# Patient Record
Sex: FEMALE | Race: WHITE | Employment: OTHER | URBAN - METROPOLITAN AREA
[De-identification: names, ages, dates, MRNs, and addresses within clinical notes are randomized per-mention and may not be internally consistent; named-entity substitution may affect disease eponyms.]

---

## 2017-02-09 RX ORDER — LOSARTAN POTASSIUM 50 MG/1
TABLET ORAL
Qty: 90 TABLET | Refills: 0 | OUTPATIENT
Start: 2017-02-09

## 2017-02-09 NOTE — TELEPHONE ENCOUNTER
No future appointments.  Future Appointments  Date Time Provider Darren Ana   2/22/2017 10:30 AM Trudy Brower MD [facility] [address]         LOV 8/16    LAST LAB 8/16    LAST RX  MetFORMIN HCl 850 MG Oral Tab 90 tablet 1 8/8/2016     Losartan Pota

## 2017-02-17 ENCOUNTER — APPOINTMENT (OUTPATIENT)
Dept: LAB | Age: 72
End: 2017-02-17
Attending: FAMILY MEDICINE
Payer: MEDICARE

## 2017-02-17 DIAGNOSIS — E11.9 CONTROLLED TYPE 2 DIABETES MELLITUS WITHOUT COMPLICATION, WITHOUT LONG-TERM CURRENT USE OF INSULIN (HCC): ICD-10-CM

## 2017-02-17 DIAGNOSIS — I10 ESSENTIAL HYPERTENSION WITH GOAL BLOOD PRESSURE LESS THAN 140/90: ICD-10-CM

## 2017-02-17 DIAGNOSIS — E78.2 MIXED HYPERLIPIDEMIA: ICD-10-CM

## 2017-02-17 LAB
ALBUMIN SERPL-MCNC: 4.1 G/DL (ref 3.5–4.8)
ALP LIVER SERPL-CCNC: 59 U/L (ref 55–142)
ALT SERPL-CCNC: 58 U/L (ref 14–54)
AST SERPL-CCNC: 38 U/L (ref 15–41)
BILIRUB SERPL-MCNC: 1.2 MG/DL (ref 0.1–2)
BUN BLD-MCNC: 23 MG/DL (ref 8–20)
CALCIUM BLD-MCNC: 9.7 MG/DL (ref 8.3–10.3)
CHLORIDE: 103 MMOL/L (ref 101–111)
CHOLEST SMN-MCNC: 180 MG/DL (ref ?–200)
CO2: 27 MMOL/L (ref 22–32)
CREAT BLD-MCNC: 0.95 MG/DL (ref 0.55–1.02)
CREAT UR-SCNC: 146 MG/DL
EST. AVERAGE GLUCOSE BLD GHB EST-MCNC: 140 MG/DL (ref 68–126)
GLUCOSE BLD-MCNC: 127 MG/DL (ref 70–99)
HBA1C MFR BLD HPLC: 6.5 % (ref ?–5.7)
HDLC SERPL-MCNC: 73 MG/DL (ref 45–?)
HDLC SERPL: 2.47 {RATIO} (ref ?–4.44)
LDLC SERPL CALC-MCNC: 77 MG/DL (ref ?–130)
M PROTEIN MFR SERPL ELPH: 8.1 G/DL (ref 6.1–8.3)
MICROALBUMIN UR-MCNC: 1.22 MG/DL
MICROALBUMIN/CREAT 24H UR-RTO: 8.4 UG/MG (ref ?–30)
NONHDLC SERPL-MCNC: 107 MG/DL (ref ?–130)
POTASSIUM SERPL-SCNC: 4.9 MMOL/L (ref 3.6–5.1)
SODIUM SERPL-SCNC: 137 MMOL/L (ref 136–144)
TRIGLYCERIDES: 148 MG/DL (ref ?–150)
VLDL: 30 MG/DL (ref 5–40)

## 2017-02-17 PROCEDURE — 82043 UR ALBUMIN QUANTITATIVE: CPT

## 2017-02-17 PROCEDURE — 83036 HEMOGLOBIN GLYCOSYLATED A1C: CPT

## 2017-02-17 PROCEDURE — 36415 COLL VENOUS BLD VENIPUNCTURE: CPT

## 2017-02-17 PROCEDURE — 80061 LIPID PANEL: CPT

## 2017-02-17 PROCEDURE — 82570 ASSAY OF URINE CREATININE: CPT

## 2017-02-17 PROCEDURE — 80053 COMPREHEN METABOLIC PANEL: CPT

## 2017-02-22 ENCOUNTER — OFFICE VISIT (OUTPATIENT)
Dept: FAMILY MEDICINE CLINIC | Facility: CLINIC | Age: 72
End: 2017-02-22

## 2017-02-22 VITALS
SYSTOLIC BLOOD PRESSURE: 122 MMHG | HEART RATE: 75 BPM | TEMPERATURE: 98 F | DIASTOLIC BLOOD PRESSURE: 70 MMHG | RESPIRATION RATE: 16 BRPM | OXYGEN SATURATION: 98 % | HEIGHT: 62 IN | BODY MASS INDEX: 31.06 KG/M2 | WEIGHT: 168.81 LBS

## 2017-02-22 DIAGNOSIS — H81.11 BENIGN POSITIONAL VERTIGO, RIGHT: ICD-10-CM

## 2017-02-22 DIAGNOSIS — R79.89 LFT ELEVATION: ICD-10-CM

## 2017-02-22 DIAGNOSIS — E11.9 CONTROLLED TYPE 2 DIABETES MELLITUS WITHOUT COMPLICATION, WITHOUT LONG-TERM CURRENT USE OF INSULIN (HCC): Primary | ICD-10-CM

## 2017-02-22 DIAGNOSIS — I10 ESSENTIAL HYPERTENSION WITH GOAL BLOOD PRESSURE LESS THAN 140/90: ICD-10-CM

## 2017-02-22 DIAGNOSIS — Z79.84 LONG TERM CURRENT USE OF ORAL HYPOGLYCEMIC DRUG: ICD-10-CM

## 2017-02-22 PROCEDURE — 99214 OFFICE O/P EST MOD 30 MIN: CPT | Performed by: FAMILY MEDICINE

## 2017-02-22 RX ORDER — MECLIZINE HCL 12.5 MG/1
12.5 TABLET ORAL 3 TIMES DAILY PRN
Qty: 30 TABLET | Refills: 0 | Status: SHIPPED | OUTPATIENT
Start: 2017-02-22 | End: 2018-01-26

## 2017-02-22 RX ORDER — LANCETS
1 EACH MISCELLANEOUS DAILY
Qty: 100 EACH | Refills: 1 | Status: SHIPPED | OUTPATIENT
Start: 2017-02-22 | End: 2017-08-11

## 2017-02-22 RX ORDER — LOSARTAN POTASSIUM 50 MG/1
50 TABLET ORAL DAILY
Qty: 90 TABLET | Refills: 1 | Status: SHIPPED | OUTPATIENT
Start: 2017-02-22 | End: 2017-10-04

## 2017-02-22 NOTE — PROGRESS NOTES
Lara Steinberg IS A 73year old female [address] [facility] Patient presents with:  Lab Results: go over blood work from 2/17/17  Medication Request: metformin,losartan potassium, glucose strips and lancets  Dizziness: for last 2 weeks dizzness and upset stomach, pt strip Rfl: 1   Lancets (UNILET COMFORTOUCH LANCET) Does not apply Misc 1 each by In Vitro route daily. Disp: 100 each Rfl: 1   Losartan Potassium 50 MG Oral Tab Take 1 tablet (50 mg total) by mouth daily.  Disp: 90 tablet Rfl: 1   MetFORMIN HCl 850 MG Oral in high school for 1 year    Alcohol Use: Yes  0.0 oz/week    0 Standard drinks or equivalent per week         Comment: occ glass of wine 1-2 per month    Drug Use: No    Sexual Activity: Yes     Other Topics Concern    Caffeine Concern No    Exercise Yes 77(mg/dL)  Date: 02/17/2017   VLDL Value: 30(mg/dL)  Date: 02/17/2017   Chol/HDL Ratio Value: 2.47  Date: 02/17/2017   Non HDL Chol Value: 107(mg/dL)  Date: 02/17/2017   HgbA1C Value: 6.5(%)* Date: 02/17/2017   Estimated Average Glucose Value: 140(mg/dL)*

## 2017-02-22 NOTE — PATIENT INSTRUCTIONS
Schedule annual medicare advantage exam in near future. Do prevnar at that visit. You're up to date and well controlled with diabetic care. Congrats! Recheck on diabetes in 6 months.

## 2017-02-23 ENCOUNTER — TELEPHONE (OUTPATIENT)
Dept: FAMILY MEDICINE CLINIC | Facility: CLINIC | Age: 72
End: 2017-02-23

## 2017-02-23 DIAGNOSIS — E11.9 CONTROLLED TYPE 2 DIABETES MELLITUS WITHOUT COMPLICATION, WITHOUT LONG-TERM CURRENT USE OF INSULIN (HCC): Primary | ICD-10-CM

## 2017-03-30 ENCOUNTER — LAB ENCOUNTER (OUTPATIENT)
Dept: LAB | Age: 72
End: 2017-03-30
Attending: FAMILY MEDICINE
Payer: MEDICARE

## 2017-03-30 DIAGNOSIS — R79.89 LFT ELEVATION: ICD-10-CM

## 2017-03-30 PROCEDURE — 36415 COLL VENOUS BLD VENIPUNCTURE: CPT

## 2017-03-30 PROCEDURE — 80076 HEPATIC FUNCTION PANEL: CPT

## 2017-04-05 ENCOUNTER — OFFICE VISIT (OUTPATIENT)
Dept: FAMILY MEDICINE CLINIC | Facility: CLINIC | Age: 72
End: 2017-04-05

## 2017-04-05 VITALS
DIASTOLIC BLOOD PRESSURE: 64 MMHG | BODY MASS INDEX: 31.58 KG/M2 | HEART RATE: 84 BPM | RESPIRATION RATE: 16 BRPM | TEMPERATURE: 98 F | OXYGEN SATURATION: 96 % | HEIGHT: 62 IN | SYSTOLIC BLOOD PRESSURE: 120 MMHG | WEIGHT: 171.63 LBS

## 2017-04-05 DIAGNOSIS — E78.2 MIXED HYPERLIPIDEMIA: ICD-10-CM

## 2017-04-05 DIAGNOSIS — E11.9 TYPE 2 DIABETES MELLITUS WITHOUT COMPLICATION, WITHOUT LONG-TERM CURRENT USE OF INSULIN (HCC): ICD-10-CM

## 2017-04-05 DIAGNOSIS — Z12.31 ENCOUNTER FOR SCREENING MAMMOGRAM FOR BREAST CANCER: ICD-10-CM

## 2017-04-05 DIAGNOSIS — Z12.11 SCREENING FOR COLON CANCER: ICD-10-CM

## 2017-04-05 DIAGNOSIS — I10 HYPERTENSION, ESSENTIAL: ICD-10-CM

## 2017-04-05 DIAGNOSIS — Z78.0 POSTMENOPAUSAL: ICD-10-CM

## 2017-04-05 DIAGNOSIS — R79.89 LFT ELEVATION: ICD-10-CM

## 2017-04-05 DIAGNOSIS — E11.36 DIABETIC CATARACT(366.41): ICD-10-CM

## 2017-04-05 DIAGNOSIS — N63.10 BREAST MASS, RIGHT: ICD-10-CM

## 2017-04-05 DIAGNOSIS — Z23 NEED FOR VACCINATION: ICD-10-CM

## 2017-04-05 DIAGNOSIS — Z12.31 VISIT FOR SCREENING MAMMOGRAM: ICD-10-CM

## 2017-04-05 DIAGNOSIS — K76.0 FATTY LIVER: ICD-10-CM

## 2017-04-05 DIAGNOSIS — Z00.00 ENCOUNTER FOR ANNUAL HEALTH EXAMINATION: Primary | ICD-10-CM

## 2017-04-05 PROBLEM — E66.9 OBESITY (BMI 30.0-34.9): Status: ACTIVE | Noted: 2017-04-05

## 2017-04-05 PROBLEM — E66.811 OBESITY (BMI 30.0-34.9): Status: ACTIVE | Noted: 2017-04-05

## 2017-04-05 PROBLEM — H26.9 CATARACT OF BOTH EYES: Status: ACTIVE | Noted: 2017-04-05

## 2017-04-05 PROBLEM — R42 VERTIGO: Status: ACTIVE | Noted: 2017-04-05

## 2017-04-05 PROCEDURE — 90670 PCV13 VACCINE IM: CPT | Performed by: FAMILY MEDICINE

## 2017-04-05 PROCEDURE — 96160 PT-FOCUSED HLTH RISK ASSMT: CPT | Performed by: FAMILY MEDICINE

## 2017-04-05 PROCEDURE — G0438 PPPS, INITIAL VISIT: HCPCS | Performed by: FAMILY MEDICINE

## 2017-04-05 PROCEDURE — G0009 ADMIN PNEUMOCOCCAL VACCINE: HCPCS | Performed by: FAMILY MEDICINE

## 2017-04-05 RX ORDER — PRAVASTATIN SODIUM 40 MG
40 TABLET ORAL NIGHTLY
Qty: 90 TABLET | Refills: 1 | Status: SHIPPED | OUTPATIENT
Start: 2017-04-05 | End: 2017-10-04

## 2017-04-05 NOTE — PATIENT INSTRUCTIONS
Refilled pravastatin. Recheck liver enzymes in 3 months. Discussed weight loss. 8043-2689 calories a day recommended, portion control. Recheck diabetes in 6 months. Plan last pap in 6 months.  (not usually recommended past age 79, but a final betw (mg/dL)   Date Value   03/25/2014 137     TRIGLYCERIDES (mg/dL)   Date Value   02/17/2017 148   03/25/2014 95    Recheck 6 months    EKG - covered if needed at [facility], and non-screening if indicated for medical reasons Electrocardiogram date03 for 2 year anniversary or as ordered in After Visit Summary    Ordered   Pap and Pelvic      Pap: Every 3 yrs age 21-68 or Pap+HPV every 5 yrs age 33-67, Covered every 2 yrs up to age 79 or Yearly if High Risk   There are no preventive care reminders to [facility] Directives    [employer]no. org/publications/Documents/personal_dec. pdf  An information packet, including necessary form from the [employer] website. http://www. [place]ph[place]state. [place]/public/books/advin.htm  A link to the [place] with gritty weather proof paint. ? Pay attention to curbs and other changes in surfaces when out in the community. ? Take care when walking on gravel or grassy surfaces. ? Avoid walking on snowy or icy surfaces. ?  Use a cane or walker (indoors and out)

## 2017-04-05 NOTE — PROGRESS NOTES
HPI:   Jovanni Bourne is a 73year old female who presents for a MA (Medicare Advantage) Supervisit (Once per calendar year). Here for Supervisit.   Her last annual assessment has been over 1 year: Annual Physical due on 11/09/1947         Patient Car 1000 MG Oral Cap Take  by mouth daily. aspirin 81 MG Oral Tab Take 81 mg by mouth daily. Glucosamine-Chondroitin (GLUCOSAMINE CHONDR COMPLEX OR) Take  by mouth daily.       MEDICAL INFORMATION:   She  has a past medical history of Unspecified essential headaches  PSYCHE: denies depression or anxiety  HEMATOLOGIC: denies hx of anemia  ENDOCRINE: denies thyroid history, has DM, see prior visits.   ALL/ASTHMA: has hx of allergy to medicines, none environmental, no asthma    Tripped and fell 4 weeks ago, see normal, no rashes or lesions   Lymph nodes: Cervical, supraclavicular, and axillary nodes normal   Neurologic: Normal    and Breasts:  normal appearance, left normal without mass, nipple discharge or axillary adenopathy.  Right with fibrous band at 9:00 fro suspicious. Comments:  right behind and lateral to areola  Orders:  -     [facility] (CPT=77066); Future    Encounter for screening mammogram for breast cancer  -     [facility] DI[facility]NOST[facility] BILATERAL (CPT=77066);  Future    Cataract of both eyes, uns help    Preparing your meals: Able without help    Managing money/bills: Able without help    Taking medications as prescribed: Able without help    Are you able to afford your medications?: Yes    Hearing Problems?: No     Functional Status     Hearing Pr patient  PREVENTATIVE SERVICES  INDICATIONS AND SCHEDULE Internal Lab or Procedure External Lab or Procedure   Diabetes Screening      HbgA1C   Annually   Lab Results  Component Value Date   A1C 6.8* 03/25/2014   HGBA1C 6.5* 02/17/2017    No flowsheet data No vaccine history found Please get once after your 65th birthday    Pneumococcal 23 (Pneumovax)  Covered Once after 65 5/7/14 Please get once after your 65th birthday    Hepatitis B for Moderate/High Risk No vaccine history found Medium/high risk factors: Annually Spirometry date:  No flowsheet data found.          Template: SHRUTHI WASSERMAN MEDICARE ANNUAL ASSESSMENT FEMALE [81580]

## 2017-04-13 ENCOUNTER — HOSPITAL ENCOUNTER (OUTPATIENT)
Dept: BONE DENSITY | Age: 72
Discharge: HOME | End: 2017-04-13
Attending: FAMILY MEDICINE
Payer: MEDICARE

## 2017-04-13 ENCOUNTER — HOSPITAL ENCOUNTER (OUTPATIENT)
Dept: ULTRASOUND IMAGING | Age: 72
Discharge: HOME | End: 2017-04-13
Attending: FAMILY MEDICINE
Payer: MEDICARE

## 2017-04-13 ENCOUNTER — HOSPITAL ENCOUNTER (OUTPATIENT)
Dept: MAMMOGRAPHY | Age: 72
Discharge: HOME | End: 2017-04-13
Attending: FAMILY MEDICINE
Payer: MEDICARE

## 2017-04-13 ENCOUNTER — APPOINTMENT (OUTPATIENT)
Dept: ULTRASOUND IMAGING | Facility: HOSPITAL | Age: 72
End: 2017-04-13
Attending: FAMILY MEDICINE
Payer: MEDICARE

## 2017-04-13 ENCOUNTER — TELEPHONE (OUTPATIENT)
Dept: FAMILY MEDICINE CLINIC | Facility: CLINIC | Age: 72
End: 2017-04-13

## 2017-04-13 DIAGNOSIS — N63.10 BREAST MASS, RIGHT: ICD-10-CM

## 2017-04-13 DIAGNOSIS — Z78.0 POSTMENOPAUSAL: ICD-10-CM

## 2017-04-13 DIAGNOSIS — Z12.31 ENCOUNTER FOR SCREENING MAMMOGRAM FOR BREAST CANCER: ICD-10-CM

## 2017-04-13 DIAGNOSIS — Z12.31 VISIT FOR SCREENING MAMMOGRAM: ICD-10-CM

## 2017-04-13 PROCEDURE — 77066 DX MAMMO INCL CAD BI: CPT

## 2017-04-13 PROCEDURE — 77062 BREAST TOMOSYNTHESIS BI: CPT

## 2017-04-13 PROCEDURE — 76642 ULTRASOUND BREAST LIMITED: CPT

## 2017-04-13 PROCEDURE — 77080 DXA BONE DENSITY AXIAL: CPT

## 2017-04-13 NOTE — TELEPHONE ENCOUNTER
Per Dr Darnell Beltran patient needs R breast biopsy/ suspicious for cancer patient has palpable mass.

## 2017-04-13 NOTE — IMAGING NOTE
Assisted Dr. Marysol Billy with Recommendation for an Ultrasound guided breast biopsy. Procedure explained and written information given to Wu Oro. Emotional support provided and all questions answered.   Our breast center schedulers will call pt within

## 2017-04-14 DIAGNOSIS — N63.10 MASS OF RIGHT BREAST: Primary | ICD-10-CM

## 2017-04-18 ENCOUNTER — TELEPHONE (OUTPATIENT)
Dept: FAMILY MEDICINE CLINIC | Facility: CLINIC | Age: 72
End: 2017-04-18

## 2017-04-18 NOTE — TELEPHONE ENCOUNTER
Nehal Ackerman called and left a message stating she is having dental work on April 26th and needs an antibiotic called in due to knee replacement.

## 2017-04-19 NOTE — TELEPHONE ENCOUNTER
Actually, on review of antibiotics for dental procedures in patients with joint replacement, this is the current evidence:     In 2013, the ADA and AAOS published a joint guideline on the prevention of orthopedic implant infections in patients undergoing de

## 2017-04-20 ENCOUNTER — HOSPITAL ENCOUNTER (OUTPATIENT)
Dept: MAMMOGRAPHY | Facility: HOSPITAL | Age: 72
Discharge: HOME | End: 2017-04-20
Attending: FAMILY MEDICINE
Payer: MEDICARE

## 2017-04-20 DIAGNOSIS — N63.10 MASS OF RIGHT BREAST: ICD-10-CM

## 2017-04-20 PROCEDURE — 19083 BX BREAST 1ST LESION US IMAG: CPT

## 2017-04-20 PROCEDURE — 88305 TISSUE EXAM BY PATHOLOGIST: CPT | Performed by: FAMILY MEDICINE

## 2017-04-20 PROCEDURE — 88377 M/PHMTRC ALYS ISHQUANT/SEMIQ: CPT | Performed by: FAMILY MEDICINE

## 2017-04-20 PROCEDURE — 88360 TUMOR IMMUNOHISTOCHEM/MANUAL: CPT | Performed by: FAMILY MEDICINE

## 2017-04-20 PROCEDURE — 77065 DX MAMMO INCL CAD UNI: CPT

## 2017-04-24 NOTE — IMAGING NOTE
Pt has an apt with Radiologist and Breast Care Coordinator, RN tomorrow (4/25 @ 1:30) to discuss positive breast biopsy results.  I will then facilitate a surgical consult with Dr. King Rivas per PCP referral.

## 2017-04-25 ENCOUNTER — TELEPHONE (OUTPATIENT)
Dept: FAMILY MEDICINE CLINIC | Facility: CLINIC | Age: 72
End: 2017-04-25

## 2017-04-25 ENCOUNTER — NURSE ONLY (OUTPATIENT)
Dept: MAMMOGRAPHY | Facility: HOSPITAL | Age: 72
End: 2017-04-25

## 2017-04-25 NOTE — TELEPHONE ENCOUNTER
Patient has invasive breast cancer and cant see Dr Jose Og for 2 weeks. Do you want her to see another dr or ok to wait? Call Whitney Arvizu [phone] to inform.

## 2017-04-25 NOTE — IMAGING NOTE
Pt and  arrived for Breast Biopsy Result Clinic with Radiologist, Dr. Barbara Coleman, and myself, Breast Care Coordinator RN. New breast cancer diagnosis discussed. Invasive Ductal Carcinoma, ER+/AK+ with a low Ki. Emotional support provided.   \"ve

## 2017-04-25 NOTE — TELEPHONE ENCOUNTER
Prefer for her to see someone with [facility]. Dr. Trino Gonsales or Harry Sofia or [place].

## 2017-04-26 ENCOUNTER — TELEPHONE (OUTPATIENT)
Dept: FAMILY MEDICINE CLINIC | Facility: CLINIC | Age: 72
End: 2017-04-26

## 2017-04-26 DIAGNOSIS — N63.10 BREAST MASS, RIGHT: Primary | ICD-10-CM

## 2017-04-26 DIAGNOSIS — C50.911 MALIGNANT NEOPLASM OF RIGHT FEMALE BREAST, UNSPECIFIED SITE OF BREAST: ICD-10-CM

## 2017-04-26 NOTE — TELEPHONE ENCOUNTER
Dr Zane Dickens  referral.placed.     Future Appointments  Date Time Provider Darren Perez   5/1/2017 8:30 AM Negrito Lopez MD [facility]   5/2/2017 11:00 AM Dee Orozco MD [facility] [address]   5/9/2017 8:00 AM Calvin Henderson MD

## 2017-04-26 NOTE — TELEPHONE ENCOUNTER
Patient states she was diagnosed with breast cancer yesterday. She would like a second opinion. She needs a referral with her insurance. She would like to see Dr Yury Blanco with [facility].

## 2017-05-01 ENCOUNTER — OFFICE VISIT (OUTPATIENT)
Dept: SURGERY | Facility: CLINIC | Age: 72
End: 2017-05-01

## 2017-05-01 VITALS
RESPIRATION RATE: 16 BRPM | BODY MASS INDEX: 31.47 KG/M2 | WEIGHT: 171 LBS | SYSTOLIC BLOOD PRESSURE: 118 MMHG | HEART RATE: 86 BPM | HEIGHT: 62 IN | DIASTOLIC BLOOD PRESSURE: 76 MMHG

## 2017-05-01 DIAGNOSIS — C50.411 MALIGNANT NEOPLASM OF UPPER-OUTER QUADRANT OF RIGHT FEMALE BREAST (HCC): Primary | ICD-10-CM

## 2017-05-01 PROCEDURE — 99204 OFFICE O/P NEW MOD 45 MIN: CPT | Performed by: SURGERY

## 2017-05-01 RX ORDER — CHLORHEXIDINE GLUCONATE 0.12 MG/ML
RINSE ORAL
Refills: 0 | COMMUNITY
Start: 2017-04-27 | End: 2017-06-14

## 2017-05-01 NOTE — H&P
New Patient  Jenny Hawkins  11/9/1945 5/1/2017    This patient was referred by Ugo Tracey MD for evaluation and consultation for right breast cancer.     Chief Complaint:   Right breast cancer  Breast Pain: Some discomfort in the area of the recen x2   • Migraine, unspecified, without mention of intractable migraine without mention of status migrainosus    • Bell's palsy      on right   • Cholelithiases    • Other and unspecified hyperlipidemia    • High blood pressure    • High cholesterol    • Typ History Narrative    3 daughters, Roberto Carlos Murray 46, Fabiola 42, Corrie 40. , retired, has a cat.          Current outpatient prescriptions:   •  Chlorhexidine Gluconate 0.12 % Mouth/Throat Solution, USE UTD, Disp: , Rfl: 0  •  Pravastatin Sodium 40 MG Oral Tab, light and accommodate. Lungs: Clear to auscultation bilaterally. Breast: The patient is examined in the sitting and supine position. The breast parenchyma is moderately nodular.   Right Breast -ecchymosis in the lateral aspect of the breast status post the patient's satisfaction. No other questions were presented at this time. Assessment   Assessment:          The patient is a 80-year-old female with no family history of breast or ovarian cancer.   In fact there is no known cancer history in the fam She would likely prefer this option to mastectomy. She wishes to discuss surgery with her  and daughter prior to finalizing her decision. She will contact this office once she has made her decision.     Thank you for allowing me to participate in y

## 2017-05-03 ENCOUNTER — TELEPHONE (OUTPATIENT)
Dept: FAMILY MEDICINE CLINIC | Facility: CLINIC | Age: 72
End: 2017-05-03

## 2017-05-03 DIAGNOSIS — N63.10 BREAST MASS, RIGHT: Primary | ICD-10-CM

## 2017-05-03 NOTE — TELEPHONE ENCOUNTER
Pt had appt with Dr Lyric Bryant on 5/1/17  Now asking for second opinion with Dr Radhames Davis    Additional order placed with Dr Avalos Fitting  Plan:     Javi Morales is a candidate for breast conservation with lumpectomy with localization and sentinel lymph node biopsy.  S

## 2017-05-05 ENCOUNTER — TELEPHONE (OUTPATIENT)
Dept: SURGERY | Facility: CLINIC | Age: 72
End: 2017-05-05

## 2017-05-05 NOTE — TELEPHONE ENCOUNTER
Called pt to follow up. Tammie Nova does have some concern re the radioisotope which is used for sentinel LN mapping. This portion of procedure can be done with methylene blue only. All questions were answered.    Tammie Nova states she is reading books and gettin

## 2017-05-11 ENCOUNTER — TELEPHONE (OUTPATIENT)
Dept: FAMILY MEDICINE CLINIC | Facility: CLINIC | Age: 72
End: 2017-05-11

## 2017-05-11 RX ORDER — ACETAMINOPHEN 160 MG
2000 TABLET,DISINTEGRATING ORAL DAILY
COMMUNITY

## 2017-05-11 NOTE — TELEPHONE ENCOUNTER
Patient states she's having surgery on May 22nd and she wants to know when she needs to stop taking the Asprin?

## 2017-05-12 NOTE — TELEPHONE ENCOUNTER
Patient is to hold ASA for a week before surgery. Labs ordered and EKG already done. Spoke to patient and gave information.

## 2017-05-17 ENCOUNTER — APPOINTMENT (OUTPATIENT)
Dept: LAB | Age: 72
End: 2017-05-17
Payer: MEDICARE

## 2017-05-17 ENCOUNTER — LAB ENCOUNTER (OUTPATIENT)
Dept: LAB | Age: 72
End: 2017-05-17
Payer: MEDICARE

## 2017-05-17 DIAGNOSIS — Z85.3 HISTORY OF RIGHT BREAST CANCER: ICD-10-CM

## 2017-05-17 PROCEDURE — 93010 ELECTROCARDIOGRAM REPORT: CPT | Performed by: INTERNAL MEDICINE

## 2017-05-17 PROCEDURE — 36415 COLL VENOUS BLD VENIPUNCTURE: CPT

## 2017-05-17 PROCEDURE — 80048 BASIC METABOLIC PNL TOTAL CA: CPT

## 2017-05-17 PROCEDURE — 93005 ELECTROCARDIOGRAM TRACING: CPT

## 2017-05-18 ENCOUNTER — TELEPHONE (OUTPATIENT)
Dept: MAMMOGRAPHY | Age: 72
End: 2017-05-18

## 2017-05-18 NOTE — TELEPHONE ENCOUNTER
Spoke with pt and provided pre-procedure instructions for SLN mapping and needle LOC scheduled for Monday.  I explained that pt will travel through the lobby to the [facility] to have LOC preformed by the Radiologist. Pt verbalized understanding, answered question

## 2017-05-21 ENCOUNTER — ANESTHESIA EVENT (OUTPATIENT)
Dept: SURGERY | Facility: HOSPITAL | Age: 72
End: 2017-05-21
Payer: MEDICARE

## 2017-05-22 ENCOUNTER — SURGERY (OUTPATIENT)
Age: 72
End: 2017-05-22

## 2017-05-22 ENCOUNTER — HOSPITAL ENCOUNTER (OUTPATIENT)
Dept: MAMMOGRAPHY | Facility: HOSPITAL | Age: 72
Discharge: HOME | End: 2017-05-22
Attending: SURGERY
Payer: MEDICARE

## 2017-05-22 ENCOUNTER — HOSPITAL ENCOUNTER (OUTPATIENT)
Facility: HOSPITAL | Age: 72
Setting detail: HOSPITAL OUTPATIENT SURGERY
Discharge: HOME | End: 2017-05-22
Attending: SURGERY | Admitting: SURGERY
Payer: MEDICARE

## 2017-05-22 ENCOUNTER — HOSPITAL ENCOUNTER (OUTPATIENT)
Dept: NUCLEAR MEDICINE | Facility: HOSPITAL | Age: 72
Discharge: HOME | End: 2017-05-22
Attending: SURGERY
Payer: MEDICARE

## 2017-05-22 ENCOUNTER — APPOINTMENT (OUTPATIENT)
Dept: MAMMOGRAPHY | Facility: HOSPITAL | Age: 72
End: 2017-05-22
Attending: SURGERY
Payer: MEDICARE

## 2017-05-22 ENCOUNTER — ANESTHESIA (OUTPATIENT)
Dept: SURGERY | Facility: HOSPITAL | Age: 72
End: 2017-05-22
Payer: MEDICARE

## 2017-05-22 ENCOUNTER — HOSPITAL ENCOUNTER (OUTPATIENT)
Dept: NUCLEAR MEDICINE | Facility: HOSPITAL | Age: 72
End: 2017-05-22
Attending: SURGERY
Payer: MEDICARE

## 2017-05-22 VITALS
WEIGHT: 163 LBS | TEMPERATURE: 99 F | OXYGEN SATURATION: 94 % | HEART RATE: 90 BPM | BODY MASS INDEX: 30 KG/M2 | SYSTOLIC BLOOD PRESSURE: 119 MMHG | HEIGHT: 62 IN | RESPIRATION RATE: 16 BRPM | DIASTOLIC BLOOD PRESSURE: 58 MMHG

## 2017-05-22 DIAGNOSIS — C50.912 MALIGNANT NEOPLASM OF LEFT FEMALE BREAST, UNSPECIFIED ESTROGEN RECEPTOR STATUS, UNSPECIFIED SITE OF BREAST (HCC): ICD-10-CM

## 2017-05-22 DIAGNOSIS — Z85.3 HISTORY OF RIGHT BREAST CANCER: Primary | ICD-10-CM

## 2017-05-22 PROCEDURE — 88377 M/PHMTRC ALYS ISHQUANT/SEMIQ: CPT | Performed by: SURGERY

## 2017-05-22 PROCEDURE — 82962 GLUCOSE BLOOD TEST: CPT

## 2017-05-22 PROCEDURE — 88307 TISSUE EXAM BY PATHOLOGIST: CPT | Performed by: SURGERY

## 2017-05-22 PROCEDURE — 07B50ZX EXCISION OF RIGHT AXILLARY LYMPHATIC, OPEN APPROACH, DIAGNOSTIC: ICD-10-PCS | Performed by: SURGERY

## 2017-05-22 PROCEDURE — 0HBT0ZZ EXCISION OF RIGHT BREAST, OPEN APPROACH: ICD-10-PCS | Performed by: SURGERY

## 2017-05-22 PROCEDURE — 78195 LYMPH SYSTEM IMAGING: CPT | Performed by: SURGERY

## 2017-05-22 PROCEDURE — BH40ZZZ ULTRASONOGRAPHY OF RIGHT BREAST: ICD-10-PCS | Performed by: SURGERY

## 2017-05-22 PROCEDURE — BH00ZZZ PLAIN RADIOGRAPHY OF RIGHT BREAST: ICD-10-PCS | Performed by: SURGERY

## 2017-05-22 PROCEDURE — 77065 DX MAMMO INCL CAD UNI: CPT | Performed by: SURGERY

## 2017-05-22 PROCEDURE — 19285 PERQ DEV BREAST 1ST US IMAG: CPT | Performed by: SURGERY

## 2017-05-22 RX ORDER — LIDOCAINE AND PRILOCAINE 25; 25 MG/G; MG/G
CREAM TOPICAL
Status: DISCONTINUED
Start: 2017-05-22 | End: 2017-05-22

## 2017-05-22 RX ORDER — METOCLOPRAMIDE HYDROCHLORIDE 5 MG/ML
10 INJECTION INTRAMUSCULAR; INTRAVENOUS AS NEEDED
Status: DISCONTINUED | OUTPATIENT
Start: 2017-05-22 | End: 2017-05-22

## 2017-05-22 RX ORDER — BACITRACIN 50000 [USP'U]/1
INJECTION, POWDER, LYOPHILIZED, FOR SOLUTION INTRAMUSCULAR AS NEEDED
Status: DISCONTINUED | OUTPATIENT
Start: 2017-05-22 | End: 2017-05-22

## 2017-05-22 RX ORDER — SODIUM CHLORIDE, SODIUM LACTATE, POTASSIUM CHLORIDE, CALCIUM CHLORIDE 600; 310; 30; 20 MG/100ML; MG/100ML; MG/100ML; MG/100ML
INJECTION, SOLUTION INTRAVENOUS CONTINUOUS
Status: DISCONTINUED | OUTPATIENT
Start: 2017-05-22 | End: 2017-05-22

## 2017-05-22 RX ORDER — CLINDAMYCIN PHOSPHATE 900 MG/50ML
900 INJECTION INTRAVENOUS ONCE
Status: COMPLETED | OUTPATIENT
Start: 2017-05-22 | End: 2017-05-22

## 2017-05-22 RX ORDER — ONDANSETRON 2 MG/ML
4 INJECTION INTRAMUSCULAR; INTRAVENOUS AS NEEDED
Status: DISCONTINUED | OUTPATIENT
Start: 2017-05-22 | End: 2017-05-22

## 2017-05-22 RX ORDER — INSULIN ASPART 100 [IU]/ML
INJECTION, SOLUTION INTRAVENOUS; SUBCUTANEOUS ONCE
Status: DISCONTINUED | OUTPATIENT
Start: 2017-05-22 | End: 2017-05-22

## 2017-05-22 RX ORDER — LIDOCAINE HYDROCHLORIDE AND EPINEPHRINE 10; 10 MG/ML; UG/ML
INJECTION, SOLUTION INFILTRATION; PERINEURAL AS NEEDED
Status: DISCONTINUED | OUTPATIENT
Start: 2017-05-22 | End: 2017-05-22

## 2017-05-22 RX ORDER — DEXTROSE MONOHYDRATE 25 G/50ML
50 INJECTION, SOLUTION INTRAVENOUS
Status: DISCONTINUED | OUTPATIENT
Start: 2017-05-22 | End: 2017-05-22 | Stop reason: HOSPADM

## 2017-05-22 RX ORDER — DIAZEPAM 5 MG/1
TABLET ORAL
Status: DISCONTINUED
Start: 2017-05-22 | End: 2017-05-22

## 2017-05-22 RX ORDER — MEPERIDINE HYDROCHLORIDE 25 MG/ML
12.5 INJECTION INTRAMUSCULAR; INTRAVENOUS; SUBCUTANEOUS AS NEEDED
Status: DISCONTINUED | OUTPATIENT
Start: 2017-05-22 | End: 2017-05-22

## 2017-05-22 RX ORDER — TRAMADOL HYDROCHLORIDE 50 MG/1
100 TABLET ORAL EVERY 6 HOURS PRN
Status: DISCONTINUED | OUTPATIENT
Start: 2017-05-22 | End: 2017-05-22

## 2017-05-22 RX ORDER — TRAMADOL HYDROCHLORIDE 50 MG/1
50 TABLET ORAL EVERY 6 HOURS PRN
Qty: 30 TABLET | Refills: 1 | Status: SHIPPED | OUTPATIENT
Start: 2017-05-22 | End: 2017-07-05

## 2017-05-22 RX ORDER — NALOXONE HYDROCHLORIDE 0.4 MG/ML
80 INJECTION, SOLUTION INTRAMUSCULAR; INTRAVENOUS; SUBCUTANEOUS AS NEEDED
Status: DISCONTINUED | OUTPATIENT
Start: 2017-05-22 | End: 2017-05-22

## 2017-05-22 RX ORDER — MIDAZOLAM HYDROCHLORIDE 1 MG/ML
1 INJECTION INTRAMUSCULAR; INTRAVENOUS EVERY 5 MIN PRN
Status: DISCONTINUED | OUTPATIENT
Start: 2017-05-22 | End: 2017-05-22

## 2017-05-22 RX ORDER — MEPERIDINE HYDROCHLORIDE 25 MG/ML
INJECTION INTRAMUSCULAR; INTRAVENOUS; SUBCUTANEOUS
Status: COMPLETED
Start: 2017-05-22 | End: 2017-05-22

## 2017-05-22 RX ORDER — ACETAMINOPHEN 500 MG
1000 TABLET ORAL ONCE AS NEEDED
Status: DISCONTINUED | OUTPATIENT
Start: 2017-05-22 | End: 2017-05-22

## 2017-05-22 RX ORDER — CLINDAMYCIN PHOSPHATE 900 MG/50ML
INJECTION INTRAVENOUS
Status: DISCONTINUED
Start: 2017-05-22 | End: 2017-05-22

## 2017-05-22 RX ORDER — LIDOCAINE AND PRILOCAINE 25; 25 MG/G; MG/G
CREAM TOPICAL ONCE
Status: COMPLETED | OUTPATIENT
Start: 2017-05-22 | End: 2017-05-22

## 2017-05-22 RX ORDER — DEXTROSE MONOHYDRATE 25 G/50ML
50 INJECTION, SOLUTION INTRAVENOUS
Status: DISCONTINUED | OUTPATIENT
Start: 2017-05-22 | End: 2017-05-22

## 2017-05-22 RX ORDER — BUPIVACAINE HYDROCHLORIDE 5 MG/ML
INJECTION, SOLUTION EPIDURAL; INTRACAUDAL AS NEEDED
Status: DISCONTINUED | OUTPATIENT
Start: 2017-05-22 | End: 2017-05-22

## 2017-05-22 RX ORDER — DIAZEPAM 5 MG/1
5 TABLET ORAL AS NEEDED
Status: DISCONTINUED | OUTPATIENT
Start: 2017-05-22 | End: 2017-05-22 | Stop reason: HOSPADM

## 2017-05-22 NOTE — BRIEF OP NOTE
Pre-Operative Diagnosis: RIGHT BREAST CANCER     Post-Operative Diagnosis: RIGHT BREAST CANCER     Procedure Performed:   Procedure(s):  WIRE LOCALIZATION RIGHT BREAST LUMPECTOMY, SENTINEL LYMPH NODE BIOPSY    Surgeon(s) and Role:     * Praneeth Everett,

## 2017-05-22 NOTE — ANESTHESIA POSTPROCEDURE EVALUATION
[address] [facility] Patient Status:  Hospital Outpatient Surgery   Age/Gender 73year old female MRN [id number]   [facility] SURGERY Attending Shane Flores, [address] [facility] Se Day # 0 PCP Swapna Cruz MD       Anesthesia Post-op N

## 2017-05-22 NOTE — H&P
History & Physical Examination    Patient Name: Cal Hampton  MRN: [id number]  [facility]: [id number]  YOB: 1945    Diagnosis: right breast cancer          Prescriptions prior to admission:  Vitamin D3 2000 units Oral Cap Take 2,000 Units by mouth [MAR Hold] Glucose-Vitamin C (DEX-4) 4-0.006 g chewable tab 8 tablet 8 tablet Oral Q15 Min PRN   [MAR Hold] clindamycin (CLEOCIN) in D5W 900mg/50ml premix 900 mg Intravenous Once   [MAR Hold] diazepam (VALIUM) tab 5 mg 5 mg Oral PRN   lidocaine-priloca Mother      on nose   • heart disease [Other] [OTHER] Father      CHF        Smoking status: Former Smoker     Smokeless tobacco: Never Used    Comment: in high school for 1 year    Alcohol Use: Yes  0.0 oz/week    0 Standard drinks or equivalent per week

## 2017-05-22 NOTE — IMAGING NOTE
Prior to arriving, [facility] performed [place] Lymph Node mapping of breast for SLN biopsy. Procedure explained and all questions answered. Pt verbalized understanding. Emotional support provided and pt tolerated procedure well with some discomfort.    Colleen

## 2017-05-22 NOTE — ANESTHESIA PREPROCEDURE EVALUATION
PRE-OP EVALUATION    Patient Name: Angelita Gayle    Pre-op Diagnosis: RIGHT BREAST CANCER    Procedure(s):  WIRE LOCALIZATION RIGHT BREAST LUMPECTOMY, SENTINEL LYMPH NODE BIOPSY    Surgeon(s) and Role:     * Cristofer Mckeon MD - Primary    Pre-op vitals TERELL (42) on 5/17/2017 12:33:21 PM          Exercise tolerance: good     MET: >4    (+) obesity  (+) hypertension and well controlled  (+) hyperlipidemia                                  Endo/Other      (+) diabetes and well controlled, type 2, inspection  No notable dental history. Dental appliance(s): upper dentures       Pulmonary    Pulmonary exam normal.  Breath sounds clear to auscultation bilaterally.                Other findings            ASA: 2   Plan: general  NPO status verified and

## 2017-05-23 NOTE — OPERATIVE REPORT
[facility]    PATIENT'S NAME: Brooklyn Turcios   ATTENDING PHYSICIAN: Shiv Gregory M.D. OPERATING PHYSICIAN: Shiv Gregory M.D.    PATIENT ACCOUNT#:   [de-identified]    LOCATION:  [address] [place] [address] 10  MEDICAL RECORD #:   [id number] closed in layers using absorbable sutures. Steri-Strips and a sterile dressing were provided. The patient tolerated the procedure well. She was taken to the recovery room for observation.     Dictated By Shi Caballero M.D.  d: 05/22/2017 21:08:49  t:

## 2017-06-02 ENCOUNTER — TELEPHONE (OUTPATIENT)
Dept: HEMATOLOGY/ONCOLOGY | Facility: HOSPITAL | Age: 72
End: 2017-06-02

## 2017-06-14 ENCOUNTER — OFFICE VISIT (OUTPATIENT)
Dept: HEMATOLOGY/ONCOLOGY | Age: 72
End: 2017-06-14
Attending: SPECIALIST
Payer: MEDICARE

## 2017-06-14 ENCOUNTER — HOSPITAL ENCOUNTER (OUTPATIENT)
Dept: RADIATION ONCOLOGY | Age: 72
Discharge: HOME | End: 2017-06-14
Attending: RADIOLOGY
Payer: MEDICARE

## 2017-06-14 VITALS
BODY MASS INDEX: 29.48 KG/M2 | SYSTOLIC BLOOD PRESSURE: 136 MMHG | TEMPERATURE: 98 F | WEIGHT: 160.19 LBS | HEIGHT: 62 IN | HEART RATE: 86 BPM | DIASTOLIC BLOOD PRESSURE: 86 MMHG | OXYGEN SATURATION: 96 % | RESPIRATION RATE: 20 BRPM

## 2017-06-14 VITALS — BODY MASS INDEX: 30.21 KG/M2 | WEIGHT: 160 LBS | HEIGHT: 61 IN

## 2017-06-14 DIAGNOSIS — Z17.0 MALIGNANT NEOPLASM OF UPPER-OUTER QUADRANT OF RIGHT BREAST IN FEMALE, ESTROGEN RECEPTOR POSITIVE (HCC): Primary | ICD-10-CM

## 2017-06-14 DIAGNOSIS — C50.411 MALIGNANT NEOPLASM OF UPPER-OUTER QUADRANT OF RIGHT BREAST IN FEMALE, ESTROGEN RECEPTOR POSITIVE (HCC): Primary | ICD-10-CM

## 2017-06-14 PROCEDURE — 99214 OFFICE O/P EST MOD 30 MIN: CPT

## 2017-06-14 PROCEDURE — 99205 OFFICE O/P NEW HI 60 MIN: CPT | Performed by: SPECIALIST

## 2017-06-14 NOTE — PROGRESS NOTES
Nursing Consultation Note  Patient: Jorge Hernandez  YOB: 1945  Age: 73year old  Radiation Oncologist: Dr. Cynthia Rosen  Referring Physician: Dr. Dedra Carlson (PCP), Dr. Omar Jones  Diagnosis: [address] [facility] 4/25/17     right breast   • Visual impairment      glasses   • Anesthesia complication      difficulty waking up   • Breast cancer [facility])      R breast   Surgical History:    Past Surgical History    OTHER  01,05    Comment left, torn meniscus    D & C 11 y/o  Menopause: 61 y/o  OCP use x 1 year, no fertility meds or HRT use  Breast feed: YES  BRA SIZE: 38B    Current Outpatient Prescriptions:  Vitamin D3 2000 units Oral Cap Take 2,000 Units by mouth daily.  Disp:  Rfl:    NON FORMULARY Milk Thistle 1 cap Psychiatric/Behavioral: Negative.       Vital Signs: /86 mmHg  Pulse 86  Temp(Src) 98.1 °F (36.7 °C) (Tympanic)  Resp 20  Ht 1.575 m (5' 2\")  Wt 72.666 kg (160 lb 3.2 oz)  BMI 29.29 kg/m2  SpO2 96%    Wt Readings from Last 6 Encounters:  06/14/17 :

## 2017-06-14 NOTE — PROGRESS NOTES
Patient is here today for consult with Dr. Shay Suarez. Patient denies pain. Medication list and medical history were reviewed and updated.      Education Record    Learner:  Patient, Spouse and Family Member    Disease / Diagnosis: Consult Breast cancer    Bar

## 2017-06-14 NOTE — PROGRESS NOTES
[facility] Report of Consultation      Patient Name: Charo Ray   YOB: 1945  Medical Record Number: [id number]  Consulting Physician: Hoang Joe. Demetrio Arrington M.D. Referring Physician: Polly Brizuela M.D.     Date of Consultation: 6/1 nightly. Disp: 90 tablet Rfl: 1   Glucose Blood (RONALDO CONTOUR NEXT TEST) In Vitro Strip Test once a day. Use as directed. Disp: 100 strip Rfl: 1   Lancets (UNILET COMFORTOUCH LANCET) Does not apply Misc 1 each by In Vitro route daily.  Disp: 100 each Rfl: changing skin lesions; no nail changes. Neurologic           No headache, blurred vision, areas of focal weakness or numbness, peripheral neuropathy, changes in gait. Psychiatric          No new or worsening depression, mg, mood swings, insomnia. .Right breast ultrasound guided biopsy recommendation was discussed with the patient at the time of her exam. Dr. Dasha Dumont nurse was notified on 4/13/17. CONCLUSION:    BIRADS Code 5: HIGHLY SUGGESTIVE OF MALIGNANCY.  HIGH PROBABLITY OF MALIGNANCY.       Pa cancer coordinator Sunitha Shown with her decision so that we might be able to help her schedule appropriate follow up. 2.   Emotional well being: Patient's emotional well being was assessed.   No issues requiring acute psychosocial intervention were ident

## 2017-06-16 NOTE — CONSULTS
[facility]    PATIENT'S NAME: María Elena Daniel   RADIATION ONCOLOGIST: Dipti Lopez M.D.    PATIENT ACCOUNT #: [de-identified] [facility]   MEDICAL RECORD #: [id number] YOB: 1945   CONSULTATION DATE: 06/14/2017       RADI radiating smaller foci extending out in a corona around the central tumor. All margins were visualized. There was no tumor at ink, but several of the tiny tumor extensions were less than 1 mm, at 1 mm, or 1.5 mm from the edges.   The margins were spec bra size is 38B. MEDICATIONS:  Vitamin D3, pravastatin, losartan, metformin, meclizine, omega-3, aspirin 81 mg daily, glucosamine and chondroitin, tramadol, chlorhexidine gluconate throat solution.       ALLERGIES:  The patient is allergic to morphine percussion and palpation. ASSESSMENT:  The patient is a 72-year-old female with stage II invasive ductal carcinoma of the right breast, grade 2. She has T2N0M0 disease, primary tumor size measured 3.2 cm.   However, she has multiple margins that are le

## 2017-06-30 ENCOUNTER — TELEPHONE (OUTPATIENT)
Dept: RADIATION ONCOLOGY | Age: 72
End: 2017-06-30

## 2017-07-05 ENCOUNTER — TELEPHONE (OUTPATIENT)
Dept: RADIATION ONCOLOGY | Age: 72
End: 2017-07-05

## 2017-07-05 NOTE — TELEPHONE ENCOUNTER
Pt left a message that she does not want to proceed with radiation treatment.  Dr Peace Duke informed

## 2017-08-11 ENCOUNTER — OFFICE VISIT (OUTPATIENT)
Dept: FAMILY MEDICINE CLINIC | Facility: CLINIC | Age: 72
End: 2017-08-11

## 2017-08-11 ENCOUNTER — TELEPHONE (OUTPATIENT)
Dept: FAMILY MEDICINE CLINIC | Facility: CLINIC | Age: 72
End: 2017-08-11

## 2017-08-11 VITALS
HEIGHT: 61.5 IN | WEIGHT: 148 LBS | HEART RATE: 72 BPM | DIASTOLIC BLOOD PRESSURE: 70 MMHG | SYSTOLIC BLOOD PRESSURE: 124 MMHG | BODY MASS INDEX: 27.58 KG/M2 | RESPIRATION RATE: 12 BRPM

## 2017-08-11 DIAGNOSIS — C50.411 MALIGNANT NEOPLASM OF UPPER-OUTER QUADRANT OF RIGHT BREAST IN FEMALE, ESTROGEN RECEPTOR POSITIVE (HCC): ICD-10-CM

## 2017-08-11 DIAGNOSIS — E11.649 TYPE 2 DIABETES MELLITUS WITH HYPOGLYCEMIA WITHOUT COMA, WITHOUT LONG-TERM CURRENT USE OF INSULIN (HCC): ICD-10-CM

## 2017-08-11 DIAGNOSIS — E78.2 MIXED HYPERLIPIDEMIA: ICD-10-CM

## 2017-08-11 DIAGNOSIS — I10 ESSENTIAL HYPERTENSION: Primary | ICD-10-CM

## 2017-08-11 DIAGNOSIS — Z17.0 MALIGNANT NEOPLASM OF UPPER-OUTER QUADRANT OF RIGHT BREAST IN FEMALE, ESTROGEN RECEPTOR POSITIVE (HCC): ICD-10-CM

## 2017-08-11 PROCEDURE — 99214 OFFICE O/P EST MOD 30 MIN: CPT | Performed by: FAMILY MEDICINE

## 2017-08-11 RX ORDER — LANCETS
1 EACH MISCELLANEOUS DAILY
Qty: 200 EACH | Refills: 1 | Status: SHIPPED | OUTPATIENT
Start: 2017-08-11 | End: 2018-01-26

## 2017-08-11 NOTE — PATIENT INSTRUCTIONS
Encourage consideration of radiation and hormonal treatment for reducing breast cancer recurrence due to size of tumor and how close it was to the edges of the surgical specimen margin. WE can refer back to Dr. Vikki Mccarthy and/or Dr. Yoshi Brown at any time.

## 2017-08-11 NOTE — PROGRESS NOTES
Delfina High IS A 73year old female [address] Patient presents with:  Weight Loss  Blood Sugar       History of present illness:     Dr. Negrito Brewster did pt's surgery. She states she is cancer free. Daughter, an RN, recommended him.  Did not want chemo, RT or date: D & C      Comment: normal  8/9/11 9/12: KNEE REPLACEMENT SURGERY      Comment: right, left  3/17/2015: LAPAROSCOPIC CHOLECYSTECTOMY N/A      Comment: Procedure: LAPAROSCOPIC CHOLECYSTECTOMY;                 Surgeon: Kelle Saab MD;  Location: E disease[other] [OTHER] Father      CHF       Social history:         Social History  Social History   Marital status:   Spouse name: N/A    Years of education: N/A  Number of children: 3     Occupational History  retired, worked with pharmaceutical Pathology                                Case: [id number]                                   Authorizing Provider:  Praneeth Everett MD         Collected:           05/22/2017 12:43 PM          Ordering Location:     [facility] Surgery    Received: Specify Clock Position of Tumor Site:    10 o'clock                               Presence of Invasive Carcinoma:                                     Histologic Type:     Invasive mammary carcinoma of no special type (ductal, not otherwise specified) Invasion:    Not identified                                 Treatment Effect: Response to Presurgical (Neoadjuvant) Therapy:    No known presurgical therapy                                                        MARGINS                               Invasi histologically                               Distant Metastasis (pM):    Not applicable - pM cannot be determined from the submitted specimen(s)                                                        Comment(s)                             Comment(s):    pT Method:    Dual probe assay                                   Average Number of HER2 Signals per Cell:    3.6                                   Average Number of CEP17 Signals per Cell:    2.9                                   HER2 / CEP17 Ratio:    1.24 Vitro route daily. Bid, diagnosis type 2 diabetes with hypoglycemia, without insulin  -     Glucose Blood (RONALDO CONTOUR NEXT TEST) In Vitro Strip;  Test twice daily due to hypoglycemia  -     HEMOGLOBIN A1C; Future  -     MICROALB/CREAT RATIO, RANDOM URINE

## 2017-08-16 NOTE — PROGRESS NOTES
Re: breast malignancy in assessment: Pt plans to follow up with Dr. Angela Orellana, has seen radiation & medical oncology, who recommend chemo, adjuvant RT and hormonal treatment.  Pt declines this and still feels comfortable with her decision not to consider chem

## 2017-09-29 ENCOUNTER — LAB ENCOUNTER (OUTPATIENT)
Dept: LAB | Age: 72
End: 2017-09-29
Attending: FAMILY MEDICINE
Payer: MEDICARE

## 2017-09-29 DIAGNOSIS — K76.0 FATTY LIVER: ICD-10-CM

## 2017-09-29 DIAGNOSIS — E11.649 TYPE 2 DIABETES MELLITUS WITH HYPOGLYCEMIA WITHOUT COMA, WITHOUT LONG-TERM CURRENT USE OF INSULIN (HCC): ICD-10-CM

## 2017-09-29 DIAGNOSIS — I10 ESSENTIAL HYPERTENSION: ICD-10-CM

## 2017-09-29 DIAGNOSIS — E78.2 MIXED HYPERLIPIDEMIA: ICD-10-CM

## 2017-09-29 DIAGNOSIS — R79.89 LFT ELEVATION: ICD-10-CM

## 2017-09-29 PROCEDURE — 36415 COLL VENOUS BLD VENIPUNCTURE: CPT

## 2017-09-29 PROCEDURE — 83036 HEMOGLOBIN GLYCOSYLATED A1C: CPT

## 2017-09-29 PROCEDURE — 82570 ASSAY OF URINE CREATININE: CPT

## 2017-09-29 PROCEDURE — 80061 LIPID PANEL: CPT

## 2017-09-29 PROCEDURE — 82043 UR ALBUMIN QUANTITATIVE: CPT

## 2017-09-29 PROCEDURE — 80053 COMPREHEN METABOLIC PANEL: CPT

## 2017-09-29 PROCEDURE — 82248 BILIRUBIN DIRECT: CPT

## 2017-10-04 ENCOUNTER — OFFICE VISIT (OUTPATIENT)
Dept: FAMILY MEDICINE CLINIC | Facility: CLINIC | Age: 72
End: 2017-10-04

## 2017-10-04 VITALS
RESPIRATION RATE: 16 BRPM | SYSTOLIC BLOOD PRESSURE: 100 MMHG | HEART RATE: 64 BPM | DIASTOLIC BLOOD PRESSURE: 62 MMHG | BODY MASS INDEX: 26.25 KG/M2 | OXYGEN SATURATION: 97 % | TEMPERATURE: 99 F | HEIGHT: 61.5 IN | WEIGHT: 140.81 LBS

## 2017-10-04 DIAGNOSIS — Z13.220 SCREENING, LIPID: ICD-10-CM

## 2017-10-04 DIAGNOSIS — E11.9 CONTROLLED TYPE 2 DIABETES MELLITUS WITHOUT COMPLICATION, WITHOUT LONG-TERM CURRENT USE OF INSULIN (HCC): ICD-10-CM

## 2017-10-04 DIAGNOSIS — R73.01 IMPAIRED FASTING GLUCOSE: Primary | ICD-10-CM

## 2017-10-04 PROCEDURE — 99214 OFFICE O/P EST MOD 30 MIN: CPT | Performed by: FAMILY MEDICINE

## 2017-10-04 NOTE — PROGRESS NOTES
Alok Gonzales IS A 73year old female HERE FOR Patient presents with:  Diabetes: f/u       History of present illness:     DM: Lost weight, plans 5k walk in October. No chest pain, dyspnea. Stabilizes in low 140s weight wise. Glucs 90s-106. Not on meds. medications:       Current Outpatient Prescriptions:  Lancets (UNILET COMFORTOUCH LANCET) Does not apply Misc 1 each by In Vitro route daily.  Bid, diagnosis type 2 diabetes with hypoglycemia, without insulin Disp: 200 each Rfl: 1   Glucose Blood (RONALDO CON No    Sleep Concern Yes    Comment: 5-6 hr/night    Stress Concern No    Comment: retired but busy    Weight Concern Yes    Comment: goal 150    Special Diet No    Comment: low carb     Exercise Yes    Comment: daily; water aerobic 3x/week, walks 2 miles o to be resolved. Keep up the good work with healthy eating & exercise. Recheck in April with labs & exam.     Continue off losartan, pravastatin and metformin for now. We may need to restart pravastatin if cholesterol rises significantly.

## 2017-10-04 NOTE — PATIENT INSTRUCTIONS
At this point you appear to have \"diabetes in remission,\" but we still follow guidelines for diabetes monitoring every 6 months for at least a year (eye exam, blood & urine tests) before we declare your diabetes to be resolved.  Keep up the good work with

## 2017-10-31 ENCOUNTER — TELEPHONE (OUTPATIENT)
Dept: FAMILY MEDICINE CLINIC | Facility: CLINIC | Age: 72
End: 2017-10-31

## 2017-10-31 NOTE — TELEPHONE ENCOUNTER
For a few days patient has been having a brown discharge. Only one pad a day. No other s/s. Does not have a gyne.      Future Appointments  Date Time Provider Darren Ana   11/3/2017 11:30 AM Celestina Jansen MD [facility] [address]

## 2017-11-03 ENCOUNTER — OFFICE VISIT (OUTPATIENT)
Dept: FAMILY MEDICINE CLINIC | Facility: CLINIC | Age: 72
End: 2017-11-03

## 2017-11-03 VITALS
DIASTOLIC BLOOD PRESSURE: 72 MMHG | HEIGHT: 61.5 IN | BODY MASS INDEX: 26.09 KG/M2 | WEIGHT: 140 LBS | SYSTOLIC BLOOD PRESSURE: 118 MMHG | HEART RATE: 94 BPM

## 2017-11-03 DIAGNOSIS — N89.8 VAGINAL DISCHARGE: Primary | ICD-10-CM

## 2017-11-03 DIAGNOSIS — N95.0 POSTMENOPAUSAL BLEEDING: ICD-10-CM

## 2017-11-03 PROCEDURE — 88175 CYTOPATH C/V AUTO FLUID REDO: CPT | Performed by: FAMILY MEDICINE

## 2017-11-03 PROCEDURE — 87624 HPV HI-RISK TYP POOLED RSLT: CPT | Performed by: FAMILY MEDICINE

## 2017-11-03 PROCEDURE — 99213 OFFICE O/P EST LOW 20 MIN: CPT | Performed by: FAMILY MEDICINE

## 2017-11-03 PROCEDURE — 81003 URINALYSIS AUTO W/O SCOPE: CPT | Performed by: FAMILY MEDICINE

## 2017-11-03 NOTE — PROGRESS NOTES
34 Rama Sherman IS A 73year old female [address] [place] Patient presents with:  Vaginal Discharge: Meryle Murders vaginal discharge started 3 days ago       History of present illness:     Meryle Murders vaginal discharge Tuesday, was moderately heavy, used pad to catch it.  Arabella Gutierrez medications:       Current Outpatient Prescriptions:  Lancets (UNILET COMFORTOUCH LANCET) Does not apply Misc 1 each by In Vitro route daily.  Bid, diagnosis type 2 diabetes with hypoglycemia, without insulin Disp: 200 each Rfl: 1   Vitamin D3 2000 units Or No    Sleep Concern Yes    Comment: 5-6 hr/night    Stress Concern No    Comment: retired but busy    Weight Concern Yes    Comment: goal 150    Special Diet No    Comment: low carb     Exercise Yes    Comment: daily; water aerobic 3x/week, walks 3 miles o US PELVIS (TRANSABDOMINAL PELVIS)  (AFO=23945);  Future  -     THINPREP PAP SMEAR B  -     HPV HIGH RISK , THIN PREP COLLECTION  -     Cancel: OBG - INTERNAL  -     OBG - INTERNAL          Patient Instructions   Pap done, get ultrasound, see Dr. Zully Rosas fo

## 2017-11-09 ENCOUNTER — HOSPITAL ENCOUNTER (OUTPATIENT)
Dept: ULTRASOUND IMAGING | Age: 72
Discharge: HOME | End: 2017-11-09
Attending: FAMILY MEDICINE
Payer: MEDICARE

## 2017-11-09 ENCOUNTER — TELEPHONE (OUTPATIENT)
Dept: FAMILY MEDICINE CLINIC | Facility: CLINIC | Age: 72
End: 2017-11-09

## 2017-11-09 DIAGNOSIS — N95.0 POSTMENOPAUSAL BLEEDING: ICD-10-CM

## 2017-11-09 PROCEDURE — 76856 US EXAM PELVIC COMPLETE: CPT | Performed by: FAMILY MEDICINE

## 2017-11-09 PROCEDURE — 76830 TRANSVAGINAL US NON-OB: CPT | Performed by: FAMILY MEDICINE

## 2017-11-15 ENCOUNTER — HOSPITAL ENCOUNTER (OUTPATIENT)
Dept: MAMMOGRAPHY | Age: 72
Discharge: HOME | End: 2017-11-15
Attending: SURGERY
Payer: MEDICARE

## 2017-11-15 DIAGNOSIS — C50.411 MALIGNANT NEOPLASM OF UPPER-OUTER QUADRANT OF RIGHT BREAST IN FEMALE, ESTROGEN RECEPTOR POSITIVE (HCC): ICD-10-CM

## 2017-11-15 DIAGNOSIS — Z17.0 MALIGNANT NEOPLASM OF UPPER-OUTER QUADRANT OF RIGHT BREAST IN FEMALE, ESTROGEN RECEPTOR POSITIVE (HCC): ICD-10-CM

## 2017-11-15 DIAGNOSIS — Z98.890 S/P LUMPECTOMY, RIGHT BREAST: ICD-10-CM

## 2017-11-15 PROCEDURE — 77062 BREAST TOMOSYNTHESIS BI: CPT | Performed by: SURGERY

## 2017-11-15 PROCEDURE — 77066 DX MAMMO INCL CAD BI: CPT | Performed by: SURGERY

## 2017-12-11 ENCOUNTER — TELEPHONE (OUTPATIENT)
Dept: FAMILY MEDICINE CLINIC | Facility: CLINIC | Age: 72
End: 2017-12-11

## 2017-12-11 NOTE — TELEPHONE ENCOUNTER
Patients dentist called wants information regarding patient. Advised to get patient permission and fax what is wanted. Have note in chart from Dr Beba Hernandez patient doesn't need antibiotics for dental work.  Patient will be having extensive dental work done

## 2017-12-11 NOTE — TELEPHONE ENCOUNTER
Received a fax from [employer], but it has not been signed by the patient. Placing in Triage In Basket as it looks like they took the original call earlier today and might know more details.

## 2017-12-11 NOTE — TELEPHONE ENCOUNTER
Faxed back with information from Dr Juan Manuel Roberts. If anything else is needed they can call Dr Josiah Schneider.

## 2018-01-11 ENCOUNTER — OFFICE VISIT (OUTPATIENT)
Dept: OBGYN CLINIC | Facility: CLINIC | Age: 73
End: 2018-01-11

## 2018-01-11 VITALS
HEART RATE: 78 BPM | SYSTOLIC BLOOD PRESSURE: 132 MMHG | WEIGHT: 140 LBS | DIASTOLIC BLOOD PRESSURE: 70 MMHG | BODY MASS INDEX: 26.78 KG/M2 | HEIGHT: 60.75 IN

## 2018-01-11 DIAGNOSIS — N95.0 POSTMENOPAUSAL BLEEDING: Primary | ICD-10-CM

## 2018-01-11 PROCEDURE — 99203 OFFICE O/P NEW LOW 30 MIN: CPT | Performed by: OBSTETRICS & GYNECOLOGY

## 2018-01-11 RX ORDER — DIMENHYDRINATE 50 MG
TABLET ORAL DAILY
COMMUNITY

## 2018-01-12 ENCOUNTER — TELEPHONE (OUTPATIENT)
Dept: OBGYN CLINIC | Facility: CLINIC | Age: 73
End: 2018-01-12

## 2018-01-12 RX ORDER — MULTIVITAMIN
1 TABLET ORAL DAILY
COMMUNITY

## 2018-01-12 NOTE — TELEPHONE ENCOUNTER
Surgery scheduled per Dr. Srinivas Acuna for 01/18/18 @ 330 PM.  Referral entered, form faxed, added to calendar. Patient notified. No further questions or concerns at this time.

## 2018-01-12 NOTE — PROGRESS NOTES
Karen Rae is a 81year old female. HPI:   Patient presents with:  Vaginal Bleeding: bleeding since 11/2/2017       Referral from Dr Emilia Coates with pmb. Started out light but now is heavier, about 4 pads a day. No pain.  Never happened before  She had Problem Relation Age of Onset   • Skin cancer Mother      on nose - not melanoma   • hypoplastic anemia [OTHER] Mother    • hip fracture [OTHER] Father 80      at 80   • heart disease [OTHER] Father      CHF   • Breast Cancer Self 81      Social Hist normal. Pap smear not done. HPV screen not. Uterus:  av and normal size. Shape:  normal.  Adnexa:  no masses or tenderness. Cul de sac:  normal.  R/V:  neg. No hemorrhoids. ASSESSMENT/PLAN:   Assessment     1.  Postmenopausal bleeding        Sched

## 2018-01-13 ENCOUNTER — APPOINTMENT (OUTPATIENT)
Dept: LAB | Age: 73
End: 2018-01-13
Payer: MEDICARE

## 2018-01-13 DIAGNOSIS — N95.0 POST-MENOPAUSAL BLEEDING: ICD-10-CM

## 2018-01-13 LAB
ERYTHROCYTE [DISTWIDTH] IN BLOOD BY AUTOMATED COUNT: 12.4 % (ref 11.5–16)
HCT VFR BLD AUTO: 42.5 % (ref 34–50)
HGB BLD-MCNC: 13.9 G/DL (ref 12–16)
MCH RBC QN AUTO: 32.3 PG (ref 27–33.2)
MCHC RBC AUTO-ENTMCNC: 32.7 G/DL (ref 31–37)
MCV RBC AUTO: 98.6 FL (ref 81–100)
PLATELET # BLD AUTO: 239 10(3)UL (ref 150–450)
RBC # BLD AUTO: 4.31 X10(6)UL (ref 3.8–5.1)
RED CELL DISTRIBUTION WIDTH-SD: 44.8 FL (ref 35.1–46.3)
WBC # BLD AUTO: 5.7 X10(3) UL (ref 4–13)

## 2018-01-13 PROCEDURE — 36415 COLL VENOUS BLD VENIPUNCTURE: CPT

## 2018-01-13 PROCEDURE — 85027 COMPLETE CBC AUTOMATED: CPT

## 2018-01-15 NOTE — TELEPHONE ENCOUNTER
I dont think will be done with Dr Trinity fong by then  Unless they are just adding Ms Parker to follow Rudi Cope in that room

## 2018-01-16 NOTE — TELEPHONE ENCOUNTER
I have a case at 7:30--josé miguel  I have a case at 9:30 mine  Then if they think Sonu Bower can go at 10:15 ok  But its up to the [place]

## 2018-01-16 NOTE — TELEPHONE ENCOUNTER
Contacted patient. Let her know that surgery was moved to approximately 0930 on Thursday instead of 1530. Patient states understanding and agrees to earlier time. Calendar updated.

## 2018-01-16 NOTE — TELEPHONE ENCOUNTER
Per surgery scheduling, they can follow Dr. Rosa M Dominique case that is TF Dr. Nurys Friedman case. So approx start time would be 1015. Please advise if you would like to move case to this time.  Thank you

## 2018-01-17 ENCOUNTER — ANESTHESIA EVENT (OUTPATIENT)
Dept: SURGERY | Facility: HOSPITAL | Age: 73
End: 2018-01-17

## 2018-01-17 NOTE — H&P
[facility] Patient Status:  Hospital Outpatient Surgery    1945 MRN [id number]   [facility] SURGERY Attending Faith Bryan MD   Hosp Day # 0 PCP Richa Myrick MD     SUBJECTIVE: capsule daily  Disp:  Rfl:    Meclizine HCl 12.5 MG Oral Tab Take 1 tablet (12.5 mg total) by mouth 3 (three) times daily as needed for Dizziness. Disp: 30 tablet Rfl: 0   Omega 3 1000 MG Oral Cap Take  by mouth daily.  Disp:  Rfl:    aspirin 81 MG Oral Tab Yogesh Breaux MD;  Location: [address] [place] [address]               OR  2017: LUMPECTOMY RIGHT Right      Comment: Invasive ductal  2009: TOTAL KNEE REPLACEMENT Left  2010: TOTAL KNEE REPLACEMENT Right  No date: TUBAL LIGATION    Past OB History:  OB History    Para signs.   Pelvic:cervix normal in appearance, external genitalia normal, no adnexal masses or tenderness, no cervical motion tenderness, rectovaginal septum normal, uterus normal size, shape, and consistency and vagina normal without discharge  Extremities: 239.0 01/13/2018    Chem:  Lab Results  Component Value Date    09/29/2017   K 4.6 09/29/2017    09/29/2017   CO2 27.0 09/29/2017   BUN 12 09/29/2017   AST 20 09/29/2017   ALT 26 09/29/2017   ALB 3.6 09/29/2017         ASSESSMENT:  Post menopau

## 2018-01-18 ENCOUNTER — SURGERY (OUTPATIENT)
Age: 73
End: 2018-01-18

## 2018-01-18 ENCOUNTER — HOSPITAL ENCOUNTER (OUTPATIENT)
Facility: HOSPITAL | Age: 73
Setting detail: HOSPITAL OUTPATIENT SURGERY
Discharge: HOME | End: 2018-01-18
Attending: OBSTETRICS & GYNECOLOGY | Admitting: OBSTETRICS & GYNECOLOGY
Payer: MEDICARE

## 2018-01-18 ENCOUNTER — ANESTHESIA (OUTPATIENT)
Dept: SURGERY | Facility: HOSPITAL | Age: 73
End: 2018-01-18

## 2018-01-18 VITALS
SYSTOLIC BLOOD PRESSURE: 109 MMHG | HEIGHT: 60.75 IN | DIASTOLIC BLOOD PRESSURE: 55 MMHG | TEMPERATURE: 98 F | HEART RATE: 63 BPM | RESPIRATION RATE: 16 BRPM | OXYGEN SATURATION: 100 % | BODY MASS INDEX: 26.18 KG/M2 | WEIGHT: 136.88 LBS

## 2018-01-18 DIAGNOSIS — N95.0 POST-MENOPAUSAL BLEEDING: Primary | ICD-10-CM

## 2018-01-18 LAB
GLUCOSE BLD-MCNC: 84 MG/DL (ref 65–99)
GLUCOSE BLD-MCNC: 86 MG/DL (ref 65–99)

## 2018-01-18 PROCEDURE — 88305 TISSUE EXAM BY PATHOLOGIST: CPT | Performed by: OBSTETRICS & GYNECOLOGY

## 2018-01-18 PROCEDURE — 87075 CULTR BACTERIA EXCEPT BLOOD: CPT | Performed by: OBSTETRICS & GYNECOLOGY

## 2018-01-18 PROCEDURE — 87070 CULTURE OTHR SPECIMN AEROBIC: CPT | Performed by: OBSTETRICS & GYNECOLOGY

## 2018-01-18 PROCEDURE — 87205 SMEAR GRAM STAIN: CPT | Performed by: OBSTETRICS & GYNECOLOGY

## 2018-01-18 PROCEDURE — 0UDB7ZX EXTRACTION OF ENDOMETRIUM, VIA NATURAL OR ARTIFICIAL OPENING, DIAGNOSTIC: ICD-10-PCS | Performed by: OBSTETRICS & GYNECOLOGY

## 2018-01-18 PROCEDURE — 82962 GLUCOSE BLOOD TEST: CPT

## 2018-01-18 PROCEDURE — 0UJD8ZZ INSPECTION OF UTERUS AND CERVIX, VIA NATURAL OR ARTIFICIAL OPENING ENDOSCOPIC: ICD-10-PCS | Performed by: OBSTETRICS & GYNECOLOGY

## 2018-01-18 RX ORDER — DOXYCYCLINE HYCLATE 100 MG
100 TABLET ORAL 2 TIMES DAILY
Qty: 10 TABLET | Refills: 0 | Status: SHIPPED | OUTPATIENT
Start: 2018-01-18 | End: 2018-01-23

## 2018-01-18 RX ORDER — NALOXONE HYDROCHLORIDE 0.4 MG/ML
80 INJECTION, SOLUTION INTRAMUSCULAR; INTRAVENOUS; SUBCUTANEOUS AS NEEDED
Status: DISCONTINUED | OUTPATIENT
Start: 2018-01-18 | End: 2018-01-18

## 2018-01-18 RX ORDER — SODIUM CHLORIDE, SODIUM LACTATE, POTASSIUM CHLORIDE, CALCIUM CHLORIDE 600; 310; 30; 20 MG/100ML; MG/100ML; MG/100ML; MG/100ML
INJECTION, SOLUTION INTRAVENOUS CONTINUOUS
Status: DISCONTINUED | OUTPATIENT
Start: 2018-01-18 | End: 2018-01-18

## 2018-01-18 RX ORDER — ONDANSETRON 2 MG/ML
4 INJECTION INTRAMUSCULAR; INTRAVENOUS AS NEEDED
Status: DISCONTINUED | OUTPATIENT
Start: 2018-01-18 | End: 2018-01-18

## 2018-01-18 RX ORDER — IBUPROFEN 600 MG/1
600 TABLET ORAL EVERY 6 HOURS PRN
Qty: 20 TABLET | Refills: 0 | Status: SHIPPED | OUTPATIENT
Start: 2018-01-18 | End: 2018-12-05 | Stop reason: ALTCHOICE

## 2018-01-18 RX ORDER — DIPHENHYDRAMINE HYDROCHLORIDE 50 MG/ML
12.5 INJECTION INTRAMUSCULAR; INTRAVENOUS AS NEEDED
Status: DISCONTINUED | OUTPATIENT
Start: 2018-01-18 | End: 2018-01-18

## 2018-01-18 RX ORDER — CLINDAMYCIN PHOSPHATE 900 MG/50ML
INJECTION INTRAVENOUS
Status: DISCONTINUED | OUTPATIENT
Start: 2018-01-18 | End: 2018-01-18

## 2018-01-18 RX ORDER — LABETALOL HYDROCHLORIDE 5 MG/ML
5 INJECTION, SOLUTION INTRAVENOUS EVERY 5 MIN PRN
Status: DISCONTINUED | OUTPATIENT
Start: 2018-01-18 | End: 2018-01-18

## 2018-01-18 RX ORDER — ACETAMINOPHEN 325 MG/1
650 TABLET ORAL ONCE
Status: COMPLETED | OUTPATIENT
Start: 2018-01-18 | End: 2018-01-18

## 2018-01-18 RX ORDER — DEXTROSE MONOHYDRATE 25 G/50ML
50 INJECTION, SOLUTION INTRAVENOUS
Status: DISCONTINUED | OUTPATIENT
Start: 2018-01-18 | End: 2018-01-18

## 2018-01-18 NOTE — DISCHARGE SUMMARY
[facility]  Discharge Summary    [facility] Patient Status:  Hospital Outpatient Surgery    1945 MRN [id number]   Location [facility] Attending Suni Miranda MD   [facility] Day # 0 PCP Nando Saleh, hypoglycemia  Qty: 200 strip Refills: 1  Associated Diagnoses:Type 2 diabetes mellitus with hypoglycemia without coma, without long-term current use of insulin (HCC)    Vitamin D3 2000 units Oral Cap  Take 2,000 Units by mouth daily.     NON FORMULARY  Milk

## 2018-01-18 NOTE — OPERATIVE REPORT
Preoperative Diagnosis: post menopausal bleeding, thickened endometrium  Postoperative Diagnosis: Same with pyometrium and vascular endometrial thickening    Primary surgeon: Yvan Rubi  Operation: Operative Hysteroscopy  Fractional Dilatation and Curettage

## 2018-01-18 NOTE — BRIEF OP NOTE
Pre-Operative Diagnosis: POST MENOPAUSAL BLEEDING     Post-Operative Diagnosis: same   Procedure Performed:   Procedure(s):  DIAGNOSTIC HYSTEROSCOPY, DILATION & CURETTAGE    Surgeon(s) and Role:     Estefany De La O MD - Primary    Assistant(s):

## 2018-01-18 NOTE — ANESTHESIA PREPROCEDURE EVALUATION
PRE-OP EVALUATION    Patient Name: Hannah Espinoza    Pre-op Diagnosis: POST MENOPAUSAL BLEEDING    Procedure(s):  DIAGNOSTIC HYSTEROSCOPY, DILATION & CURETTAGE    Surgeon(s) and Role:     Trae Bernstein MD - Primary    Pre-op vitals reviewed.   Temp: asthma  (-) COPD            (-) sleep apnea       Neuro/Psych          (-) CVA     (-) seizures                     Past Surgical History:  No date: ARTHROSCOPY OF JOINT UNLISTED Left      Comment: MENISCUS REPAIR  No date: BREAST BIOPSY  No date: CHOLECYS

## 2018-01-18 NOTE — ANESTHESIA POSTPROCEDURE EVALUATION
[address] Patient Status:  Hospital Outpatient Surgery   Age/Gender 81year old female MRN [id number]   [facility] SURGERY Attending Faith Bryan MD   Hosp Day # 0 PCP Richa Myrick MD       Anesthesia Post-o

## 2018-01-22 ENCOUNTER — TELEPHONE (OUTPATIENT)
Dept: OBGYN CLINIC | Facility: CLINIC | Age: 73
End: 2018-01-22

## 2018-01-22 NOTE — TELEPHONE ENCOUNTER
Dr. Rick Barrientos office is requesting a referral for the patient's appointment Monday, 1/29/18    The CPT code for the appointment is [phone]    Please call Andrew Fraser at Dr. Rick Barrientos office if you have any questions  [phone]    Fax: [phone]

## 2018-01-26 ENCOUNTER — OFFICE VISIT (OUTPATIENT)
Dept: OBGYN CLINIC | Facility: CLINIC | Age: 73
End: 2018-01-26

## 2018-01-26 VITALS
HEIGHT: 60 IN | WEIGHT: 142 LBS | BODY MASS INDEX: 27.88 KG/M2 | DIASTOLIC BLOOD PRESSURE: 66 MMHG | SYSTOLIC BLOOD PRESSURE: 118 MMHG

## 2018-01-26 DIAGNOSIS — C54.1 ENDOMETRIAL CANCER DETERMINED BY UTERINE BIOPSY (HCC): Primary | ICD-10-CM

## 2018-01-26 PROCEDURE — 99212 OFFICE O/P EST SF 10 MIN: CPT | Performed by: OBSTETRICS & GYNECOLOGY

## 2018-01-26 NOTE — PROGRESS NOTES
Consult visit re: recent surgery    She feels ok  Still with bleeding  Has appt with Dr Rubens Gamboa on Monday    Path, procedure reviewed  expected treatment for staging reviewed    Questions answered.    with issues regarding the [facility]  I

## 2018-02-02 ENCOUNTER — APPOINTMENT (OUTPATIENT)
Dept: GENERAL RADIOLOGY | Age: 73
End: 2018-02-02
Attending: EMERGENCY MEDICINE
Payer: MEDICARE

## 2018-02-02 ENCOUNTER — LABORATORY ENCOUNTER (OUTPATIENT)
Dept: LAB | Age: 73
End: 2018-02-02
Payer: MEDICARE

## 2018-02-02 ENCOUNTER — HOSPITAL ENCOUNTER (EMERGENCY)
Age: 73
Discharge: HOME | End: 2018-02-02
Attending: EMERGENCY MEDICINE
Payer: MEDICARE

## 2018-02-02 VITALS
TEMPERATURE: 100 F | WEIGHT: 137 LBS | DIASTOLIC BLOOD PRESSURE: 58 MMHG | BODY MASS INDEX: 26.9 KG/M2 | RESPIRATION RATE: 16 BRPM | OXYGEN SATURATION: 99 % | HEART RATE: 69 BPM | HEIGHT: 60 IN | SYSTOLIC BLOOD PRESSURE: 120 MMHG

## 2018-02-02 DIAGNOSIS — S80.02XA CONTUSION OF LEFT KNEE, INITIAL ENCOUNTER: ICD-10-CM

## 2018-02-02 DIAGNOSIS — C54.1 ENDOMETRIAL CARCINOMA (HCC): ICD-10-CM

## 2018-02-02 DIAGNOSIS — R19.7 DIARRHEA, UNSPECIFIED TYPE: Primary | ICD-10-CM

## 2018-02-02 DIAGNOSIS — R55 SYNCOPE, VASOVAGAL: ICD-10-CM

## 2018-02-02 DIAGNOSIS — N30.00 ACUTE CYSTITIS WITHOUT HEMATURIA: ICD-10-CM

## 2018-02-02 LAB
ALBUMIN SERPL-MCNC: 3.6 G/DL (ref 3.5–4.8)
ALP LIVER SERPL-CCNC: 70 U/L (ref 55–142)
ALT SERPL-CCNC: 25 U/L (ref 14–54)
ANTIBODY SCREEN: NEGATIVE
AST SERPL-CCNC: 21 U/L (ref 15–41)
ATRIAL RATE: 60 BPM
BASOPHILS # BLD AUTO: 0.05 X10(3) UL (ref 0–0.1)
BASOPHILS NFR BLD AUTO: 1 %
BILIRUB SERPL-MCNC: 1 MG/DL (ref 0.1–2)
BILIRUB UR QL STRIP.AUTO: NEGATIVE
BUN BLD-MCNC: 18 MG/DL (ref 8–20)
BUN BLD-MCNC: 21 MG/DL (ref 8–20)
CALCIUM BLD-MCNC: 9.2 MG/DL (ref 8.3–10.3)
CALCIUM BLD-MCNC: 9.6 MG/DL (ref 8.3–10.3)
CHLORIDE: 102 MMOL/L (ref 101–111)
CHLORIDE: 105 MMOL/L (ref 101–111)
CO2: 31 MMOL/L (ref 22–32)
CO2: 31 MMOL/L (ref 22–32)
COLOR UR AUTO: YELLOW
CREAT BLD-MCNC: 0.87 MG/DL (ref 0.55–1.02)
CREAT BLD-MCNC: 0.94 MG/DL (ref 0.55–1.02)
EOSINOPHIL # BLD AUTO: 0.04 X10(3) UL (ref 0–0.3)
EOSINOPHIL NFR BLD AUTO: 0.8 %
ERYTHROCYTE [DISTWIDTH] IN BLOOD BY AUTOMATED COUNT: 12.3 % (ref 11.5–16)
ERYTHROCYTE [DISTWIDTH] IN BLOOD BY AUTOMATED COUNT: 12.4 % (ref 11.5–16)
GLUCOSE BLD-MCNC: 110 MG/DL (ref 70–99)
GLUCOSE BLD-MCNC: 88 MG/DL (ref 70–99)
GLUCOSE UR STRIP.AUTO-MCNC: NEGATIVE MG/DL
HCT VFR BLD AUTO: 42.6 % (ref 34–50)
HCT VFR BLD AUTO: 46.3 % (ref 34–50)
HGB BLD-MCNC: 14.4 G/DL (ref 12–16)
HGB BLD-MCNC: 15.5 G/DL (ref 12–16)
IMMATURE GRANULOCYTE COUNT: 0.01 X10(3) UL (ref 0–1)
IMMATURE GRANULOCYTE RATIO %: 0.2 %
KETONES UR STRIP.AUTO-MCNC: NEGATIVE MG/DL
LYMPHOCYTES # BLD AUTO: 1.54 X10(3) UL (ref 0.9–4)
LYMPHOCYTES NFR BLD AUTO: 30.4 %
M PROTEIN MFR SERPL ELPH: 7.8 G/DL (ref 6.1–8.3)
MCH RBC QN AUTO: 31.7 PG (ref 27–33.2)
MCH RBC QN AUTO: 32.4 PG (ref 27–33.2)
MCHC RBC AUTO-ENTMCNC: 33.5 G/DL (ref 31–37)
MCHC RBC AUTO-ENTMCNC: 33.8 G/DL (ref 31–37)
MCV RBC AUTO: 93.8 FL (ref 81–100)
MCV RBC AUTO: 96.7 FL (ref 81–100)
MONOCYTES # BLD AUTO: 0.43 X10(3) UL (ref 0.1–0.6)
MONOCYTES NFR BLD AUTO: 8.5 %
NEUTROPHIL ABS PRELIM: 2.99 X10 (3) UL (ref 1.3–6.7)
NEUTROPHILS # BLD AUTO: 2.99 X10(3) UL (ref 1.3–6.7)
NEUTROPHILS NFR BLD AUTO: 59.1 %
NITRITE UR QL STRIP.AUTO: NEGATIVE
P AXIS: 35 DEGREES
P-R INTERVAL: 158 MS
PH UR STRIP.AUTO: 8 [PH] (ref 4.5–8)
PLATELET # BLD AUTO: 218 10(3)UL (ref 150–450)
PLATELET # BLD AUTO: 222 10(3)UL (ref 150–450)
POTASSIUM SERPL-SCNC: 3.8 MMOL/L (ref 3.6–5.1)
POTASSIUM SERPL-SCNC: 4.7 MMOL/L (ref 3.6–5.1)
Q-T INTERVAL: 416 MS
QRS DURATION: 84 MS
QTC CALCULATION (BEZET): 416 MS
R AXIS: 57 DEGREES
RBC # BLD AUTO: 4.54 X10(6)UL (ref 3.8–5.1)
RBC # BLD AUTO: 4.79 X10(6)UL (ref 3.8–5.1)
RBC #/AREA URNS AUTO: >10 /HPF
RED CELL DISTRIBUTION WIDTH-SD: 43 FL (ref 35.1–46.3)
RED CELL DISTRIBUTION WIDTH-SD: 43.9 FL (ref 35.1–46.3)
RH BLOOD TYPE: NEGATIVE
SODIUM SERPL-SCNC: 140 MMOL/L (ref 136–144)
SODIUM SERPL-SCNC: 141 MMOL/L (ref 136–144)
SP GR UR STRIP.AUTO: 1.02 (ref 1–1.03)
T AXIS: 66 DEGREES
TROPONIN: <0.046 NG/ML (ref ?–0.05)
UROBILINOGEN UR STRIP.AUTO-MCNC: 0.2 MG/DL
VENTRICULAR RATE: 60 BPM
WBC # BLD AUTO: 4.3 X10(3) UL (ref 4–13)
WBC # BLD AUTO: 5.1 X10(3) UL (ref 4–13)
WBC #/AREA URNS AUTO: >50 /HPF

## 2018-02-02 PROCEDURE — 86901 BLOOD TYPING SEROLOGIC RH(D): CPT

## 2018-02-02 PROCEDURE — 87086 URINE CULTURE/COLONY COUNT: CPT | Performed by: EMERGENCY MEDICINE

## 2018-02-02 PROCEDURE — 36415 COLL VENOUS BLD VENIPUNCTURE: CPT

## 2018-02-02 PROCEDURE — 80053 COMPREHEN METABOLIC PANEL: CPT

## 2018-02-02 PROCEDURE — 84484 ASSAY OF TROPONIN QUANT: CPT | Performed by: EMERGENCY MEDICINE

## 2018-02-02 PROCEDURE — 73562 X-RAY EXAM OF KNEE 3: CPT | Performed by: EMERGENCY MEDICINE

## 2018-02-02 PROCEDURE — 93010 ELECTROCARDIOGRAM REPORT: CPT

## 2018-02-02 PROCEDURE — 85025 COMPLETE CBC W/AUTO DIFF WBC: CPT | Performed by: EMERGENCY MEDICINE

## 2018-02-02 PROCEDURE — 71046 X-RAY EXAM CHEST 2 VIEWS: CPT | Performed by: EMERGENCY MEDICINE

## 2018-02-02 PROCEDURE — 86850 RBC ANTIBODY SCREEN: CPT

## 2018-02-02 PROCEDURE — 99285 EMERGENCY DEPT VISIT HI MDM: CPT

## 2018-02-02 PROCEDURE — 93005 ELECTROCARDIOGRAM TRACING: CPT

## 2018-02-02 PROCEDURE — 86900 BLOOD TYPING SEROLOGIC ABO: CPT

## 2018-02-02 PROCEDURE — 80048 BASIC METABOLIC PNL TOTAL CA: CPT

## 2018-02-02 PROCEDURE — 81001 URINALYSIS AUTO W/SCOPE: CPT | Performed by: EMERGENCY MEDICINE

## 2018-02-02 PROCEDURE — 85027 COMPLETE CBC AUTOMATED: CPT

## 2018-02-02 RX ORDER — NITROFURANTOIN 25; 75 MG/1; MG/1
100 CAPSULE ORAL 2 TIMES DAILY
Qty: 20 CAPSULE | Refills: 0 | Status: SHIPPED | OUTPATIENT
Start: 2018-02-02 | End: 2018-02-12

## 2018-02-02 NOTE — ED PROVIDER NOTES
Patient Seen in: THE [facility] In [place]    History   Patient presents with:  Syncope (cardiovascular, neurologic)    Stated Complaint: Syncopal episode    HPI    74-year-old white female who presents emergency room today for complaint WEIGHT LOSS   • Unspecified essential hypertension     started meds 59\   • Vertigo    • Visual impairment     glasses       Past Surgical History:  No date: ARTHROSCOPY OF JOINT UNLISTED Left      Comment: MENISCUS REPAIR  No date: BREAST BIOPSY  No date: or guarding noted no hepatosplenomegaly is noted. No masses are noted. No hernias are palpated. Rectal exam shows liquid  brown stool which is guaiac negative. Extremity exam reveals no clubbing cyanosis or edema.   Patient has good radial pulses noted consolidation.        Left knee x-ray reveals:     FINDINGS:    BONES:  Osseous structures are intact.  There is a total left knee replacement.  Suggestion of a trace amount of suprapatellar fluid.  Mild medial soft tissue prominence.      Impression     C

## 2018-02-19 NOTE — H&P
[facility]    PATIENT'S NAME: Goldie Gutierrez   ATTENDING PHYSICIAN: Marisol Man M.D.    PATIENT ACCOUNT#:   [de-identified]    LOCATION:  OR   [facility]  MEDICAL RECORD #:   [id number]       YOB: 1945  ADMISSION DATE:       02/20/2018    H

## 2018-02-20 ENCOUNTER — ANESTHESIA EVENT (OUTPATIENT)
Dept: SURGERY | Facility: HOSPITAL | Age: 73
End: 2018-02-20
Payer: MEDICARE

## 2018-02-20 ENCOUNTER — ANESTHESIA (OUTPATIENT)
Dept: SURGERY | Facility: HOSPITAL | Age: 73
End: 2018-02-20
Payer: MEDICARE

## 2018-02-20 ENCOUNTER — HOSPITAL ENCOUNTER (OUTPATIENT)
Facility: HOSPITAL | Age: 73
Discharge: HOME | End: 2018-02-21
Attending: OBSTETRICS & GYNECOLOGY | Admitting: OBSTETRICS & GYNECOLOGY
Payer: MEDICARE

## 2018-02-20 ENCOUNTER — SURGERY (OUTPATIENT)
Age: 73
End: 2018-02-20

## 2018-02-20 ENCOUNTER — TELEPHONE (OUTPATIENT)
Dept: OBGYN CLINIC | Facility: CLINIC | Age: 73
End: 2018-02-20

## 2018-02-20 DIAGNOSIS — C54.1 ENDOMETRIAL CARCINOMA (HCC): Primary | ICD-10-CM

## 2018-02-20 LAB
GLUCOSE BLD-MCNC: 125 MG/DL (ref 65–99)
GLUCOSE BLD-MCNC: 163 MG/DL (ref 65–99)

## 2018-02-20 PROCEDURE — 0UT94ZZ RESECTION OF UTERUS, PERCUTANEOUS ENDOSCOPIC APPROACH: ICD-10-PCS | Performed by: OBSTETRICS & GYNECOLOGY

## 2018-02-20 PROCEDURE — 0UT74ZZ RESECTION OF BILATERAL FALLOPIAN TUBES, PERCUTANEOUS ENDOSCOPIC APPROACH: ICD-10-PCS | Performed by: OBSTETRICS & GYNECOLOGY

## 2018-02-20 PROCEDURE — 07TC4ZZ RESECTION OF PELVIS LYMPHATIC, PERCUTANEOUS ENDOSCOPIC APPROACH: ICD-10-PCS | Performed by: OBSTETRICS & GYNECOLOGY

## 2018-02-20 PROCEDURE — 99213 OFFICE O/P EST LOW 20 MIN: CPT | Performed by: HOSPITALIST

## 2018-02-20 PROCEDURE — 8E0W4CZ ROBOTIC ASSISTED PROCEDURE OF TRUNK REGION, PERCUTANEOUS ENDOSCOPIC APPROACH: ICD-10-PCS | Performed by: OBSTETRICS & GYNECOLOGY

## 2018-02-20 PROCEDURE — 0UT24ZZ RESECTION OF BILATERAL OVARIES, PERCUTANEOUS ENDOSCOPIC APPROACH: ICD-10-PCS | Performed by: OBSTETRICS & GYNECOLOGY

## 2018-02-20 RX ORDER — ONDANSETRON 2 MG/ML
4 INJECTION INTRAMUSCULAR; INTRAVENOUS EVERY 8 HOURS PRN
Status: DISCONTINUED | OUTPATIENT
Start: 2018-02-20 | End: 2018-02-21

## 2018-02-20 RX ORDER — SODIUM CHLORIDE, SODIUM LACTATE, POTASSIUM CHLORIDE, CALCIUM CHLORIDE 600; 310; 30; 20 MG/100ML; MG/100ML; MG/100ML; MG/100ML
INJECTION, SOLUTION INTRAVENOUS CONTINUOUS
Status: DISCONTINUED | OUTPATIENT
Start: 2018-02-20 | End: 2018-02-21

## 2018-02-20 RX ORDER — DEXTROSE MONOHYDRATE 25 G/50ML
50 INJECTION, SOLUTION INTRAVENOUS
Status: DISCONTINUED | OUTPATIENT
Start: 2018-02-20 | End: 2018-02-20 | Stop reason: HOSPADM

## 2018-02-20 RX ORDER — BUPIVACAINE HYDROCHLORIDE 5 MG/ML
INJECTION, SOLUTION EPIDURAL; INTRACAUDAL AS NEEDED
Status: DISCONTINUED | OUTPATIENT
Start: 2018-02-20 | End: 2018-02-20 | Stop reason: HOSPADM

## 2018-02-20 RX ORDER — DEXTROSE MONOHYDRATE 25 G/50ML
50 INJECTION, SOLUTION INTRAVENOUS
Status: DISCONTINUED | OUTPATIENT
Start: 2018-02-20 | End: 2018-02-21

## 2018-02-20 RX ORDER — ZOLPIDEM TARTRATE 5 MG/1
5 TABLET ORAL NIGHTLY PRN
Status: DISCONTINUED | OUTPATIENT
Start: 2018-02-20 | End: 2018-02-21

## 2018-02-20 RX ORDER — ACETAMINOPHEN AND CODEINE PHOSPHATE 300; 30 MG/1; MG/1
1 TABLET ORAL AS NEEDED
Status: DISCONTINUED | OUTPATIENT
Start: 2018-02-20 | End: 2018-02-20 | Stop reason: HOSPADM

## 2018-02-20 RX ORDER — HEPARIN SODIUM 5000 [USP'U]/ML
5000 INJECTION, SOLUTION INTRAVENOUS; SUBCUTANEOUS ONCE
Status: COMPLETED | OUTPATIENT
Start: 2018-02-20 | End: 2018-02-20

## 2018-02-20 RX ORDER — PROCHLORPERAZINE 25 MG
25 SUPPOSITORY, RECTAL RECTAL EVERY 12 HOURS PRN
Status: DISCONTINUED | OUTPATIENT
Start: 2018-02-20 | End: 2018-02-21

## 2018-02-20 RX ORDER — ONDANSETRON 2 MG/ML
4 INJECTION INTRAMUSCULAR; INTRAVENOUS AS NEEDED
Status: DISCONTINUED | OUTPATIENT
Start: 2018-02-20 | End: 2018-02-20 | Stop reason: HOSPADM

## 2018-02-20 RX ORDER — GENTAMICIN SULFATE 100 MG/100ML
INJECTION, SOLUTION INTRAVENOUS
Status: DISCONTINUED | OUTPATIENT
Start: 2018-02-20 | End: 2018-02-20

## 2018-02-20 RX ORDER — HYDROMORPHONE HYDROCHLORIDE 1 MG/ML
0.5 INJECTION, SOLUTION INTRAMUSCULAR; INTRAVENOUS; SUBCUTANEOUS EVERY 2 HOUR PRN
Status: DISCONTINUED | OUTPATIENT
Start: 2018-02-20 | End: 2018-02-21

## 2018-02-20 RX ORDER — SODIUM CHLORIDE, SODIUM LACTATE, POTASSIUM CHLORIDE, CALCIUM CHLORIDE 600; 310; 30; 20 MG/100ML; MG/100ML; MG/100ML; MG/100ML
INJECTION, SOLUTION INTRAVENOUS CONTINUOUS
Status: DISCONTINUED | OUTPATIENT
Start: 2018-02-20 | End: 2018-02-20 | Stop reason: HOSPADM

## 2018-02-20 RX ORDER — NALOXONE HYDROCHLORIDE 0.4 MG/ML
80 INJECTION, SOLUTION INTRAMUSCULAR; INTRAVENOUS; SUBCUTANEOUS AS NEEDED
Status: DISCONTINUED | OUTPATIENT
Start: 2018-02-20 | End: 2018-02-20 | Stop reason: HOSPADM

## 2018-02-20 RX ORDER — SODIUM CHLORIDE 9 MG/ML
INJECTION, SOLUTION INTRAVENOUS CONTINUOUS
Status: DISCONTINUED | OUTPATIENT
Start: 2018-02-20 | End: 2018-02-21

## 2018-02-20 RX ORDER — LABETALOL HYDROCHLORIDE 5 MG/ML
5 INJECTION, SOLUTION INTRAVENOUS EVERY 5 MIN PRN
Status: DISCONTINUED | OUTPATIENT
Start: 2018-02-20 | End: 2018-02-20 | Stop reason: HOSPADM

## 2018-02-20 RX ORDER — HYDROMORPHONE HYDROCHLORIDE 1 MG/ML
INJECTION, SOLUTION INTRAMUSCULAR; INTRAVENOUS; SUBCUTANEOUS
Status: DISPENSED
Start: 2018-02-20 | End: 2018-02-21

## 2018-02-20 RX ORDER — ENOXAPARIN SODIUM 100 MG/ML
40 INJECTION SUBCUTANEOUS DAILY
Status: DISCONTINUED | OUTPATIENT
Start: 2018-02-21 | End: 2018-02-21

## 2018-02-20 RX ORDER — CLINDAMYCIN PHOSPHATE 900 MG/50ML
900 INJECTION INTRAVENOUS EVERY 8 HOURS
Status: COMPLETED | OUTPATIENT
Start: 2018-02-20 | End: 2018-02-21

## 2018-02-20 RX ORDER — IBUPROFEN 200 MG
200 TABLET ORAL EVERY 4 HOURS PRN
Status: DISCONTINUED | OUTPATIENT
Start: 2018-02-20 | End: 2018-02-21

## 2018-02-20 RX ORDER — INDOCYANINE GREEN AND WATER 25 MG
KIT INJECTION AS NEEDED
Status: DISCONTINUED | OUTPATIENT
Start: 2018-02-20 | End: 2018-02-20 | Stop reason: HOSPADM

## 2018-02-20 RX ORDER — CLINDAMYCIN PHOSPHATE 900 MG/50ML
INJECTION INTRAVENOUS
Status: DISCONTINUED | OUTPATIENT
Start: 2018-02-20 | End: 2018-02-20

## 2018-02-20 RX ORDER — DIPHENHYDRAMINE HYDROCHLORIDE 50 MG/ML
12.5 INJECTION INTRAMUSCULAR; INTRAVENOUS EVERY 4 HOURS PRN
Status: DISCONTINUED | OUTPATIENT
Start: 2018-02-20 | End: 2018-02-21

## 2018-02-20 RX ORDER — ACETAMINOPHEN AND CODEINE PHOSPHATE 300; 30 MG/1; MG/1
2 TABLET ORAL AS NEEDED
Status: DISCONTINUED | OUTPATIENT
Start: 2018-02-20 | End: 2018-02-20 | Stop reason: HOSPADM

## 2018-02-20 RX ORDER — CLINDAMYCIN PHOSPHATE 900 MG/50ML
900 INJECTION INTRAVENOUS ONCE
Status: DISCONTINUED | OUTPATIENT
Start: 2018-02-20 | End: 2018-02-20 | Stop reason: HOSPADM

## 2018-02-20 RX ORDER — ENOXAPARIN SODIUM 100 MG/ML
40 INJECTION SUBCUTANEOUS DAILY
Status: DISCONTINUED | OUTPATIENT
Start: 2018-02-21 | End: 2018-02-20

## 2018-02-20 RX ORDER — MIDAZOLAM HYDROCHLORIDE 1 MG/ML
1 INJECTION INTRAMUSCULAR; INTRAVENOUS EVERY 5 MIN PRN
Status: DISCONTINUED | OUTPATIENT
Start: 2018-02-20 | End: 2018-02-20 | Stop reason: HOSPADM

## 2018-02-20 RX ORDER — IBUPROFEN 400 MG/1
400 TABLET ORAL EVERY 4 HOURS PRN
Status: DISCONTINUED | OUTPATIENT
Start: 2018-02-20 | End: 2018-02-21

## 2018-02-20 RX ORDER — PROCHLORPERAZINE MALEATE 10 MG
10 TABLET ORAL EVERY 12 HOURS PRN
Status: DISCONTINUED | OUTPATIENT
Start: 2018-02-20 | End: 2018-02-21

## 2018-02-20 RX ORDER — IBUPROFEN 600 MG/1
600 TABLET ORAL EVERY 4 HOURS PRN
Status: DISCONTINUED | OUTPATIENT
Start: 2018-02-20 | End: 2018-02-21

## 2018-02-20 RX ORDER — ONDANSETRON 4 MG/1
4 TABLET, FILM COATED ORAL EVERY 8 HOURS PRN
Status: DISCONTINUED | OUTPATIENT
Start: 2018-02-20 | End: 2018-02-21

## 2018-02-20 NOTE — ANESTHESIA POSTPROCEDURE EVALUATION
[address] Patient Status:  Surgery Admit   Age/Gender 81year old female MRN [id number]   [facility] SURGERY Attending Jose F Da Silva MD   [address] Day # 0 PCP Violetta Oppenheim, MD       Anesthesia Post-op Note    Proc

## 2018-02-20 NOTE — ANESTHESIA PREPROCEDURE EVALUATION
PRE-OP EVALUATION    Patient Name: Hannah Ser    Pre-op Diagnosis: ENDOMETRIAL CARCINOMA      Procedure(s):  ROBOTIC ASSISTED LAPAROSCOPIC TOTAL HYSTERECTOMY, BILATERAL SALPINGO-OOPHORECTOMY, INJECTION OF GREEN DYE TO IDENTIFY SENTINEL NODE, POSSIBLE Pulmonary                           Neuro/Psych                   (+) headaches                 Past Surgical History:  No date: ARTHROSCOPY OF JOINT UNLISTED Left      Comment: MENISCUS REPAIR  No date: BREAST BIOPSY  No date: CHOLECYSTECTOMY  No date: D Discussed increased risk of dental trauma/damage given state of poor dentition. Discussed with pt and all questions answered.       Present on Admission:  **None**

## 2018-02-20 NOTE — PROGRESS NOTES
[facility]  Brief Op Note    Brady Vázquez Location: OR   [facility] [id number] MRN [id number]   Admission Date 2/20/2018 Operation Date 2/20/2018   Attending Physician Agustín Coombs MD Operating Physician Corey Joy MD     Pre-Operative Diagnosi

## 2018-02-21 VITALS
HEIGHT: 60 IN | HEART RATE: 71 BPM | TEMPERATURE: 98 F | OXYGEN SATURATION: 100 % | WEIGHT: 139.56 LBS | DIASTOLIC BLOOD PRESSURE: 56 MMHG | RESPIRATION RATE: 16 BRPM | SYSTOLIC BLOOD PRESSURE: 115 MMHG | BODY MASS INDEX: 27.4 KG/M2

## 2018-02-21 LAB
ERYTHROCYTE [DISTWIDTH] IN BLOOD BY AUTOMATED COUNT: 11.9 % (ref 11.5–16)
EST. AVERAGE GLUCOSE BLD GHB EST-MCNC: 108 MG/DL (ref 68–126)
GLUCOSE BLD-MCNC: 122 MG/DL (ref 65–99)
GLUCOSE BLD-MCNC: 91 MG/DL (ref 65–99)
HBA1C MFR BLD HPLC: 5.4 % (ref ?–5.7)
HCT VFR BLD AUTO: 36.1 % (ref 34–50)
HGB BLD-MCNC: 12.5 G/DL (ref 12–16)
MCH RBC QN AUTO: 31.5 PG (ref 27–33.2)
MCHC RBC AUTO-ENTMCNC: 34.6 G/DL (ref 31–37)
MCV RBC AUTO: 90.9 FL (ref 81–100)
NON GYNE INTERPRETATION: NEGATIVE
PLATELET # BLD AUTO: 191 10(3)UL (ref 150–450)
RBC # BLD AUTO: 3.97 X10(6)UL (ref 3.8–5.1)
RED CELL DISTRIBUTION WIDTH-SD: 39.8 FL (ref 35.1–46.3)
WBC # BLD AUTO: 6.6 X10(3) UL (ref 4–13)

## 2018-02-21 PROCEDURE — 99213 OFFICE O/P EST LOW 20 MIN: CPT | Performed by: HOSPITALIST

## 2018-02-21 RX ORDER — TRAMADOL HYDROCHLORIDE 50 MG/1
50 TABLET ORAL EVERY 6 HOURS PRN
Qty: 20 TABLET | Refills: 0 | Status: SHIPPED | OUTPATIENT
Start: 2018-02-21 | End: 2018-02-21

## 2018-02-21 RX ORDER — TRAMADOL HYDROCHLORIDE 50 MG/1
50 TABLET ORAL EVERY 6 HOURS PRN
Status: DISCONTINUED | OUTPATIENT
Start: 2018-02-21 | End: 2018-02-21

## 2018-02-21 NOTE — PROGRESS NOTES
NURSING DISCHARGE NOTE    Discharged pt via wheelchair to [employer]. Accompanied by spouse. Belongings at hand. IV catheter d/c'd; catheter intact. Discharge instruction and education given to pt and verbalizes understanding.  Script given at hand and

## 2018-02-21 NOTE — PAYOR COMM NOTE
--------------  ADMISSION REVIEW     Payor: [employer] MEDICARE ADV IHP IPA  Subscriber #:  [id number]  Authorization Number: N/A    Admit date: N/A  Admit time: N/A       Admitting Physician: Adam Damian MD  Attending Physician:  Adam Damian MD edema of feet. No DVT. IMPRESSION:  Grade 2 carcinoma of the endometrium. PLAN:  Robotic-assisted hysterectomy, bilateral salpingo-oophorectomy with sentinel and possible pelvic and aortic lymphadenectomy with frozen section.      MAYO Chaudhary 2/20/2018 1800 Given 25 mcg Intravenous Gatito Garcia RN    2/20/2018 1753 Given 25 mcg Intravenous Gatito Garcia RN      gentamicin in 0.9% NaCl premix 100mg/100ml     Date Action Dose Route User    2/20/2018 1534 New Bag 260 mg Intravenous Jess Asp

## 2018-02-21 NOTE — PROGRESS NOTES
[facility]  Progress Note     Dai Bliss Patient Status:  Outpatient in a Bed    1945 MRN [id number]   [facility] 3NW-A Attending Aura Smith MD   Hosp Day # 0 PCP Muna Raymundo MD     Chief Complaint: Medical m planning    Quality:  · DVT Prophylaxis: lovenox  · CODE status: Full  · Echevarria: no  · Central line: no    Estimated date of discharge: TBD  Discharge is dependent on: progress  At this point Ms. Caitlyn Jeter is expected to be discharge to: home    Plan of care d

## 2018-02-21 NOTE — OPERATIVE REPORT
[facility]    PATIENT'S NAME: Navya Avalos   ATTENDING PHYSICIAN: Corey Joy M.D. OPERATING PHYSICIAN: Corey Joy M.D.    PATIENT ACCOUNT#:   [de-identified]    LOCATION:  3NW-A [facility]  MEDICAL RECORD #:   [id number]       DATE OF MUKUND starting the surgery, cervix was injected with IC green dye at 3 o'clock and 9 o'clock positions. The inspection in the retroperitoneum revealed no mapping.   Then, similar dissection was performed on the left side by clamping and coagulating the round lig the procedure, there were no complications. All the counts were correct. Estimated blood loss less than 5 mL.      Dictated By Halle Lee M.D.  d: 02/21/2018 15:47:03  t: 02/21/2018 17:44:34  [facility] [id number]  LJ/

## 2018-02-21 NOTE — PROGRESS NOTES
NURSING ADMISSION NOTE      Patient admitted via Bed. Oriented to room. Safety precautions initiated. Bed in low position. Call light in reach. Admission database completed. Denies history of HTN and high cholesterol. Family present at bedside.

## 2018-02-21 NOTE — PROGRESS NOTES
[facility]  Progress Note    Loreto Canales Patient Status:  Outpatient in a Bed    1945 MRN [id number]   [facility] 3NW-A Attending Celeste Vegas MD   Hosp Day # 0 PCP Fabrizio Steven MD       SUBJECTIVE:  Comfortable  Pa tab 8 tablet 8 tablet Oral Q15 Min PRN   DiphenhydrAMINE HCl (BENADRYL) injection 12.5 mg 12.5 mg Intravenous Q4H PRN   0.9%  NaCl infusion  Intravenous Continuous   ibuprofen (MOTRIN) tab 200 mg 200 mg Oral Q4H PRN   Or      ibuprofen (MOTRIN) tab 400 mg

## 2018-02-21 NOTE — CONSULTS
[facility]  [address] [facility] Patient Status:  Outpatient in a Bed    1945 MRN [id number]   [facility] 3NW-A Attending Francesca Osborne MD   Hosp Day # 0 PCP Norah Snyder MD     Reason for consult: post op pa JOINT UNLISTED Left      Comment: MENISCUS REPAIR  No date: BREAST BIOPSY  No date: CHOLECYSTECTOMY  No date: D & C      Comment: normal  3/17/2015: LAPAROSCOPIC CHOLECYSTECTOMY N/A      Comment: Procedure: LAPAROSCOPIC CHOLECYSTECTOMY;                 [address] Rfl:    aspirin 81 MG Oral Tab Take 81 mg by mouth daily. Disp:  Rfl:    Glucosamine-Chondroitin (GLUCOSAMINE CHONDR COMPLEX OR) Take  by mouth daily. Disp:  Rfl:        Review of Systems:   A comprehensive 14 point review of systems was completed.     Pert lovenox  · CODE status: full  · Echevarria: no    Plan of care discussed with patient    Daphne May MD  2/20/2018

## 2018-03-19 ENCOUNTER — TELEPHONE (OUTPATIENT)
Dept: OBGYN CLINIC | Facility: CLINIC | Age: 73
End: 2018-03-19

## 2018-03-19 NOTE — TELEPHONE ENCOUNTER
Dr. Luisa Villanueva follow up letter dated 3/1/18   Letter in Dr. Tawana Fraser office in [facility]

## 2018-03-22 ENCOUNTER — MED REC SCAN ONLY (OUTPATIENT)
Dept: FAMILY MEDICINE CLINIC | Facility: CLINIC | Age: 73
End: 2018-03-22

## 2018-03-30 ENCOUNTER — MED REC SCAN ONLY (OUTPATIENT)
Dept: OBGYN CLINIC | Facility: CLINIC | Age: 73
End: 2018-03-30

## 2018-04-04 ENCOUNTER — TELEPHONE (OUTPATIENT)
Dept: FAMILY MEDICINE CLINIC | Facility: CLINIC | Age: 73
End: 2018-04-04

## 2018-04-04 NOTE — TELEPHONE ENCOUNTER
Labs for glucose, cholesterol and whatever else she previously had done.     Pt stated she was \"taken off of her medication in November and she was told by Dr. Dilshad Cortes to get Labs done in a few months and check to see if she is doing OK off of the [employer]

## 2018-04-07 ENCOUNTER — APPOINTMENT (OUTPATIENT)
Dept: LAB | Age: 73
End: 2018-04-07
Attending: FAMILY MEDICINE
Payer: MEDICARE

## 2018-04-07 DIAGNOSIS — Z13.220 SCREENING, LIPID: ICD-10-CM

## 2018-04-07 DIAGNOSIS — E11.9 CONTROLLED TYPE 2 DIABETES MELLITUS WITHOUT COMPLICATION, WITHOUT LONG-TERM CURRENT USE OF INSULIN (HCC): ICD-10-CM

## 2018-04-07 PROCEDURE — 83036 HEMOGLOBIN GLYCOSYLATED A1C: CPT

## 2018-04-07 PROCEDURE — 82043 UR ALBUMIN QUANTITATIVE: CPT

## 2018-04-07 PROCEDURE — 82570 ASSAY OF URINE CREATININE: CPT

## 2018-04-07 PROCEDURE — 36415 COLL VENOUS BLD VENIPUNCTURE: CPT

## 2018-04-07 PROCEDURE — 80061 LIPID PANEL: CPT

## 2018-04-07 PROCEDURE — 80053 COMPREHEN METABOLIC PANEL: CPT

## 2018-04-09 ENCOUNTER — OFFICE VISIT (OUTPATIENT)
Dept: FAMILY MEDICINE CLINIC | Facility: CLINIC | Age: 73
End: 2018-04-09

## 2018-04-09 VITALS
SYSTOLIC BLOOD PRESSURE: 112 MMHG | RESPIRATION RATE: 16 BRPM | DIASTOLIC BLOOD PRESSURE: 72 MMHG | WEIGHT: 143.38 LBS | HEIGHT: 61.75 IN | OXYGEN SATURATION: 99 % | HEART RATE: 69 BPM | TEMPERATURE: 98 F | BODY MASS INDEX: 26.39 KG/M2

## 2018-04-09 DIAGNOSIS — H26.9 CATARACT OF BOTH EYES, UNSPECIFIED CATARACT TYPE: ICD-10-CM

## 2018-04-09 DIAGNOSIS — E11.9 TYPE 2 DIABETES MELLITUS WITHOUT COMPLICATION, WITHOUT LONG-TERM CURRENT USE OF INSULIN (HCC): ICD-10-CM

## 2018-04-09 DIAGNOSIS — Z17.0 MALIGNANT NEOPLASM OF UPPER-OUTER QUADRANT OF RIGHT BREAST IN FEMALE, ESTROGEN RECEPTOR POSITIVE (HCC): ICD-10-CM

## 2018-04-09 DIAGNOSIS — Z96.653 STATUS POST TOTAL BILATERAL KNEE REPLACEMENT: ICD-10-CM

## 2018-04-09 DIAGNOSIS — C50.411 MALIGNANT NEOPLASM OF UPPER-OUTER QUADRANT OF RIGHT BREAST IN FEMALE, ESTROGEN RECEPTOR POSITIVE (HCC): ICD-10-CM

## 2018-04-09 DIAGNOSIS — C54.1 MALIGNANT NEOPLASM OF ENDOMETRIUM (HCC): ICD-10-CM

## 2018-04-09 PROBLEM — N63.10 BREAST MASS, RIGHT: Status: RESOLVED | Noted: 2017-04-05 | Resolved: 2018-04-09

## 2018-04-09 PROBLEM — R79.89 LFT ELEVATION: Status: RESOLVED | Noted: 2017-04-05 | Resolved: 2018-04-09

## 2018-04-09 PROCEDURE — 99214 OFFICE O/P EST MOD 30 MIN: CPT | Performed by: FAMILY MEDICINE

## 2018-04-09 NOTE — PROGRESS NOTES
Heriberto Rincon IS A 81year old female [address] Patient presents with:  Lab Results: Go over labs       History of present illness:     Had hyst in 2018, had ca in [place]  and had had bleeding since November.  Doing well per Dr. Patrice Trotter postop, next visit i CHOLECYSTECTOMY N/A      Comment: Procedure: LAPAROSCOPIC CHOLECYSTECTOMY;                 Surgeon: Cathie Palomino MD;  Location: [facility] MAIN               OR  04/2017: LUMPECTOMY RIGHT Right      Comment: Invasive ductal  2009: TOTAL KNEE REPLACEMENT Left  2 0.20 Packs/day  For 1.00 Years     Quit date: 1/12/1968    Smokeless tobacco: Never Used    Comment: in high school for 1 year    Alcohol use No    Drug use: No    Sexual activity: Yes     Other Topics Concern     Service No    Blood Transfusions N Albumin 04/07/2018 3.8    • Sodium 04/07/2018 140    • Potassium 04/07/2018 4.4    • Chloride 04/07/2018 106    • CO2 04/07/2018 28.0    • Cholesterol, Total 04/07/2018 202*   • Triglycerides 04/07/2018 98    • HDL Cholesterol 04/07/2018 74    • LDL Choles advantage well visit. (MAPS visit).

## 2018-04-09 NOTE — PATIENT INSTRUCTIONS
We will watch labs for another year before declaring remission from diabetes. Last A1c of 6.5 was a year ago February. Otherwise you're doing well. Recheck in 6 months for labs, in 1-2 months for complete annual review of health.  Medicare advantage well

## 2018-06-12 ENCOUNTER — TELEPHONE (OUTPATIENT)
Dept: FAMILY MEDICINE CLINIC | Facility: CLINIC | Age: 73
End: 2018-06-12

## 2018-06-12 DIAGNOSIS — C54.1 MALIGNANT NEOPLASM OF ENDOMETRIUM (HCC): Primary | ICD-10-CM

## 2018-06-12 NOTE — TELEPHONE ENCOUNTER
Referral for Dr Toni Jones placed for additional visits    Left detailed message for pt with this info.

## 2018-06-12 NOTE — TELEPHONE ENCOUNTER
Was contacted by her surgeon's office. Stated she needs to have a Referral for her 6 month follow-up after Hysterectomy. Appt tomorrow Wed 6-13 at 3:30    Unsure if Referral is needed for Post-Op but patient is insistent the surgeon's office said it was.

## 2018-08-01 ENCOUNTER — OFFICE VISIT (OUTPATIENT)
Dept: FAMILY MEDICINE CLINIC | Facility: CLINIC | Age: 73
End: 2018-08-01

## 2018-08-01 VITALS
DIASTOLIC BLOOD PRESSURE: 74 MMHG | BODY MASS INDEX: 27.1 KG/M2 | SYSTOLIC BLOOD PRESSURE: 124 MMHG | HEART RATE: 72 BPM | RESPIRATION RATE: 16 BRPM | WEIGHT: 145.38 LBS | HEIGHT: 61.25 IN | OXYGEN SATURATION: 98 % | TEMPERATURE: 99 F

## 2018-08-01 DIAGNOSIS — K76.0 FATTY LIVER: ICD-10-CM

## 2018-08-01 DIAGNOSIS — Z12.11 ENCOUNTER FOR COLORECTAL CANCER SCREENING: ICD-10-CM

## 2018-08-01 DIAGNOSIS — E11.9 TYPE 2 DIABETES MELLITUS WITHOUT COMPLICATION, WITHOUT LONG-TERM CURRENT USE OF INSULIN (HCC): ICD-10-CM

## 2018-08-01 DIAGNOSIS — H26.9 CATARACT OF BOTH EYES, UNSPECIFIED CATARACT TYPE: ICD-10-CM

## 2018-08-01 DIAGNOSIS — Z12.12 ENCOUNTER FOR COLORECTAL CANCER SCREENING: ICD-10-CM

## 2018-08-01 DIAGNOSIS — E11.649 TYPE 2 DIABETES MELLITUS WITH HYPOGLYCEMIA WITHOUT COMA, WITHOUT LONG-TERM CURRENT USE OF INSULIN (HCC): ICD-10-CM

## 2018-08-01 DIAGNOSIS — R42 VERTIGO: ICD-10-CM

## 2018-08-01 DIAGNOSIS — C50.911 CANCER OF RIGHT BREAST, STAGE 2 (HCC): Primary | ICD-10-CM

## 2018-08-01 DIAGNOSIS — Z00.00 ENCOUNTER FOR ANNUAL HEALTH EXAMINATION: ICD-10-CM

## 2018-08-01 DIAGNOSIS — C55 UTERINE CARCINOMA (HCC): ICD-10-CM

## 2018-08-01 PROCEDURE — G0439 PPPS, SUBSEQ VISIT: HCPCS | Performed by: FAMILY MEDICINE

## 2018-08-01 PROCEDURE — 96160 PT-FOCUSED HLTH RISK ASSMT: CPT | Performed by: FAMILY MEDICINE

## 2018-08-01 NOTE — PROGRESS NOTES
HPI:   Mari Eden is a 81year old female who presents for a MA (Medicare Advantage) Supervisit (Once per calendar year). Doing a lot of 5k races. Drinking more fluids prevents lightheadedness. Her last annual assessment has been over 1 year:  Rivera Ron She does NOT have a Power of  for [employer] on file in [address] [place] [address] Rd.    Advance care planning including the explanation and discussion of advance directives standard forms performed Face to Face with patient and Family/surrogate (if present), and forms 3.450 12/10/2013   CREATSERUM 0.92 04/07/2018   GLU 92 04/07/2018        CBC  (most recent labs)     Lab Results  Component Value Date   WBC 6.6 02/21/2018   HGB 12.5 02/21/2018   .0 02/21/2018        ALLERGIES:   She is allergic to morphine sulfate arthroscopy of joint unlisted (Left). Her family history includes Breast Cancer (age of onset: 79) in her self; Skin cancer in her mother; heart disease in her father; hip fracture (age of onset: 80) in her father; hypoplastic anemia in her mother.    SO edema   Pulses: 2+ and symmetric   Skin: Skin color, texture, turgor normal, no rashes or lesions   Lymph nodes: Cervical, supraclavicular, and axillary nodes normal   Neurologic: Normal    and Breasts:  normal appearance, no masses or tenderness except [place]    Fatty liver--LFTs currently normal  -     MICROALB/CREAT RATIO, RANDOM URINE;  Future    Type 2 diabetes mellitus with hypoglycemia without coma, without long-term current use of insulin ([facility]    Encounter for annual health examination    Enc previous visit. No flowsheet data found. Fecal Occult Blood Annually No results found for: FOB No flowsheet data found.     Glaucoma Screening      Ophthalmology Visit Annually: Diabetics, FHx Glaucoma, AA>50, > 65 Data entered on: 2/1/2017   [place] MONITORING Internal Lab or Procedure External Lab or Procedure         Diabetes      HgbA1C  Annually HGBA1C (%)   Date Value   03/25/2014 6.8 (H)     HgbA1C (%)   Date Value   04/07/2018 5.2       No flowsheet data found.     Creat/alb ratio  Annually Malb

## 2018-08-01 NOTE — PATIENT INSTRUCTIONS
Continue screening for diabetes, repeat in 6 months (February), if stable then can consider you in remission from the diabetes. See Dr. Trell Verdin as scheduled. Have eye exam report sent to us. Recheck in 6 months after labs.     Congrats on do non-screening if indicated for medical reasons Electrocardiogram date02/02/2018 Routine EKG is not a screening covered service except at the [place] to Medicare Visit    Abdominal aortic aneurysm screening (once between ages 73-68) IPPE only No results fou preventive care reminders to display for this patient. Chlamydia  Annually if high risk No results found for: CHLAMYDIA No flowsheet data found.     Screening Mammogram      Mammogram    Recommend Annually to at least age 76, and as needed after 76 Mamm It also has the State forms available on it's website for anyone to review and print using their home computer and printer. (the forms are also available in [address])  [email or web address]  This link also has information from the [facility] [place]

## 2018-08-08 ENCOUNTER — APPOINTMENT (OUTPATIENT)
Dept: LAB | Age: 73
End: 2018-08-08
Attending: FAMILY MEDICINE
Payer: MEDICARE

## 2018-08-08 DIAGNOSIS — E11.9 TYPE 2 DIABETES MELLITUS WITHOUT COMPLICATION, WITHOUT LONG-TERM CURRENT USE OF INSULIN (HCC): ICD-10-CM

## 2018-08-08 DIAGNOSIS — K76.0 FATTY LIVER: ICD-10-CM

## 2018-08-08 LAB
ALBUMIN SERPL-MCNC: 3.7 G/DL (ref 3.5–4.8)
ALBUMIN/GLOB SERPL: 0.9 {RATIO} (ref 1–2)
ALP LIVER SERPL-CCNC: 66 U/L (ref 55–142)
ALT SERPL-CCNC: 30 U/L (ref 14–54)
ANION GAP SERPL CALC-SCNC: 8 MMOL/L (ref 0–18)
AST SERPL-CCNC: 22 U/L (ref 15–41)
BILIRUB SERPL-MCNC: 0.8 MG/DL (ref 0.1–2)
BUN BLD-MCNC: 21 MG/DL (ref 8–20)
BUN/CREAT SERPL: 23.6 (ref 10–20)
CALCIUM BLD-MCNC: 9.4 MG/DL (ref 8.3–10.3)
CHLORIDE SERPL-SCNC: 107 MMOL/L (ref 101–111)
CHOLEST SMN-MCNC: 213 MG/DL (ref ?–200)
CO2 SERPL-SCNC: 28 MMOL/L (ref 22–32)
CREAT BLD-MCNC: 0.89 MG/DL (ref 0.55–1.02)
CREAT UR-SCNC: 91.3 MG/DL
EST. AVERAGE GLUCOSE BLD GHB EST-MCNC: 105 MG/DL (ref 68–126)
GLOBULIN PLAS-MCNC: 3.9 G/DL (ref 2.5–3.7)
GLUCOSE BLD-MCNC: 90 MG/DL (ref 70–99)
HBA1C MFR BLD HPLC: 5.3 % (ref ?–5.7)
HDLC SERPL-MCNC: 64 MG/DL (ref 40–59)
LDLC SERPL CALC-MCNC: 131 MG/DL (ref ?–100)
M PROTEIN MFR SERPL ELPH: 7.6 G/DL (ref 6.1–8.3)
MICROALBUMIN UR-MCNC: 1.13 MG/DL
MICROALBUMIN/CREAT 24H UR-RTO: 12.4 UG/MG (ref ?–30)
NONHDLC SERPL-MCNC: 149 MG/DL (ref ?–130)
OSMOLALITY SERPL CALC.SUM OF ELEC: 299 MOSM/KG (ref 275–295)
POTASSIUM SERPL-SCNC: 4.7 MMOL/L (ref 3.6–5.1)
SODIUM SERPL-SCNC: 143 MMOL/L (ref 136–144)
TRIGL SERPL-MCNC: 90 MG/DL (ref 30–149)
VLDLC SERPL CALC-MCNC: 18 MG/DL (ref 0–30)

## 2018-08-08 PROCEDURE — 82043 UR ALBUMIN QUANTITATIVE: CPT

## 2018-08-08 PROCEDURE — 83036 HEMOGLOBIN GLYCOSYLATED A1C: CPT

## 2018-08-08 PROCEDURE — 36415 COLL VENOUS BLD VENIPUNCTURE: CPT

## 2018-08-08 PROCEDURE — 80053 COMPREHEN METABOLIC PANEL: CPT

## 2018-08-08 PROCEDURE — 82570 ASSAY OF URINE CREATININE: CPT

## 2018-08-08 PROCEDURE — 80061 LIPID PANEL: CPT

## 2018-08-13 NOTE — PROGRESS NOTES
Pt's problem list DM dx changed to Type 2 DM without complication without use of insulin.  THere is a duplicate dx of DM with hypoglycemia in this date's original visit note without documentation, please see reference to the earlier DM diagnosis and addend

## 2018-08-14 ENCOUNTER — TELEPHONE (OUTPATIENT)
Dept: OBGYN CLINIC | Facility: CLINIC | Age: 73
End: 2018-08-14

## 2018-08-14 ENCOUNTER — MED REC SCAN ONLY (OUTPATIENT)
Dept: FAMILY MEDICINE CLINIC | Facility: CLINIC | Age: 73
End: 2018-08-14

## 2018-10-25 PROCEDURE — 82274 ASSAY TEST FOR BLOOD FECAL: CPT

## 2018-10-26 ENCOUNTER — APPOINTMENT (OUTPATIENT)
Dept: LAB | Age: 73
End: 2018-10-26
Attending: FAMILY MEDICINE
Payer: MEDICARE

## 2018-10-26 DIAGNOSIS — Z12.12 ENCOUNTER FOR COLORECTAL CANCER SCREENING: ICD-10-CM

## 2018-10-26 DIAGNOSIS — Z12.11 ENCOUNTER FOR COLORECTAL CANCER SCREENING: ICD-10-CM

## 2018-11-07 ENCOUNTER — TELEPHONE (OUTPATIENT)
Dept: FAMILY MEDICINE CLINIC | Facility: CLINIC | Age: 73
End: 2018-11-07

## 2018-11-07 DIAGNOSIS — Z17.0 MALIGNANT NEOPLASM OF UPPER-OUTER QUADRANT OF RIGHT BREAST IN FEMALE, ESTROGEN RECEPTOR POSITIVE (HCC): ICD-10-CM

## 2018-11-07 DIAGNOSIS — E78.2 MIXED HYPERLIPIDEMIA: ICD-10-CM

## 2018-11-07 DIAGNOSIS — Z11.59 ENCOUNTER FOR HEPATITIS C SCREENING TEST FOR LOW RISK PATIENT: ICD-10-CM

## 2018-11-07 DIAGNOSIS — C50.411 MALIGNANT NEOPLASM OF UPPER-OUTER QUADRANT OF RIGHT BREAST IN FEMALE, ESTROGEN RECEPTOR POSITIVE (HCC): ICD-10-CM

## 2018-11-07 DIAGNOSIS — C55 MALIGNANT NEOPLASM OF UTERUS, UNSPECIFIED SITE (HCC): Primary | ICD-10-CM

## 2018-11-07 DIAGNOSIS — E11.9 TYPE 2 DIABETES MELLITUS WITHOUT COMPLICATION, WITHOUT LONG-TERM CURRENT USE OF INSULIN (HCC): ICD-10-CM

## 2018-11-07 NOTE — TELEPHONE ENCOUNTER
Patient left voicemail requesting updated referrals for Dr Isabella Perez and Dr Krystian Aleman, also stated she needs to have orders for blood work done?  Please call pt back

## 2018-11-08 NOTE — TELEPHONE ENCOUNTER
Patient called and advised of orders placed for referrals and labs. Reminded to be fasting for lab draw. Also asking for annual mammogram order to be placed. Ordered by Dr. Violeta Arauz last year. Last Mammogram  11/15/17.   Advised to call Dr. Violeta Arauz fo

## 2018-11-08 NOTE — TELEPHONE ENCOUNTER
Called patient to clarify requested referrals. Orders placed for updated referral to Dr Rama Leary for follow-up post hysterectomy. Updated referral placed for Dr. Meryl Hopson for follow up post breast surgery.     Future Appointments   Date Time Provider Departm

## 2018-11-28 ENCOUNTER — APPOINTMENT (OUTPATIENT)
Dept: LAB | Age: 73
End: 2018-11-28
Attending: SURGERY
Payer: MEDICARE

## 2018-11-28 ENCOUNTER — HOSPITAL ENCOUNTER (OUTPATIENT)
Dept: MAMMOGRAPHY | Age: 73
Discharge: HOME | End: 2018-11-28
Attending: SURGERY
Payer: MEDICARE

## 2018-11-28 DIAGNOSIS — Z17.0 MALIGNANT NEOPLASM OF UPPER-OUTER QUADRANT OF RIGHT BREAST IN FEMALE, ESTROGEN RECEPTOR POSITIVE (HCC): ICD-10-CM

## 2018-11-28 DIAGNOSIS — E11.9 TYPE 2 DIABETES MELLITUS WITHOUT COMPLICATION, WITHOUT LONG-TERM CURRENT USE OF INSULIN (HCC): ICD-10-CM

## 2018-11-28 DIAGNOSIS — C50.411 MALIGNANT NEOPLASM OF UPPER-OUTER QUADRANT OF RIGHT BREAST IN FEMALE, ESTROGEN RECEPTOR POSITIVE (HCC): ICD-10-CM

## 2018-11-28 DIAGNOSIS — C55 MALIGNANT NEOPLASM OF UTERUS, UNSPECIFIED SITE (HCC): ICD-10-CM

## 2018-11-28 DIAGNOSIS — Z11.59 ENCOUNTER FOR HEPATITIS C SCREENING TEST FOR LOW RISK PATIENT: ICD-10-CM

## 2018-11-28 DIAGNOSIS — E78.2 MIXED HYPERLIPIDEMIA: ICD-10-CM

## 2018-11-28 PROCEDURE — 83036 HEMOGLOBIN GLYCOSYLATED A1C: CPT

## 2018-11-28 PROCEDURE — 36415 COLL VENOUS BLD VENIPUNCTURE: CPT

## 2018-11-28 PROCEDURE — 80053 COMPREHEN METABOLIC PANEL: CPT

## 2018-11-28 PROCEDURE — 82043 UR ALBUMIN QUANTITATIVE: CPT

## 2018-11-28 PROCEDURE — 82570 ASSAY OF URINE CREATININE: CPT

## 2018-11-28 PROCEDURE — 77066 DX MAMMO INCL CAD BI: CPT | Performed by: SURGERY

## 2018-11-28 PROCEDURE — 86803 HEPATITIS C AB TEST: CPT

## 2018-11-28 PROCEDURE — 77062 BREAST TOMOSYNTHESIS BI: CPT | Performed by: SURGERY

## 2018-11-28 PROCEDURE — 80061 LIPID PANEL: CPT

## 2018-12-05 ENCOUNTER — OFFICE VISIT (OUTPATIENT)
Dept: FAMILY MEDICINE CLINIC | Facility: CLINIC | Age: 73
End: 2018-12-05

## 2018-12-05 VITALS
DIASTOLIC BLOOD PRESSURE: 72 MMHG | WEIGHT: 145.38 LBS | HEART RATE: 65 BPM | HEIGHT: 61.25 IN | TEMPERATURE: 98 F | OXYGEN SATURATION: 99 % | SYSTOLIC BLOOD PRESSURE: 120 MMHG | RESPIRATION RATE: 16 BRPM | BODY MASS INDEX: 27.1 KG/M2

## 2018-12-05 DIAGNOSIS — E78.2 MIXED HYPERLIPIDEMIA: ICD-10-CM

## 2018-12-05 DIAGNOSIS — E11.9 CONTROLLED TYPE 2 DIABETES MELLITUS WITHOUT COMPLICATION, WITHOUT LONG-TERM CURRENT USE OF INSULIN (HCC): Primary | ICD-10-CM

## 2018-12-05 PROCEDURE — 99214 OFFICE O/P EST MOD 30 MIN: CPT | Performed by: FAMILY MEDICINE

## 2018-12-05 NOTE — PATIENT INSTRUCTIONS
Recheck lab about March, then will assume diabetes in remission if labs still in normal range. Schedule also then for well assessment (encouraged early in year).

## 2018-12-05 NOTE — PROGRESS NOTES
Elio Nazario IS A 72year old female [address] [facility] Patient presents with:  Lab Results       History of present illness:     Not on pravastatin or BP med in > 1 year. 3 miles/day, did 5 5k races this year, came in first in her age bracket most recently.  Azul Jacqueline Terrell MD at [facility] MAIN OR   • LUMPECTOMY RIGHT Right 04/2017    Invasive ductal   • ROBOT-ASSISTED LAPAROSCOPIC HYSTERECTOMY Bilateral 2/20/2018    Performed by Gabriela Stallworth MD at [address]   • TOTAL KNEE REPLACEMENT Left 2009   • TOTAL KNEE REPLA file      Transportation needs - non-medical: Not on file    Occupational History      Occupation: retired, worked with [employer]    Tobacco Use      Smoking status: Former Smoker        Packs/day: 0.20        Years: 1.00        Pack years: . 2 11/28/2018 17    • Creatinine 11/28/2018 0.85    • BUN/CREA Ratio 11/28/2018 20.0    • Calcium, Total 11/28/2018 9.3    • Calculated Osmolality 11/28/2018 291    • GFR, Non- 11/28/2018 69    • GFR, -American 11/28/2018 80    • AST 11 08/08/2018 64*   • Triglycerides 08/08/2018 90    • LDL Cholesterol 08/08/2018 131*   • VLDL 08/08/2018 18    • Non HDL Chol 08/08/2018 149*   • HgbA1C 08/08/2018 5.3    • Estimated Average Glucose 08/08/2018 105    • Microalbumin, Urine 08/08/2018 1.13

## 2019-01-14 ENCOUNTER — TELEPHONE (OUTPATIENT)
Dept: OBGYN CLINIC | Facility: CLINIC | Age: 74
End: 2019-01-14

## 2019-01-15 ENCOUNTER — MED REC SCAN ONLY (OUTPATIENT)
Dept: FAMILY MEDICINE CLINIC | Facility: CLINIC | Age: 74
End: 2019-01-15

## 2019-03-05 ENCOUNTER — PATIENT OUTREACH (OUTPATIENT)
Dept: FAMILY MEDICINE CLINIC | Facility: CLINIC | Age: 74
End: 2019-03-05

## 2019-09-16 ENCOUNTER — PATIENT OUTREACH (OUTPATIENT)
Dept: CASE MANAGEMENT | Age: 74
End: 2019-09-16

## 2019-09-16 NOTE — PROGRESS NOTES
Spoke with Steve Elizalde explained in detail about CCM patient declined at this time agreed to have information letter sent so she has information for future reference. Assisted with scheduling a appointment for patient.     Patient identified with a potential ne

## 2019-09-20 ENCOUNTER — APPOINTMENT (OUTPATIENT)
Dept: LAB | Age: 74
End: 2019-09-20
Attending: FAMILY MEDICINE
Payer: MEDICARE

## 2019-09-20 DIAGNOSIS — E78.2 MIXED HYPERLIPIDEMIA: ICD-10-CM

## 2019-09-20 DIAGNOSIS — E11.9 CONTROLLED TYPE 2 DIABETES MELLITUS WITHOUT COMPLICATION, WITHOUT LONG-TERM CURRENT USE OF INSULIN (HCC): ICD-10-CM

## 2019-09-20 LAB
ALBUMIN SERPL-MCNC: 4 G/DL (ref 3.4–5)
ALBUMIN/GLOB SERPL: 1.1 {RATIO} (ref 1–2)
ALP LIVER SERPL-CCNC: 74 U/L (ref 55–142)
ALT SERPL-CCNC: 22 U/L (ref 13–56)
ANION GAP SERPL CALC-SCNC: 5 MMOL/L (ref 0–18)
AST SERPL-CCNC: 24 U/L (ref 15–37)
BILIRUB SERPL-MCNC: 1.3 MG/DL (ref 0.1–2)
BUN BLD-MCNC: 21 MG/DL (ref 7–18)
BUN/CREAT SERPL: 21 (ref 10–20)
CALCIUM BLD-MCNC: 9.5 MG/DL (ref 8.5–10.1)
CHLORIDE SERPL-SCNC: 108 MMOL/L (ref 98–112)
CHOLEST SMN-MCNC: 217 MG/DL (ref ?–200)
CO2 SERPL-SCNC: 27 MMOL/L (ref 21–32)
CREAT BLD-MCNC: 1 MG/DL (ref 0.55–1.02)
CREAT UR-SCNC: 154 MG/DL
EST. AVERAGE GLUCOSE BLD GHB EST-MCNC: 111 MG/DL (ref 68–126)
GLOBULIN PLAS-MCNC: 3.6 G/DL (ref 2.8–4.4)
GLUCOSE BLD-MCNC: 87 MG/DL (ref 70–99)
HBA1C MFR BLD HPLC: 5.5 % (ref ?–5.7)
HDLC SERPL-MCNC: 63 MG/DL (ref 40–59)
LDLC SERPL CALC-MCNC: 129 MG/DL (ref ?–100)
M PROTEIN MFR SERPL ELPH: 7.6 G/DL (ref 6.4–8.2)
MICROALBUMIN UR-MCNC: 2.66 MG/DL
MICROALBUMIN/CREAT 24H UR-RTO: 17.3 UG/MG (ref ?–30)
NONHDLC SERPL-MCNC: 154 MG/DL (ref ?–130)
OSMOLALITY SERPL CALC.SUM OF ELEC: 292 MOSM/KG (ref 275–295)
POTASSIUM SERPL-SCNC: 4.4 MMOL/L (ref 3.5–5.1)
SODIUM SERPL-SCNC: 140 MMOL/L (ref 136–145)
TRIGL SERPL-MCNC: 126 MG/DL (ref 30–149)
VLDLC SERPL CALC-MCNC: 25 MG/DL (ref 0–30)

## 2019-09-20 PROCEDURE — 36415 COLL VENOUS BLD VENIPUNCTURE: CPT

## 2019-09-20 PROCEDURE — 83036 HEMOGLOBIN GLYCOSYLATED A1C: CPT

## 2019-09-20 PROCEDURE — 82570 ASSAY OF URINE CREATININE: CPT

## 2019-09-20 PROCEDURE — 82043 UR ALBUMIN QUANTITATIVE: CPT

## 2019-09-20 PROCEDURE — 80061 LIPID PANEL: CPT

## 2019-09-20 PROCEDURE — 80053 COMPREHEN METABOLIC PANEL: CPT

## 2019-09-24 ENCOUNTER — OFFICE VISIT (OUTPATIENT)
Dept: FAMILY MEDICINE CLINIC | Facility: CLINIC | Age: 74
End: 2019-09-24
Payer: MEDICARE

## 2019-09-24 VITALS
SYSTOLIC BLOOD PRESSURE: 108 MMHG | DIASTOLIC BLOOD PRESSURE: 70 MMHG | BODY MASS INDEX: 27.56 KG/M2 | WEIGHT: 146 LBS | RESPIRATION RATE: 16 BRPM | OXYGEN SATURATION: 98 % | HEART RATE: 61 BPM | HEIGHT: 61 IN | TEMPERATURE: 97 F

## 2019-09-24 DIAGNOSIS — M81.0 AGE-RELATED OSTEOPOROSIS WITHOUT CURRENT PATHOLOGICAL FRACTURE: ICD-10-CM

## 2019-09-24 DIAGNOSIS — Z78.0 POSTMENOPAUSAL: Primary | ICD-10-CM

## 2019-09-24 DIAGNOSIS — M75.81 ROTATOR CUFF TENDINITIS, RIGHT: ICD-10-CM

## 2019-09-24 PROBLEM — K76.0 FATTY LIVER: Status: RESOLVED | Noted: 2017-04-05 | Resolved: 2019-09-24

## 2019-09-24 PROBLEM — Z96.653 STATUS POST TOTAL BILATERAL KNEE REPLACEMENT: Status: ACTIVE | Noted: 2019-09-24

## 2019-09-24 PROCEDURE — 99214 OFFICE O/P EST MOD 30 MIN: CPT | Performed by: FAMILY MEDICINE

## 2019-09-24 NOTE — PROGRESS NOTES
Brady Vázquez IS A 72year old female [address] [place] Patient presents with:  Lab Results: Denied flu shot       History of present illness:     No processed, no grain diet, cooks a lot of veggies. Fruit, meat 3x/week, beans, eggs.      As of today, is 2 years w Kwadwo North MD at [facility] MAIN OR   • LAPAROSCOPIC CHOLECYSTECTOMY N/A 3/17/2015    Performed by Shakila Tang MD at [facility] MAIN OR   • LUMPECTOMY RIGHT Right 04/2017    Invasive ductal   • ROBOT-ASSISTED LAPAROSCOPIC HYSTERECTOMY Bilateral 2/20/2018    Performed by Medical: Not on file        Non-medical: Not on file    Tobacco Use      Smoking status: Former Smoker        Packs/day: 0.20        Years: 1.00        Pack years: .2        Quit date: 1968        Years since quittin.7      Smokeless tobacco: Ne Temp 97.4 °F (36.3 °C) (Oral)   Resp 16   Ht 61\"   Wt 146 lb   SpO2 98%   BMI 27.59 kg/m²      Lungs clear   Heart regular rhythm without murmur S3 S4   Abdomen well healed hyst scars, nontender no mass or distention or organomegaly  Extremities pulses n [facility], consider oncology followup as well. No antibiotics needed before dental work. See us in 3-4 months for early year Medicare well visit. Let us know if you need ophthalmology referral if cataracts worsen.

## 2019-09-24 NOTE — PATIENT INSTRUCTIONS
You are no longer considered diabetic. Continue healthy lifestyle, mammogram ordered by Dr. Seferino Franco, consider oncology followup as well. No antibiotics needed before dental work. See us in 3-4 months for early year Medicare well visit.      Let us

## 2019-09-26 ENCOUNTER — HOSPITAL ENCOUNTER (OUTPATIENT)
Dept: BONE DENSITY | Age: 74
Discharge: HOME | End: 2019-09-26
Attending: FAMILY MEDICINE
Payer: MEDICARE

## 2019-09-26 DIAGNOSIS — M81.0 AGE-RELATED OSTEOPOROSIS WITHOUT CURRENT PATHOLOGICAL FRACTURE: ICD-10-CM

## 2019-09-26 DIAGNOSIS — Z78.0 POSTMENOPAUSAL: ICD-10-CM

## 2019-09-26 PROCEDURE — 77080 DXA BONE DENSITY AXIAL: CPT | Performed by: FAMILY MEDICINE

## 2019-10-01 ENCOUNTER — OFFICE VISIT (OUTPATIENT)
Dept: PHYSICAL THERAPY | Age: 74
End: 2019-10-01
Attending: FAMILY MEDICINE
Payer: MEDICARE

## 2019-10-01 DIAGNOSIS — M75.81 ROTATOR CUFF TENDINITIS, RIGHT: ICD-10-CM

## 2019-10-01 PROCEDURE — 97530 THERAPEUTIC ACTIVITIES: CPT

## 2019-10-01 PROCEDURE — 97161 PT EVAL LOW COMPLEX 20 MIN: CPT

## 2019-10-01 NOTE — PROGRESS NOTES
UPPER EXTREMITY EVALUATION:   Referring Physician: Dr. Chiquis Maravilla  Diagnosis: Right Rotator Cuff Tendinitis     Date of Service: 10/1/2019     PATIENT SUMMARY   Dai Bliss is a 72year old y/o right hand dominant female who presents to therapy today with L 80  IR: R 30; L 45       Accessory motion: The patient presents with moderate restrictions of the posterior and inferior glide at the glenoumneral joint(s). Flexibility: Deferred due to capsular restrictions.       Strength/MMT:  Shoulder Elbow Scapu Therapeutic Activity; Electrical Stim; Ultrasound; Pt education; Home exercise program instructions.       Education or treatment limitation: None  Rehab Potential:good    FOTO: 58/100    Patient/Family/Caregiver was advised of these findings, precautions,

## 2019-10-08 ENCOUNTER — OFFICE VISIT (OUTPATIENT)
Dept: PHYSICAL THERAPY | Age: 74
End: 2019-10-08
Attending: FAMILY MEDICINE
Payer: MEDICARE

## 2019-10-08 PROCEDURE — 97140 MANUAL THERAPY 1/> REGIONS: CPT

## 2019-10-08 PROCEDURE — 97112 NEUROMUSCULAR REEDUCATION: CPT

## 2019-10-08 PROCEDURE — 97110 THERAPEUTIC EXERCISES: CPT

## 2019-10-08 NOTE — PROGRESS NOTES
Dx: Right Rotator Cuff Tendinitis         Authorized # of Visits:  8 (HMO)         Next MD visit: none scheduled  Fall Risk: standard         Precautions: n/a             Subjective: States that the shoulder is a little better.   Some soreness today     Obj

## 2019-10-11 ENCOUNTER — OFFICE VISIT (OUTPATIENT)
Dept: PHYSICAL THERAPY | Age: 74
End: 2019-10-11
Attending: FAMILY MEDICINE
Payer: MEDICARE

## 2019-10-11 PROCEDURE — 97140 MANUAL THERAPY 1/> REGIONS: CPT

## 2019-10-11 PROCEDURE — 97110 THERAPEUTIC EXERCISES: CPT

## 2019-10-11 NOTE — PROGRESS NOTES
Dx: Right Rotator Cuff Tendinitis         Authorized # of Visits:  8 (HMO)         Next MD visit: none scheduled  Fall Risk: standard         Precautions: n/a             Subjective: States that the shoulder is a little better.   Soreness was not as bad as

## 2019-10-21 ENCOUNTER — OFFICE VISIT (OUTPATIENT)
Dept: PHYSICAL THERAPY | Age: 74
End: 2019-10-21
Attending: FAMILY MEDICINE
Payer: MEDICARE

## 2019-10-21 PROCEDURE — 97110 THERAPEUTIC EXERCISES: CPT

## 2019-10-21 PROCEDURE — 97140 MANUAL THERAPY 1/> REGIONS: CPT

## 2019-10-21 NOTE — PROGRESS NOTES
Dx: Right Rotator Cuff Tendinitis         Authorized # of Visits:  8 (O)         Next MD visit: none scheduled  Fall Risk: standard         Precautions: n/a             Subjective: States that the shoulder is a little more sore as she went bowling this w piano.    4. Patient will have an increase in strength for the upper extremity to assist with returning to exercise and playing the piano    Plan: Assess response and progress as tolerated. Charges: TherEx 2 (25 min); Manual PT 1 (10 min);  Neuro Tracey

## 2019-10-23 ENCOUNTER — OFFICE VISIT (OUTPATIENT)
Dept: PHYSICAL THERAPY | Age: 74
End: 2019-10-23
Attending: FAMILY MEDICINE
Payer: MEDICARE

## 2019-10-23 PROCEDURE — 97112 NEUROMUSCULAR REEDUCATION: CPT

## 2019-10-23 PROCEDURE — 97140 MANUAL THERAPY 1/> REGIONS: CPT

## 2019-10-23 PROCEDURE — 97110 THERAPEUTIC EXERCISES: CPT

## 2019-10-23 NOTE — PROGRESS NOTES
Dx: Right Rotator Cuff Tendinitis         Authorized # of Visits:  8 (O)         Next MD visit: none scheduled  Fall Risk: standard         Precautions: n/a             Subjective: States that the shoulder is a bit more sore because she has been doing mo performing push/pull activities with functional assessment, including vacuuming and scrubbing. 3. Patient will have an increase in glenohumeral mobility to normal in order to return to overhead motions and sustained postuting with playing piano. 4.  P

## 2019-10-29 ENCOUNTER — OFFICE VISIT (OUTPATIENT)
Dept: PHYSICAL THERAPY | Age: 74
End: 2019-10-29
Attending: FAMILY MEDICINE
Payer: MEDICARE

## 2019-10-29 PROCEDURE — 97140 MANUAL THERAPY 1/> REGIONS: CPT

## 2019-10-29 PROCEDURE — 97110 THERAPEUTIC EXERCISES: CPT

## 2019-10-29 NOTE — PROGRESS NOTES
Dx: Right Rotator Cuff Tendinitis         Authorized # of Visits:  8 (O)         Next MD visit: none scheduled  Fall Risk: standard         Precautions: n/a             Subjective: States that the shoulder has been good.   States that she has been activel inferior GHJ glides Posterior and inferior GHJ glides Posterior and inferior GHJ glides         Assessment: Responded well. Increased motion at the shoulder for all planes of motion. Goals (to be met in 12 visits):    1.  Increase FOTO assessment > 1

## 2019-11-01 ENCOUNTER — OFFICE VISIT (OUTPATIENT)
Dept: PHYSICAL THERAPY | Age: 74
End: 2019-11-01
Attending: FAMILY MEDICINE
Payer: MEDICARE

## 2019-11-01 PROCEDURE — 97140 MANUAL THERAPY 1/> REGIONS: CPT

## 2019-11-01 PROCEDURE — 97110 THERAPEUTIC EXERCISES: CPT

## 2019-11-01 PROCEDURE — 97112 NEUROMUSCULAR REEDUCATION: CPT

## 2019-11-01 NOTE — PROGRESS NOTES
Dx: Right Rotator Cuff Tendinitis         Authorized # of Visits:  8 (HMO)         Next MD visit: none scheduled  Fall Risk: standard         Precautions: n/a             Subjective: States that the shoulder has been good. No new complaints.       Objectiv reps at 30/60/90/120 for FE and CAPRI and 30/60 for IR/ER    Manual PT (10 min) Manual PT (10 min) Manual PT (10 min) Manual PT (10 min) Manual PT (10 min) Manual PT (10 min)    Posterior and inferior GHJ glides Posterior and inferior GHJ glides Posterior a

## 2019-11-04 ENCOUNTER — WALK IN (OUTPATIENT)
Dept: URGENT CARE | Age: 74
End: 2019-11-04

## 2019-11-04 VITALS
RESPIRATION RATE: 20 BRPM | HEIGHT: 61 IN | HEART RATE: 76 BPM | OXYGEN SATURATION: 98 % | SYSTOLIC BLOOD PRESSURE: 140 MMHG | WEIGHT: 145 LBS | TEMPERATURE: 98.3 F | BODY MASS INDEX: 27.38 KG/M2 | DIASTOLIC BLOOD PRESSURE: 76 MMHG

## 2019-11-04 DIAGNOSIS — J00 ACUTE NASOPHARYNGITIS: Primary | ICD-10-CM

## 2019-11-04 PROCEDURE — 99203 OFFICE O/P NEW LOW 30 MIN: CPT | Performed by: NURSE PRACTITIONER

## 2019-11-04 RX ORDER — BENZONATATE 200 MG/1
200 CAPSULE ORAL 3 TIMES DAILY PRN
Qty: 20 CAPSULE | Refills: 0 | Status: SHIPPED | OUTPATIENT
Start: 2019-11-04

## 2019-11-04 RX ORDER — DEXTROMETHORPHAN HYDROBROMIDE AND PROMETHAZINE HYDROCHLORIDE 15; 6.25 MG/5ML; MG/5ML
5 SYRUP ORAL
Qty: 118 ML | Refills: 0 | Status: SHIPPED | OUTPATIENT
Start: 2019-11-04

## 2019-11-04 SDOH — HEALTH STABILITY: MENTAL HEALTH: HOW OFTEN DO YOU HAVE A DRINK CONTAINING ALCOHOL?: NEVER

## 2019-11-04 SDOH — HEALTH STABILITY: PHYSICAL HEALTH: ON AVERAGE, HOW MANY DAYS PER WEEK DO YOU ENGAGE IN MODERATE TO STRENUOUS EXERCISE (LIKE A BRISK WALK)?: 6 DAYS

## 2019-11-04 ASSESSMENT — ENCOUNTER SYMPTOMS
ALLERGIC/IMMUNOLOGIC NEGATIVE: 1
CONSTITUTIONAL NEGATIVE: 1
COUGH: 1
WHEEZING: 0
GASTROINTESTINAL NEGATIVE: 1
NEUROLOGICAL NEGATIVE: 1
SHORTNESS OF BREATH: 0
EYES NEGATIVE: 1

## 2019-12-04 ENCOUNTER — HOSPITAL ENCOUNTER (OUTPATIENT)
Dept: MAMMOGRAPHY | Age: 74
Discharge: HOME | End: 2019-12-04
Attending: SURGERY
Payer: MEDICARE

## 2019-12-04 DIAGNOSIS — Z12.31 ENCOUNTER FOR SCREENING MAMMOGRAM FOR MALIGNANT NEOPLASM OF BREAST: ICD-10-CM

## 2019-12-04 PROCEDURE — 77067 SCR MAMMO BI INCL CAD: CPT | Performed by: SURGERY

## 2019-12-04 PROCEDURE — 77063 BREAST TOMOSYNTHESIS BI: CPT | Performed by: SURGERY

## 2020-04-27 ENCOUNTER — TELEPHONE (OUTPATIENT)
Dept: FAMILY MEDICINE CLINIC | Facility: CLINIC | Age: 75
End: 2020-04-27

## 2020-04-27 NOTE — TELEPHONE ENCOUNTER
Spoke to pt - was saying Dr. Albaro Cisneros mentioned to pt that she is no longer considered Diabetic. Pt is not taking any DM medication. Diabetes registry removal request submitted.

## 2020-12-22 ENCOUNTER — MED REC SCAN ONLY (OUTPATIENT)
Dept: FAMILY MEDICINE CLINIC | Facility: CLINIC | Age: 75
End: 2020-12-22

## 2020-12-23 ENCOUNTER — HOSPITAL ENCOUNTER (OUTPATIENT)
Dept: MAMMOGRAPHY | Age: 75
Discharge: HOME | End: 2020-12-23
Attending: SURGERY
Payer: MEDICARE

## 2020-12-23 DIAGNOSIS — Z12.31 ENCOUNTER FOR SCREENING MAMMOGRAM FOR MALIGNANT NEOPLASM OF BREAST: ICD-10-CM

## 2020-12-23 PROCEDURE — 77067 SCR MAMMO BI INCL CAD: CPT | Performed by: SURGERY

## 2020-12-23 PROCEDURE — 77063 BREAST TOMOSYNTHESIS BI: CPT | Performed by: SURGERY

## 2021-01-28 ENCOUNTER — TELEPHONE (OUTPATIENT)
Dept: FAMILY MEDICINE CLINIC | Facility: CLINIC | Age: 76
End: 2021-01-28

## 2021-02-02 DIAGNOSIS — Z23 NEED FOR VACCINATION: ICD-10-CM

## 2021-02-03 ENCOUNTER — OFFICE VISIT (OUTPATIENT)
Dept: FAMILY MEDICINE CLINIC | Facility: CLINIC | Age: 76
End: 2021-02-03
Payer: MEDICARE

## 2021-02-03 VITALS
WEIGHT: 149 LBS | TEMPERATURE: 98 F | RESPIRATION RATE: 16 BRPM | HEART RATE: 84 BPM | HEIGHT: 61 IN | BODY MASS INDEX: 28.13 KG/M2 | OXYGEN SATURATION: 99 % | SYSTOLIC BLOOD PRESSURE: 134 MMHG | DIASTOLIC BLOOD PRESSURE: 80 MMHG

## 2021-02-03 DIAGNOSIS — C50.411 MALIGNANT NEOPLASM OF UPPER-OUTER QUADRANT OF RIGHT BREAST IN FEMALE, ESTROGEN RECEPTOR POSITIVE (HCC): ICD-10-CM

## 2021-02-03 DIAGNOSIS — Z00.00 ENCOUNTER FOR ANNUAL HEALTH EXAMINATION: Primary | ICD-10-CM

## 2021-02-03 DIAGNOSIS — M81.0 AGE-RELATED OSTEOPOROSIS WITHOUT CURRENT PATHOLOGICAL FRACTURE: ICD-10-CM

## 2021-02-03 DIAGNOSIS — Z12.11 SCREEN FOR COLON CANCER: ICD-10-CM

## 2021-02-03 DIAGNOSIS — R42 VERTIGO: ICD-10-CM

## 2021-02-03 DIAGNOSIS — C55 MALIGNANT NEOPLASM OF UTERUS, UNSPECIFIED SITE (HCC): ICD-10-CM

## 2021-02-03 DIAGNOSIS — E78.2 MIXED HYPERLIPIDEMIA: ICD-10-CM

## 2021-02-03 DIAGNOSIS — E11.649 TYPE 2 DIABETES MELLITUS WITH HYPOGLYCEMIA WITHOUT COMA, WITHOUT LONG-TERM CURRENT USE OF INSULIN (HCC): ICD-10-CM

## 2021-02-03 DIAGNOSIS — R73.01 IFG (IMPAIRED FASTING GLUCOSE): ICD-10-CM

## 2021-02-03 DIAGNOSIS — Z17.0 MALIGNANT NEOPLASM OF UPPER-OUTER QUADRANT OF RIGHT BREAST IN FEMALE, ESTROGEN RECEPTOR POSITIVE (HCC): ICD-10-CM

## 2021-02-03 DIAGNOSIS — Z78.0 POSTMENOPAUSAL: ICD-10-CM

## 2021-02-03 DIAGNOSIS — M85.80 OSTEOPENIA, UNSPECIFIED LOCATION: ICD-10-CM

## 2021-02-03 DIAGNOSIS — H26.9 CATARACT OF BOTH EYES, UNSPECIFIED CATARACT TYPE: ICD-10-CM

## 2021-02-03 PROCEDURE — 96160 PT-FOCUSED HLTH RISK ASSMT: CPT | Performed by: FAMILY MEDICINE

## 2021-02-03 PROCEDURE — 99397 PER PM REEVAL EST PAT 65+ YR: CPT | Performed by: FAMILY MEDICINE

## 2021-02-03 PROCEDURE — 3008F BODY MASS INDEX DOCD: CPT | Performed by: FAMILY MEDICINE

## 2021-02-03 PROCEDURE — 3075F SYST BP GE 130 - 139MM HG: CPT | Performed by: FAMILY MEDICINE

## 2021-02-03 PROCEDURE — G0439 PPPS, SUBSEQ VISIT: HCPCS | Performed by: FAMILY MEDICINE

## 2021-02-03 PROCEDURE — 3079F DIAST BP 80-89 MM HG: CPT | Performed by: FAMILY MEDICINE

## 2021-02-03 RX ORDER — MAGNESIUM GLYCINATE 100 MG
CAPSULE ORAL
COMMUNITY
Start: 2021-01-01

## 2021-02-03 NOTE — PROGRESS NOTES
HPI:   Jenny Hawkins is a 70year old female who presents for a MA (Medicare Advantage) Supervisit (Once per calendar year).     Here for well exam.  Her last annual assessment has been over 1 year: Annual Physical due on 08/01/2019       Did 3 virtual ra but quit greater than 12 months ago.   Social History    Tobacco Use      Smoking status: Former Smoker        Packs/day: 0.20        Years: 1.00        Pack years: .2        Quit date: 1968        Years since quittin.0      Smokeless tobacco: Nev 09/20/2019        CBC  (most recent labs)   Lab Results   Component Value Date    WBC 6.6 02/21/2018    HGB 12.5 02/21/2018    .0 02/21/2018        ALLERGIES:   She is allergic to morphine sulfate; penicillin v potassium; sulfa antibiotics; sulfonyl that she quit smoking about 53 years ago. She has a 0.20 pack-year smoking history. She has never used smokeless tobacco. She reports that she does not drink alcohol or use drugs. REVIEW OF SYSTEMS:      Negative except HPI.      EXAM:   /80   Pul Neurologic: Normal    and Breasts: defer to Dr. Angela Orellana    Vaccination History     Immunization History   Administered Date(s) Administered   • FLU VAC High Dose 65 YRS & Older PRSV Free (86015) 10/15/2016   • Fluvirin, 3 Years & >, Im 11/16/2012   • Flu maintain a good energy level?: Appropriate Exercise;Daily Walks  How would you describe your daily physical activity?: Moderate  How would you describe your current health state?: Good  How do you maintain positive mental well-being?: Social Interaction;Vi care reminders to display for this patient. Update Health Maintenance if applicable    Chlamydia  Annually if high risk No results found for: CHLAMYDIA No flowsheet data found.     Screening Mammogram      Mammogram Annually to 78, then as discussed Mammogr

## 2021-02-03 NOTE — PATIENT INSTRUCTIONS
Cologuard application we will send, would need every 3 years. Please complete and upload a copy of advance directives, links below. Labs soon for cholesterol and sugar, liver, kidneys. Consider shingles vaccine. Covid vaccine recommended. non-screening if indicated for medical reasons Electrocardiogram date Routine EKG is not a screening covered service except at the [place] to Medicare Visit    Abdominal aortic aneurysm screening (once between ages 73-68) IPPE only No results found for Tonia Mcgovern 21-65 or Pap+HPV every 5 yrs age 33-67, Covered every 2 yrs up to age 79 or Yearly if High Risk   There are no preventive care reminders to display for this patient.      Chlamydia  Annually if high risk No results found for: CHLAMYDIA No flowsheet data fou Health. This site has a lot of good information including definitions of the different types of Advance Directives.  It also has the State forms available on it's website for anyone to review and print using their home computer and printer. (the forms are a

## 2021-04-08 ENCOUNTER — TELEPHONE (OUTPATIENT)
Dept: FAMILY MEDICINE CLINIC | Facility: CLINIC | Age: 76
End: 2021-04-08

## 2021-12-27 ENCOUNTER — HOSPITAL ENCOUNTER (OUTPATIENT)
Dept: MAMMOGRAPHY | Age: 76
Discharge: HOME | End: 2021-12-27
Attending: SURGERY
Payer: MEDICARE

## 2021-12-27 DIAGNOSIS — Z12.31 ENCOUNTER FOR SCREENING MAMMOGRAM FOR MALIGNANT NEOPLASM OF BREAST: ICD-10-CM

## 2021-12-27 PROCEDURE — 77067 SCR MAMMO BI INCL CAD: CPT | Performed by: SURGERY

## 2021-12-27 PROCEDURE — 77063 BREAST TOMOSYNTHESIS BI: CPT | Performed by: SURGERY

## 2022-01-10 ENCOUNTER — HOSPITAL ENCOUNTER (OUTPATIENT)
Dept: MAMMOGRAPHY | Age: 77
Discharge: HOME | End: 2022-01-10
Attending: SURGERY
Payer: MEDICARE

## 2022-01-10 ENCOUNTER — HOSPITAL ENCOUNTER (OUTPATIENT)
Dept: ULTRASOUND IMAGING | Age: 77
Discharge: HOME | End: 2022-01-10
Attending: SURGERY
Payer: MEDICARE

## 2022-01-10 DIAGNOSIS — R92.2 INCONCLUSIVE MAMMOGRAM: ICD-10-CM

## 2022-01-10 PROCEDURE — 77065 DX MAMMO INCL CAD UNI: CPT | Performed by: SURGERY

## 2022-01-10 PROCEDURE — 77061 BREAST TOMOSYNTHESIS UNI: CPT | Performed by: SURGERY

## 2022-01-10 PROCEDURE — 76642 ULTRASOUND BREAST LIMITED: CPT | Performed by: SURGERY

## 2022-01-10 NOTE — IMAGING NOTE
This Breast Care RN assisted Dr. Berna Fraser with recommendation for a RB biopsy for distrotion noted near previous lumpectomy site. Procedure reviewed and all questions answered. Emotional and educational support given.    On the day of the biopsy, pt instruct

## 2022-01-21 ENCOUNTER — HOSPITAL ENCOUNTER (OUTPATIENT)
Dept: ULTRASOUND IMAGING | Age: 77
Discharge: HOME | End: 2022-01-21
Attending: SURGERY
Payer: MEDICARE

## 2022-01-21 ENCOUNTER — HOSPITAL ENCOUNTER (OUTPATIENT)
Dept: MAMMOGRAPHY | Age: 77
Discharge: HOME | End: 2022-01-21
Attending: SURGERY
Payer: MEDICARE

## 2022-01-21 DIAGNOSIS — R92.8 ABNORMAL MAMMOGRAM: ICD-10-CM

## 2022-01-21 PROCEDURE — 88305 TISSUE EXAM BY PATHOLOGIST: CPT | Performed by: SURGERY

## 2022-01-21 PROCEDURE — 88360 TUMOR IMMUNOHISTOCHEM/MANUAL: CPT | Performed by: SURGERY

## 2022-01-21 PROCEDURE — 77065 DX MAMMO INCL CAD UNI: CPT | Performed by: SURGERY

## 2022-01-21 PROCEDURE — 19083 BX BREAST 1ST LESION US IMAG: CPT | Performed by: SURGERY

## 2022-01-21 PROCEDURE — 88344 IMHCHEM/IMCYTCHM EA MLT ANTB: CPT | Performed by: SURGERY

## 2022-01-21 NOTE — IMAGING NOTE
Assisted Dr Jackie Florian w/US guided breast biopsy on R breast x1 site. Pt tolerated procedure well w/10 minutes of firm pressure applied. Steri-strip applied following hemostasis and no hematoma noted.  Discharge instructions reviewed and pt verbalized understand

## 2022-01-26 ENCOUNTER — NURSE NAVIGATOR ENCOUNTER (OUTPATIENT)
Dept: HEMATOLOGY/ONCOLOGY | Facility: HOSPITAL | Age: 77
End: 2022-01-26

## 2022-01-26 NOTE — PROGRESS NOTES
Spoke with patient regarding newly diagnosed breast cancer. Pt spoke with Dr. Hai Greene, who recommends that she meet with Dr. Sil Judd.  Pt has appt on 2/9 in PF office, offered patient sooner appointment at [facility] or [place] locations, but she would l

## 2022-02-08 ENCOUNTER — OFFICE VISIT (OUTPATIENT)
Dept: SURGERY | Facility: CLINIC | Age: 77
End: 2022-02-08
Payer: MEDICARE

## 2022-02-08 VITALS
DIASTOLIC BLOOD PRESSURE: 79 MMHG | SYSTOLIC BLOOD PRESSURE: 145 MMHG | WEIGHT: 145 LBS | RESPIRATION RATE: 16 BRPM | HEART RATE: 76 BPM | BODY MASS INDEX: 26.68 KG/M2 | OXYGEN SATURATION: 97 % | HEIGHT: 61.8 IN

## 2022-02-08 DIAGNOSIS — Z17.0 MALIGNANT NEOPLASM OF UPPER-OUTER QUADRANT OF RIGHT BREAST IN FEMALE, ESTROGEN RECEPTOR POSITIVE (HCC): Primary | ICD-10-CM

## 2022-02-08 DIAGNOSIS — C50.411 MALIGNANT NEOPLASM OF UPPER-OUTER QUADRANT OF RIGHT BREAST IN FEMALE, ESTROGEN RECEPTOR POSITIVE (HCC): Primary | ICD-10-CM

## 2022-02-08 PROCEDURE — 99203 OFFICE O/P NEW LOW 30 MIN: CPT | Performed by: SURGERY

## 2022-02-08 PROCEDURE — 3077F SYST BP >= 140 MM HG: CPT | Performed by: SURGERY

## 2022-02-08 PROCEDURE — 3078F DIAST BP <80 MM HG: CPT | Performed by: SURGERY

## 2022-02-08 PROCEDURE — 3008F BODY MASS INDEX DOCD: CPT | Performed by: SURGERY

## 2022-02-09 ENCOUNTER — TELEPHONE (OUTPATIENT)
Dept: SURGERY | Facility: CLINIC | Age: 77
End: 2022-02-09

## 2022-02-09 ENCOUNTER — OFFICE VISIT (OUTPATIENT)
Dept: HEMATOLOGY/ONCOLOGY | Age: 77
End: 2022-02-09
Attending: SPECIALIST
Payer: MEDICARE

## 2022-02-09 VITALS
RESPIRATION RATE: 18 BRPM | WEIGHT: 144 LBS | HEART RATE: 87 BPM | BODY MASS INDEX: 26.5 KG/M2 | TEMPERATURE: 99 F | HEIGHT: 62 IN | DIASTOLIC BLOOD PRESSURE: 83 MMHG | SYSTOLIC BLOOD PRESSURE: 163 MMHG | OXYGEN SATURATION: 97 %

## 2022-02-09 DIAGNOSIS — C50.911 RECURRENT BREAST CANCER, RIGHT (HCC): Primary | ICD-10-CM

## 2022-02-09 PROCEDURE — 99205 OFFICE O/P NEW HI 60 MIN: CPT | Performed by: SPECIALIST

## 2022-02-09 RX ORDER — BETA-GLUCAN, (1-3) (1-4) 70 %
2 POWDER (GRAM) MISCELLANEOUS DAILY
COMMUNITY
Start: 2022-01-01

## 2022-02-09 NOTE — TELEPHONE ENCOUNTER
Medical clearance request letter faxed to patient's PCP after pt LVM requesting letter to be sent. LVM to pt's cell that letter was faxed today.

## 2022-02-14 ENCOUNTER — OFFICE VISIT (OUTPATIENT)
Dept: FAMILY MEDICINE CLINIC | Facility: CLINIC | Age: 77
End: 2022-02-14
Payer: MEDICARE

## 2022-02-14 ENCOUNTER — LAB ENCOUNTER (OUTPATIENT)
Dept: LAB | Age: 77
End: 2022-02-14
Attending: STUDENT IN AN ORGANIZED HEALTH CARE EDUCATION/TRAINING PROGRAM
Payer: MEDICARE

## 2022-02-14 VITALS — HEIGHT: 61 IN | BODY MASS INDEX: 27 KG/M2 | WEIGHT: 143 LBS

## 2022-02-14 DIAGNOSIS — Z17.0 MALIGNANT NEOPLASM OF UPPER-OUTER QUADRANT OF RIGHT BREAST IN FEMALE, ESTROGEN RECEPTOR POSITIVE (HCC): ICD-10-CM

## 2022-02-14 DIAGNOSIS — Z01.818 PREOPERATIVE EXAMINATION: Primary | ICD-10-CM

## 2022-02-14 DIAGNOSIS — E11.9 DIET-CONTROLLED DIABETES MELLITUS (HCC): ICD-10-CM

## 2022-02-14 DIAGNOSIS — E53.8 VITAMIN B12 DEFICIENCY: ICD-10-CM

## 2022-02-14 DIAGNOSIS — C50.411 MALIGNANT NEOPLASM OF UPPER-OUTER QUADRANT OF RIGHT BREAST IN FEMALE, ESTROGEN RECEPTOR POSITIVE (HCC): ICD-10-CM

## 2022-02-14 DIAGNOSIS — E55.9 VITAMIN D DEFICIENCY: ICD-10-CM

## 2022-02-14 DIAGNOSIS — Z01.818 PREOPERATIVE EXAMINATION: ICD-10-CM

## 2022-02-14 LAB
ALBUMIN SERPL-MCNC: 3.9 G/DL (ref 3.4–5)
ALBUMIN/GLOB SERPL: 1.1 {RATIO} (ref 1–2)
ALP LIVER SERPL-CCNC: 60 U/L
ALT SERPL-CCNC: 26 U/L
ANION GAP SERPL CALC-SCNC: 5 MMOL/L (ref 0–18)
AST SERPL-CCNC: 22 U/L (ref 15–37)
ATRIAL RATE: 71 BPM
BASOPHILS # BLD AUTO: 0.04 X10(3) UL (ref 0–0.2)
BASOPHILS NFR BLD AUTO: 1.3 %
BILIRUB SERPL-MCNC: 0.8 MG/DL (ref 0.1–2)
BUN BLD-MCNC: 15 MG/DL (ref 7–18)
CALCIUM BLD-MCNC: 9.5 MG/DL (ref 8.5–10.1)
CHLORIDE SERPL-SCNC: 106 MMOL/L (ref 98–112)
CHOLEST SERPL-MCNC: 220 MG/DL (ref ?–200)
CO2 SERPL-SCNC: 29 MMOL/L (ref 21–32)
CREAT BLD-MCNC: 0.91 MG/DL
CREAT UR-SCNC: 107 MG/DL
EOSINOPHIL # BLD AUTO: 0.05 X10(3) UL (ref 0–0.7)
EOSINOPHIL NFR BLD AUTO: 1.7 %
ERYTHROCYTE [DISTWIDTH] IN BLOOD BY AUTOMATED COUNT: 12.3 %
EST. AVERAGE GLUCOSE BLD GHB EST-MCNC: 105 MG/DL (ref 68–126)
FASTING PATIENT LIPID ANSWER: NO
FASTING STATUS PATIENT QL REPORTED: NO
GLOBULIN PLAS-MCNC: 3.7 G/DL (ref 2.8–4.4)
GLUCOSE BLD-MCNC: 106 MG/DL (ref 70–99)
HBA1C MFR BLD: 5.3 % (ref ?–5.7)
HCT VFR BLD AUTO: 43 %
HDLC SERPL-MCNC: 68 MG/DL (ref 40–59)
HGB BLD-MCNC: 14.1 G/DL
IMM GRANULOCYTES # BLD AUTO: 0.01 X10(3) UL (ref 0–1)
IMM GRANULOCYTES NFR BLD: 0.3 %
LDLC SERPL CALC-MCNC: 120 MG/DL (ref ?–100)
LYMPHOCYTES # BLD AUTO: 0.87 X10(3) UL (ref 1–4)
LYMPHOCYTES NFR BLD AUTO: 29.3 %
MCH RBC QN AUTO: 32 PG (ref 26–34)
MCHC RBC AUTO-ENTMCNC: 32.8 G/DL (ref 31–37)
MCV RBC AUTO: 97.5 FL
MICROALBUMIN UR-MCNC: 1.49 MG/DL
MICROALBUMIN/CREAT 24H UR-RTO: 13.9 UG/MG (ref ?–30)
MONOCYTES # BLD AUTO: 0.32 X10(3) UL (ref 0.1–1)
MONOCYTES NFR BLD AUTO: 10.8 %
NEUTROPHILS # BLD AUTO: 1.68 X10 (3) UL (ref 1.5–7.7)
NEUTROPHILS # BLD AUTO: 1.68 X10(3) UL (ref 1.5–7.7)
NEUTROPHILS NFR BLD AUTO: 56.6 %
NONHDLC SERPL-MCNC: 152 MG/DL (ref ?–130)
OSMOLALITY SERPL CALC.SUM OF ELEC: 291 MOSM/KG (ref 275–295)
P-R INTERVAL: 158 MS
PLATELET # BLD AUTO: 245 10(3)UL (ref 150–450)
POTASSIUM SERPL-SCNC: 4.8 MMOL/L (ref 3.5–5.1)
PROT SERPL-MCNC: 7.6 G/DL (ref 6.4–8.2)
Q-T INTERVAL: 398 MS
QRS DURATION: 82 MS
QTC CALCULATION (BEZET): 432 MS
R AXIS: 39 DEGREES
RBC # BLD AUTO: 4.41 X10(6)UL
SODIUM SERPL-SCNC: 140 MMOL/L (ref 136–145)
T AXIS: 63 DEGREES
TRIGL SERPL-MCNC: 184 MG/DL (ref 30–149)
VENTRICULAR RATE: 71 BPM
VIT B12 SERPL-MCNC: 1528 PG/ML (ref 193–986)
VIT D+METAB SERPL-MCNC: 110.7 NG/ML (ref 30–100)
VLDLC SERPL CALC-MCNC: 32 MG/DL (ref 0–30)
WBC # BLD AUTO: 3 X10(3) UL (ref 4–11)

## 2022-02-14 PROCEDURE — 82570 ASSAY OF URINE CREATININE: CPT

## 2022-02-14 PROCEDURE — 93005 ELECTROCARDIOGRAM TRACING: CPT

## 2022-02-14 PROCEDURE — 82043 UR ALBUMIN QUANTITATIVE: CPT

## 2022-02-14 PROCEDURE — 3008F BODY MASS INDEX DOCD: CPT | Performed by: STUDENT IN AN ORGANIZED HEALTH CARE EDUCATION/TRAINING PROGRAM

## 2022-02-14 PROCEDURE — 80053 COMPREHEN METABOLIC PANEL: CPT

## 2022-02-14 PROCEDURE — 93010 ELECTROCARDIOGRAM REPORT: CPT | Performed by: INTERNAL MEDICINE

## 2022-02-14 PROCEDURE — 82306 VITAMIN D 25 HYDROXY: CPT

## 2022-02-14 PROCEDURE — 85025 COMPLETE CBC W/AUTO DIFF WBC: CPT

## 2022-02-14 PROCEDURE — 82607 VITAMIN B-12: CPT

## 2022-02-14 PROCEDURE — 80061 LIPID PANEL: CPT

## 2022-02-14 PROCEDURE — 83036 HEMOGLOBIN GLYCOSYLATED A1C: CPT

## 2022-02-14 PROCEDURE — 99214 OFFICE O/P EST MOD 30 MIN: CPT | Performed by: STUDENT IN AN ORGANIZED HEALTH CARE EDUCATION/TRAINING PROGRAM

## 2022-02-14 PROCEDURE — 84425 ASSAY OF VITAMIN B-1: CPT

## 2022-02-15 ENCOUNTER — TELEPHONE (OUTPATIENT)
Dept: SURGERY | Facility: CLINIC | Age: 77
End: 2022-02-15

## 2022-02-15 NOTE — TELEPHONE ENCOUNTER
Patient was called and offered surgery Date 3/14/2022. Patient excepted. Pt reminded to complete medical clearance. Pt verbalized understanding.

## 2022-02-17 LAB — VITAMIN B1 (THIAMINE), WHOLE B: 170 NMOL/L

## 2022-02-22 ENCOUNTER — NURSE NAVIGATOR ENCOUNTER (OUTPATIENT)
Dept: HEMATOLOGY/ONCOLOGY | Facility: HOSPITAL | Age: 77
End: 2022-02-22

## 2022-02-22 NOTE — PROGRESS NOTES
Spoke with patient regarding pre operative mastectomy class. She has surgery scheduled on 3/14 with Sharyn Gardner, Dr. Haylee Alrdidge is her oncologist. Magdalene Peabody in scheduled for 3/9 at 1630. Encouraged pt to phone with any other questions or concerns.

## 2022-03-01 ENCOUNTER — TELEPHONE (OUTPATIENT)
Dept: GENERAL RADIOLOGY | Facility: HOSPITAL | Age: 77
End: 2022-03-01

## 2022-03-01 NOTE — TELEPHONE ENCOUNTER
1157: Spoke with Tyrone Jacobsen regarding sentinel lymph node mapping procedure to be done in the nuclear medicine department  prior to surgery on Monday, March 14. Procedure reviewed. Ms. Guy Jarrell verbalized understanding. No questions at this time. Ms. Guy Jarrell verbalized gratitude for the call.

## 2022-03-09 ENCOUNTER — APPOINTMENT (OUTPATIENT)
Dept: HEMATOLOGY/ONCOLOGY | Facility: HOSPITAL | Age: 77
End: 2022-03-09
Attending: SPECIALIST
Payer: MEDICARE

## 2022-03-09 ENCOUNTER — NURSE NAVIGATOR ENCOUNTER (OUTPATIENT)
Dept: HEMATOLOGY/ONCOLOGY | Facility: HOSPITAL | Age: 77
End: 2022-03-09

## 2022-03-09 NOTE — PROGRESS NOTES
Pt and support person attended pre-operative mastectomy course in the cancer center. We discussed post operative pain control/constipation risk, incision/drain care, sentinel node procedure and risk for lymphedema. Supplies for home care will be provided by the hospital at the time of discharge. Pt was given a bag with heart shaped pillow, lanyard and safety pins to aid in care at home. Resources provided for ladies' specialty boutiques and post operative resources. Pathology and follow up timelines reviewed. Re-enforced teaching regarding emergency care and when to contact surgeon's office. Pt's were given navigator contact information and encouraged to reach out with any other questions or concerns.

## 2022-03-11 ENCOUNTER — LAB ENCOUNTER (OUTPATIENT)
Dept: LAB | Age: 77
End: 2022-03-11
Attending: SURGERY
Payer: MEDICARE

## 2022-03-11 DIAGNOSIS — Z01.812 ENCOUNTER FOR PREOPERATIVE SCREENING LABORATORY TESTING FOR COVID-19 VIRUS: ICD-10-CM

## 2022-03-11 DIAGNOSIS — Z20.822 ENCOUNTER FOR PREOPERATIVE SCREENING LABORATORY TESTING FOR COVID-19 VIRUS: ICD-10-CM

## 2022-03-12 LAB — SARS-COV-2 RNA RESP QL NAA+PROBE: NOT DETECTED

## 2022-03-14 ENCOUNTER — HOSPITAL ENCOUNTER (OUTPATIENT)
Facility: HOSPITAL | Age: 77
Setting detail: HOSPITAL OUTPATIENT SURGERY
Discharge: HOME | End: 2022-03-14
Attending: SURGERY | Admitting: SURGERY
Payer: MEDICARE

## 2022-03-14 ENCOUNTER — HOSPITAL ENCOUNTER (OUTPATIENT)
Dept: NUCLEAR MEDICINE | Facility: HOSPITAL | Age: 77
Discharge: HOME | End: 2022-03-14
Attending: SURGERY
Payer: MEDICARE

## 2022-03-14 ENCOUNTER — ANESTHESIA (OUTPATIENT)
Dept: SURGERY | Facility: HOSPITAL | Age: 77
End: 2022-03-14
Payer: MEDICARE

## 2022-03-14 ENCOUNTER — ANESTHESIA EVENT (OUTPATIENT)
Dept: SURGERY | Facility: HOSPITAL | Age: 77
End: 2022-03-14
Payer: MEDICARE

## 2022-03-14 VITALS
WEIGHT: 138.81 LBS | DIASTOLIC BLOOD PRESSURE: 81 MMHG | BODY MASS INDEX: 26.21 KG/M2 | TEMPERATURE: 98 F | HEART RATE: 95 BPM | HEIGHT: 61 IN | RESPIRATION RATE: 14 BRPM | OXYGEN SATURATION: 96 % | SYSTOLIC BLOOD PRESSURE: 140 MMHG

## 2022-03-14 DIAGNOSIS — C50.411 MALIGNANT NEOPLASM OF UPPER-OUTER QUADRANT OF RIGHT BREAST IN FEMALE, ESTROGEN RECEPTOR POSITIVE (HCC): ICD-10-CM

## 2022-03-14 DIAGNOSIS — Z17.0 MALIGNANT NEOPLASM OF UPPER-OUTER QUADRANT OF RIGHT BREAST IN FEMALE, ESTROGEN RECEPTOR POSITIVE (HCC): ICD-10-CM

## 2022-03-14 DIAGNOSIS — Z01.812 ENCOUNTER FOR PREOPERATIVE SCREENING LABORATORY TESTING FOR COVID-19 VIRUS: Primary | ICD-10-CM

## 2022-03-14 DIAGNOSIS — Z20.822 ENCOUNTER FOR PREOPERATIVE SCREENING LABORATORY TESTING FOR COVID-19 VIRUS: Primary | ICD-10-CM

## 2022-03-14 LAB
GLUCOSE BLD-MCNC: 107 MG/DL (ref 70–99)
GLUCOSE BLD-MCNC: 130 MG/DL (ref 70–99)

## 2022-03-14 PROCEDURE — 82962 GLUCOSE BLOOD TEST: CPT

## 2022-03-14 PROCEDURE — 88307 TISSUE EXAM BY PATHOLOGIST: CPT | Performed by: SURGERY

## 2022-03-14 PROCEDURE — 78195 LYMPH SYSTEM IMAGING: CPT | Performed by: SURGERY

## 2022-03-14 PROCEDURE — 0HRT0JZ REPLACEMENT OF RIGHT BREAST WITH SYNTHETIC SUBSTITUTE, OPEN APPROACH: ICD-10-PCS | Performed by: SURGERY

## 2022-03-14 PROCEDURE — 0HHT0NZ INSERTION OF TISSUE EXPANDER INTO RIGHT BREAST, OPEN APPROACH: ICD-10-PCS | Performed by: SURGERY

## 2022-03-14 PROCEDURE — 76942 ECHO GUIDE FOR BIOPSY: CPT | Performed by: ANESTHESIOLOGY

## 2022-03-14 PROCEDURE — 0HTT0ZZ RESECTION OF RIGHT BREAST, OPEN APPROACH: ICD-10-PCS | Performed by: SURGERY

## 2022-03-14 DEVICE — MATRIX ALLODERM SELECT: Type: IMPLANTABLE DEVICE | Site: BREAST | Status: FUNCTIONAL

## 2022-03-14 DEVICE — BREAST TISSUE EXPANDER, SUTURE TABS, INTEGRAL INJECTION DOME, 475CC
Type: IMPLANTABLE DEVICE | Site: BREAST | Status: FUNCTIONAL
Brand: MENTOR ARTOURA PLUS, SMOOTH, HIGH PROFILE

## 2022-03-14 RX ORDER — GLYCOPYRROLATE 0.2 MG/ML
INJECTION, SOLUTION INTRAMUSCULAR; INTRAVENOUS AS NEEDED
Status: DISCONTINUED | OUTPATIENT
Start: 2022-03-14 | End: 2022-03-14 | Stop reason: SURG

## 2022-03-14 RX ORDER — LIDOCAINE HYDROCHLORIDE 10 MG/ML
INJECTION, SOLUTION EPIDURAL; INFILTRATION; INTRACAUDAL; PERINEURAL AS NEEDED
Status: DISCONTINUED | OUTPATIENT
Start: 2022-03-14 | End: 2022-03-14 | Stop reason: SURG

## 2022-03-14 RX ORDER — DOCUSATE SODIUM 100 MG/1
100 CAPSULE, LIQUID FILLED ORAL 2 TIMES DAILY
Qty: 40 CAPSULE | Refills: 0 | Status: SHIPPED | OUTPATIENT
Start: 2022-03-14

## 2022-03-14 RX ORDER — ONDANSETRON 2 MG/ML
INJECTION INTRAMUSCULAR; INTRAVENOUS AS NEEDED
Status: DISCONTINUED | OUTPATIENT
Start: 2022-03-14 | End: 2022-03-14 | Stop reason: SURG

## 2022-03-14 RX ORDER — METOCLOPRAMIDE HYDROCHLORIDE 5 MG/ML
INJECTION INTRAMUSCULAR; INTRAVENOUS AS NEEDED
Status: DISCONTINUED | OUTPATIENT
Start: 2022-03-14 | End: 2022-03-14 | Stop reason: SURG

## 2022-03-14 RX ORDER — DEXTROSE MONOHYDRATE 25 G/50ML
50 INJECTION, SOLUTION INTRAVENOUS
Status: DISCONTINUED | OUTPATIENT
Start: 2022-03-14 | End: 2022-03-14

## 2022-03-14 RX ORDER — CLINDAMYCIN PHOSPHATE 900 MG/50ML
900 INJECTION INTRAVENOUS ONCE
Status: COMPLETED | OUTPATIENT
Start: 2022-03-14 | End: 2022-03-14

## 2022-03-14 RX ORDER — DEXAMETHASONE SODIUM PHOSPHATE 10 MG/ML
INJECTION, SOLUTION INTRAMUSCULAR; INTRAVENOUS AS NEEDED
Status: DISCONTINUED | OUTPATIENT
Start: 2022-03-14 | End: 2022-03-14 | Stop reason: SURG

## 2022-03-14 RX ORDER — HYDROMORPHONE HYDROCHLORIDE 1 MG/ML
0.4 INJECTION, SOLUTION INTRAMUSCULAR; INTRAVENOUS; SUBCUTANEOUS EVERY 5 MIN PRN
Status: DISCONTINUED | OUTPATIENT
Start: 2022-03-14 | End: 2022-03-14

## 2022-03-14 RX ORDER — EPHEDRINE SULFATE 50 MG/ML
INJECTION INTRAVENOUS AS NEEDED
Status: DISCONTINUED | OUTPATIENT
Start: 2022-03-14 | End: 2022-03-14 | Stop reason: SURG

## 2022-03-14 RX ORDER — MEPERIDINE HYDROCHLORIDE 25 MG/ML
12.5 INJECTION INTRAMUSCULAR; INTRAVENOUS; SUBCUTANEOUS AS NEEDED
Status: DISCONTINUED | OUTPATIENT
Start: 2022-03-14 | End: 2022-03-14

## 2022-03-14 RX ORDER — ONDANSETRON 4 MG/1
4 TABLET, FILM COATED ORAL EVERY 8 HOURS PRN
Qty: 30 TABLET | Refills: 0 | Status: SHIPPED | OUTPATIENT
Start: 2022-03-14

## 2022-03-14 RX ORDER — DEXAMETHASONE SODIUM PHOSPHATE 4 MG/ML
VIAL (ML) INJECTION AS NEEDED
Status: DISCONTINUED | OUTPATIENT
Start: 2022-03-14 | End: 2022-03-14 | Stop reason: SURG

## 2022-03-14 RX ORDER — MIDAZOLAM HYDROCHLORIDE 1 MG/ML
INJECTION INTRAMUSCULAR; INTRAVENOUS AS NEEDED
Status: DISCONTINUED | OUTPATIENT
Start: 2022-03-14 | End: 2022-03-14 | Stop reason: SURG

## 2022-03-14 RX ORDER — NICOTINE POLACRILEX 4 MG
30 LOZENGE BUCCAL
Status: DISCONTINUED | OUTPATIENT
Start: 2022-03-14 | End: 2022-03-14

## 2022-03-14 RX ORDER — NEOSTIGMINE METHYLSULFATE 1 MG/ML
INJECTION INTRAVENOUS AS NEEDED
Status: DISCONTINUED | OUTPATIENT
Start: 2022-03-14 | End: 2022-03-14 | Stop reason: SURG

## 2022-03-14 RX ORDER — SODIUM CHLORIDE, SODIUM LACTATE, POTASSIUM CHLORIDE, CALCIUM CHLORIDE 600; 310; 30; 20 MG/100ML; MG/100ML; MG/100ML; MG/100ML
INJECTION, SOLUTION INTRAVENOUS CONTINUOUS
Status: DISCONTINUED | OUTPATIENT
Start: 2022-03-14 | End: 2022-03-14

## 2022-03-14 RX ORDER — NICOTINE POLACRILEX 4 MG
15 LOZENGE BUCCAL
Status: DISCONTINUED | OUTPATIENT
Start: 2022-03-14 | End: 2022-03-14

## 2022-03-14 RX ORDER — DIAZEPAM 5 MG/1
5 TABLET ORAL AS NEEDED
Status: DISCONTINUED | OUTPATIENT
Start: 2022-03-14 | End: 2022-03-14 | Stop reason: HOSPADM

## 2022-03-14 RX ORDER — HYDROCODONE BITARTRATE AND ACETAMINOPHEN 5; 325 MG/1; MG/1
2 TABLET ORAL AS NEEDED
Status: DISCONTINUED | OUTPATIENT
Start: 2022-03-14 | End: 2022-03-14

## 2022-03-14 RX ORDER — ACETAMINOPHEN 500 MG
1000 TABLET ORAL ONCE
Status: DISCONTINUED | OUTPATIENT
Start: 2022-03-14 | End: 2022-03-14 | Stop reason: HOSPADM

## 2022-03-14 RX ORDER — NALOXONE HYDROCHLORIDE 0.4 MG/ML
80 INJECTION, SOLUTION INTRAMUSCULAR; INTRAVENOUS; SUBCUTANEOUS AS NEEDED
Status: DISCONTINUED | OUTPATIENT
Start: 2022-03-14 | End: 2022-03-14

## 2022-03-14 RX ORDER — LIDOCAINE AND PRILOCAINE 25; 25 MG/G; MG/G
CREAM TOPICAL ONCE
Status: COMPLETED | OUTPATIENT
Start: 2022-03-14 | End: 2022-03-14

## 2022-03-14 RX ORDER — TRAMADOL HYDROCHLORIDE 50 MG/1
TABLET ORAL EVERY 4 HOURS PRN
Qty: 40 TABLET | Refills: 0 | Status: SHIPPED | OUTPATIENT
Start: 2022-03-14

## 2022-03-14 RX ORDER — CLINDAMYCIN HYDROCHLORIDE 300 MG/1
300 CAPSULE ORAL 3 TIMES DAILY
Qty: 30 CAPSULE | Refills: 2 | Status: SHIPPED | OUTPATIENT
Start: 2022-03-14 | End: 2022-03-24

## 2022-03-14 RX ORDER — ONDANSETRON 2 MG/ML
4 INJECTION INTRAMUSCULAR; INTRAVENOUS AS NEEDED
Status: DISCONTINUED | OUTPATIENT
Start: 2022-03-14 | End: 2022-03-14

## 2022-03-14 RX ORDER — ROCURONIUM BROMIDE 10 MG/ML
INJECTION, SOLUTION INTRAVENOUS AS NEEDED
Status: DISCONTINUED | OUTPATIENT
Start: 2022-03-14 | End: 2022-03-14 | Stop reason: SURG

## 2022-03-14 RX ORDER — BUPIVACAINE HYDROCHLORIDE 2.5 MG/ML
INJECTION, SOLUTION EPIDURAL; INFILTRATION; INTRACAUDAL AS NEEDED
Status: DISCONTINUED | OUTPATIENT
Start: 2022-03-14 | End: 2022-03-14 | Stop reason: SURG

## 2022-03-14 RX ORDER — HYDROCODONE BITARTRATE AND ACETAMINOPHEN 5; 325 MG/1; MG/1
1 TABLET ORAL AS NEEDED
Status: DISCONTINUED | OUTPATIENT
Start: 2022-03-14 | End: 2022-03-14

## 2022-03-14 RX ADMIN — EPHEDRINE SULFATE 10 MG: 50 INJECTION INTRAVENOUS at 10:35:00

## 2022-03-14 RX ADMIN — SODIUM CHLORIDE, SODIUM LACTATE, POTASSIUM CHLORIDE, CALCIUM CHLORIDE: 600; 310; 30; 20 INJECTION, SOLUTION INTRAVENOUS at 11:25:00

## 2022-03-14 RX ADMIN — METOCLOPRAMIDE HYDROCHLORIDE 10 MG: 5 INJECTION INTRAMUSCULAR; INTRAVENOUS at 10:28:00

## 2022-03-14 RX ADMIN — EPHEDRINE SULFATE 10 MG: 50 INJECTION INTRAVENOUS at 10:48:00

## 2022-03-14 RX ADMIN — EPHEDRINE SULFATE 10 MG: 50 INJECTION INTRAVENOUS at 10:34:00

## 2022-03-14 RX ADMIN — CLINDAMYCIN PHOSPHATE 900 MG: 900 INJECTION INTRAVENOUS at 10:37:00

## 2022-03-14 RX ADMIN — GLYCOPYRROLATE 0.4 MG: 0.2 INJECTION, SOLUTION INTRAMUSCULAR; INTRAVENOUS at 11:55:00

## 2022-03-14 RX ADMIN — MIDAZOLAM HYDROCHLORIDE 2 MG: 1 INJECTION INTRAMUSCULAR; INTRAVENOUS at 10:24:00

## 2022-03-14 RX ADMIN — EPHEDRINE SULFATE 10 MG: 50 INJECTION INTRAVENOUS at 10:49:00

## 2022-03-14 RX ADMIN — ROCURONIUM BROMIDE 40 MG: 10 INJECTION, SOLUTION INTRAVENOUS at 10:28:00

## 2022-03-14 RX ADMIN — ONDANSETRON 4 MG: 2 INJECTION INTRAMUSCULAR; INTRAVENOUS at 10:28:00

## 2022-03-14 RX ADMIN — SODIUM CHLORIDE, SODIUM LACTATE, POTASSIUM CHLORIDE, CALCIUM CHLORIDE: 600; 310; 30; 20 INJECTION, SOLUTION INTRAVENOUS at 11:54:00

## 2022-03-14 RX ADMIN — DEXAMETHASONE SODIUM PHOSPHATE 2 MG: 10 INJECTION, SOLUTION INTRAMUSCULAR; INTRAVENOUS at 10:35:00

## 2022-03-14 RX ADMIN — DEXAMETHASONE SODIUM PHOSPHATE 8 MG: 4 MG/ML VIAL (ML) INJECTION at 10:28:00

## 2022-03-14 RX ADMIN — SODIUM CHLORIDE, SODIUM LACTATE, POTASSIUM CHLORIDE, CALCIUM CHLORIDE: 600; 310; 30; 20 INJECTION, SOLUTION INTRAVENOUS at 10:18:00

## 2022-03-14 RX ADMIN — LIDOCAINE HYDROCHLORIDE 50 MG: 10 INJECTION, SOLUTION EPIDURAL; INFILTRATION; INTRACAUDAL; PERINEURAL at 10:28:00

## 2022-03-14 RX ADMIN — NEOSTIGMINE METHYLSULFATE 3 MG: 1 INJECTION INTRAVENOUS at 11:55:00

## 2022-03-14 RX ADMIN — BUPIVACAINE HYDROCHLORIDE 30 ML: 2.5 INJECTION, SOLUTION EPIDURAL; INFILTRATION; INTRACAUDAL at 10:35:00

## 2022-03-14 RX ADMIN — SODIUM CHLORIDE, SODIUM LACTATE, POTASSIUM CHLORIDE, CALCIUM CHLORIDE: 600; 310; 30; 20 INJECTION, SOLUTION INTRAVENOUS at 10:55:00

## 2022-03-14 NOTE — ANESTHESIA PROCEDURE NOTES
Airway  Date/Time: 3/14/2022 10:30 AM  Urgency: Elective    Airway not difficult    General Information and Staff    Patient location during procedure: OR  Anesthesiologist: Diamond Bryant MD  Resident/CRNA: Erin Mims CRNA  Performed: CRNA     Indications and Patient Condition  Indications for airway management: anesthesia  Sedation level: deep  Preoxygenated: yes  Patient position: sniffing  Mask difficulty assessment: 1 - vent by mask    Final Airway Details  Final airway type: endotracheal airway      Successful airway: ETT  Cuffed: yes   Successful intubation technique: direct laryngoscopy  Facilitating devices/methods: intubating stylet  Endotracheal tube insertion site: oral  Blade: Paty  Blade size: #4  ETT size (mm): 7.0    Cormack-Lehane Classification: grade IIA - partial view of glottis  Placement verified by: chest auscultation and capnometry   Cuff volume (mL): 7  Measured from: teeth  ETT to teeth (cm): 22  Number of attempts at approach: 1    Additional Comments  Teeth lips and tongue pre induction condition.

## 2022-03-14 NOTE — INTERVAL H&P NOTE
Pre-op Diagnosis: Malignant neoplasm of upper-outer quadrant of right breast in female, estrogen receptor positive ([facility] [C50.411, Z17.0]  Plan right mastectomy sn if possible,axillary dissection if needed  The above referenced H&P was reviewed by Radha Peter MD on 3/14/2022, the patient was examined and no significant changes have occurred in the patient's condition since the H&P was performed. I discussed with the patient and/or legal representative the potential benefits, risks and side effects of this procedure; the likelihood of the patient achieving goals; and potential problems that might occur during recuperation. I discussed reasonable alternatives to the procedure, including risks, benefits and side effects related to the alternatives and risks related to not receiving this procedure. We will proceed with procedure as planned.

## 2022-03-14 NOTE — ANESTHESIA POSTPROCEDURE EVALUATION
[address] [facility] Patient Status:  Hospital Outpatient Surgery   Age/Gender 78year old female MRN [id number]   [facility] SURGERY Attending Maged Tai MD   Hosp Day # 0 PCP Ele Vela MD       Anesthesia Post-op Note    Right Breast Mastectomy  [facility]) Immediate breast reconstruction with placement of right breast tissue expander with acellular dermal matrix (JONATHON)    Procedure Summary     Date: 03/14/22 Room / Location: [address] [place] [address] OR 03 / [facility] OR    Anesthesia Start: 1024 Anesthesia Stop:     Procedures:       [facility] Immediate breast reconstruction with placement of right breast tissue expander with acellular dermal matrix (JONATHON) (Right Breast)      Immediate breast reconstruction with placement of right breast tissue expander with acellular dermal matrix (JONATHON) (Right Breast) Diagnosis:       Malignant neoplasm of upper-outer quadrant of right breast in female, estrogen receptor positive ([address]      (Malignant neoplasm of upper-outer quadrant of right breast in female, estrogen receptor positive ([address] Amalia Dow, Z17.0])    Surgeons: Maged Tai MD; Brenna Runner, MD Anesthesiologist: Miriam Matos MD    Anesthesia Type: general ASA Status: 2          Anesthesia Type: general    Vitals Value Taken Time   /100 03/14/22 1215   Temp 98.1 03/14/22 1215   Pulse 16 03/14/22 1215   Resp 16 03/14/22 1215   SpO2 96 % 03/14/22 1214   Vitals shown include unvalidated device data. Patient Location: PACU    Anesthesia Type: general    Airway Patency: patent    Postop Pain Control: adequate    Mental Status: preanesthetic baseline    Nausea/Vomiting: none    Cardiopulmonary/Hydration status: stable euvolemic    Complications: no apparent anesthesia related complications    Postop vital signs: stable    Dental Exam: Unchanged from Preop    Patient to be discharged from PACU when criteria met.

## 2022-03-14 NOTE — OPERATIVE REPORT
PREOPERATIVE DIAGNOSIS: Right breast cancer with acquired absence of the right breast.  POSTOPERATIVE DIAGNOSIS: Right breast cancer with acquired absence of the right breast.  PROCEDURE PERFORMED: Immediate right breast reconstruction with tissue expander and acellular dermal matrix. ASSISTANT: INDIRA Rose  ANESTHESIA: General with endotracheal intubation. ESTIMATED BLOOD LOSS: 10 ml  DRAINS: Loretta Canal x 2  SPECIMENS: None  COMPLICATIONS: None. FINDINGS: Right breast reconstructed with Clifton Artoura high profile, smooth tissue expander, 475 mL, reference SD-130H,  serial #[id number]. The tissue expander was placed in the prepectoral position with anterior coverage of Alloderm and filled intraoperatively with 90 mL of air. INDICATIONS: The patient is a 68-year-old female with right breast cancer. The patient was evaluated by Dr. Aliya Menendez and elected to undergo a right skin-sparing mastectomy. She was referred preoperatively to discuss reconstructive options and opted for immediate reconstruction with tissue expanders and acellular dermal matrix. PROCEDURE: Informed consent was obtained from the patient. The risks, benefits, and alternatives were reviewed with the patient preoperatively. She expressed understanding and wished to proceed. The patient was marked in the preoperative holding area in the upright position. The midline, inframammary fold, lateral fold of the breasts were marked. A periareolar incision was marked in conjunction with Dr. Aliya Menendez. The patient was then taken to the operating room, by Dr. Elsie Lindsey team where she underwent a  right skin-sparing mastectomy. Once Dr. Elsie Lindsey portion of the procedure was completed, the reconstructive portion of the procedure began. The surgical site was re-draped sterilely. The mastectomy skin flaps were examined and appeared of suitable thickness of viability to facilitate immediate reconstruction.   The pocket was irrigated with warm saline irrigation until all particulate fat was evacuated. Hemostasis was then secured with electrocautery. Next, the pocket was irrigated with Betadine irrigation and then with antibiotic irrigation until clear. Next gloves were changed and 2 sheets of medium contour perforated AlloDerm were brought on the field and prepared in saline. Two sheets of AlloDerm were secured along the long axis the running 2-0 PDS suture. The tissue expanders were then brought on the field and immediately bathed in  antibiotic irrigation. They were checked for integrity and noted to be intact. The AlloDerm was draped over the anterior surface of the tissue expander. Fenestrations were then created to allow passage of the suture tabs which were secured to the  AlloDerm with interrupted 2-0 PDS sutures. A posterior pursestring suture of 2-0 PDS was then placed. The expander was then accessed and all air was evacuated. Gloves were then changed and the surgical sites were isolated with Jania Boyer. The tissue expander/AlloDerm construct were then delivered via the incisions and sutured to the chest wall with interrupted 2-0 PDS sutures. The AlloDerm along the inferior gutter was secured along the inframammary fold with interrupted 2-0 Vicryl sutures. Superiorly the AlloDerm was secured to the pectoralis muscle with interrupted 2-0 Vicryl sutures. Laterally, the AlloDerm was secured to the serratus fascia with interrupted 2-0 Vicryl sutures. The mastectomy margins were inspected and appeared well-perfused. Two 15-Italian Stephen drains were exited through a lateral stab incision and sutured to the skin with 3-0 nylon suture. The expander was then accessed with a butterfly needle and filled with 90 mL of air. This permitted tension-free closure of the skin edges. The skin was then closed with interrupted 3-0 Vicryl deep dermal suture and running 4-0 Monocryl subcuticular suture.      The drain sites were dressed with BioPatch and Tegaderm. The incision was dressed with Exofin, steristrips, Fluff gauze, and a surgical bra. The patient was awakened, extubated, and taken to the recovery area in stable condition. There were no operative complications. All needle, sponge, and instrument counts were correct at the end of the procedure.

## 2022-03-14 NOTE — PROGRESS NOTES
Plan for right SS-mastectomy by Dr. Shade Fajardo and immediate reconstruction with tissue expander and acellular dermal matrix reviewed with patient and . The risks of surgery including but not limited to bleeding, infection, scarring, delayed wound healing, seroma, asymmetry, implant infection/extrusion requiring removal, expander deflation, injury to adjacent structures, capsular contracture, ALCL, and need for further surgery were reviewed. The expected post-operative course was discussed. Questions were answered to the patient's satisfaction. No guarantees as to outcome were offered. The patient expresses understanding and wishes to proceed.

## 2022-03-14 NOTE — BRIEF OP NOTE
Pre-Operative Diagnosis: Malignant neoplasm of upper-outer quadrant of right breast in female, estrogen receptor positive ([facility] [C50.411, Z17.0]     Post-Operative Diagnosis: Malignant neoplasm of upper-outer quadrant of right breast in female, estrogen receptor positive ([facility] [C50.411, Z17.0]      Procedure Performed:   Right Breast Mastectomy  ([place]) Immediate breast reconstruction with placement of right breast tissue expander with acellular dermal matrix (JONATHON)     Pre-pec, alloderm ADM, intra-op air fill    Surgeon(s) and Role:  Panel 1:     * Maged Tai MD - Primary  Panel 2:     * Brenna Runner, MD - Primary    Assistant(s):  PA: INDIRA Canas; Tomasa Hatchma     Surgical Findings: as dictated     Specimen: per Dr. Lexii Lemon 127     Estimated Blood Loss: Blood Output: 20 mL (3/14/2022 12:07 PM)    Jerome Baltazar PA-C  3/14/2022  12:15 PM

## 2022-03-15 NOTE — OPERATIVE REPORT
[facility]    PATIENT'S NAME: David Rios   ATTENDING PHYSICIAN: Tiarra Phoenix M.D. OPERATING PHYSICIAN: Tiarra Phoenix M.D. PATIENT ACCOUNT#:   [de-identified]    LOCATION:  [place] [address]  MEDICAL RECORD #:   [id number]       YOB: 1945  ADMISSION DATE:       03/14/2022      OPERATION DATE:  03/14/2022    OPERATIVE REPORT    PREOPERATIVE DIAGNOSIS:  Right breast cancer. POSTOPERATIVE DIAGNOSIS:  Right breast cancer. PROCEDURE:  Right mastectomy with intraoperative lymphatic mapping. ASSISTANT:  Marilou Wooten PA-C. Her assistance was necessary for prepping, draping, retracting, and assisting throughout the procedure. ANESTHESIA:  General.    OPERATIVE TECHNIQUE:  Patient was brought in the operating room, placed on operating table in supine position. Inhalation anesthesia provided by the attending anesthesiologist.  I had injected a mixture of methylene blue and saline around the nipple complex and performed gentle massage and earlier today, she was injected in Radiology for the lymphatic mapping. A serratus block was performed by the anesthesiologist.  Both breasts and axilla were prepped in usual sterile fashion. I began the surgery by making an elliptical skin incision around the nipple complex and her old lumpectomy scar. Her old lumpectomy scar was at the 9 o'clock position of the right breast and extended radially. I created skin flaps superior, inferior, medial, and lateral to the limits of the breast mound. I removed the breast off the chest wall using electrocautery. As I entered into the axilla, I looked for a blue lymphatic and look for a blue lymph node and I used the gamma probe, and I was not able to identify a sentinel node. She previously had a sentinel node procedure 5 years ago and this was not unexpected. Therefore, because of the rather small low-grade nature of her tumor, I did not perform an axillary dissection.   I informed her  of this decision and completed my part of the surgery. Hemostasis was good. At this time, Dr. Lilibeth Gallardo joined the operating team.  He will do the immediate reconstruction and dictate his portion of procedure separately.     Dictated By Adelina Rankin M.D.  d: 03/14/2022 12:22:40  t: 03/14/2022 22:45:34  Job [id number]  BWX/

## 2022-03-17 ENCOUNTER — TELEPHONE (OUTPATIENT)
Dept: SURGERY | Facility: CLINIC | Age: 77
End: 2022-03-17

## 2022-03-17 ENCOUNTER — NURSE ONLY (OUTPATIENT)
Dept: SURGERY | Facility: CLINIC | Age: 77
End: 2022-03-17
Payer: MEDICARE

## 2022-03-17 NOTE — TELEPHONE ENCOUNTER
Patient LVM stating her drain bulb on drain #2 was not staying suctioned. She was advised to come in to the office for drain bulb replacement. She stated she will do so. Inbasket sent to PSRs to add patient to nurse schedule today.

## 2022-03-17 NOTE — PROGRESS NOTES
Patient presents today for drain bulb replacement. She reports her right breast drain #2 is slowly filling with air and not staying suctioned. She underwent a right breast mastectomy by Dr. Yolanda Bae and immediate right breast tissue expander with acellular dermal matrix by Dr. Ozzy Sutherland on 03/14/2022. She has been compliant with compression and activity restrictions. Both drains were stripped and drain dressing was replaced. Demonstrated to patient and her  how to replace drain dressing and how to strip tubing. Drain #2 bulb was replaced and did stay suctioned while she was in office. She was instructed to call us if the suction does not stay or if she develops any fever, chills, swelling, redness, or bruising. She will follow up with [place], [place] on 03/21/2022.

## 2022-03-21 ENCOUNTER — OFFICE VISIT (OUTPATIENT)
Dept: SURGERY | Facility: CLINIC | Age: 77
End: 2022-03-21
Payer: MEDICARE

## 2022-03-21 DIAGNOSIS — Z90.11 ABSENCE OF RIGHT BREAST: Primary | ICD-10-CM

## 2022-03-21 PROCEDURE — 99024 POSTOP FOLLOW-UP VISIT: CPT | Performed by: PHYSICIAN ASSISTANT

## 2022-03-21 NOTE — PROGRESS NOTES
Fany Abbasi is a 78year old female who presents today for a follow-up after right mastectomy with Dr. Bishop Blake and immediate right breast reconstruction with prepectoral tissue expander placement and acellular dermal matrix, 90 mL intraoperative air fill with Dr. Cassidy Ramos on 3/14/2022. She denies fever and chills. She denies nausea, vomiting, diarrhea or constipation. Her pain is controlled. She has been compliant with compression, drain care, taking her antibiotic and activity restrictions. Physical Exam     Breasts: Right breast incision clean, dry and intact without wound drainage or surrounding erythema. There is some epidermolysis just superior to the incision measuring approximately 3 cm wide by 0.5 cm long. Right breast skin is without erythema or necrosis. There is hyperemia to the right mastectomy skin. No evidence of hematoma or seroma. Drain sites clean, dry and intact. Drain effluent serosanguineous. There were no vitals filed for this visit. Assessment and Plan     Fany Abbasi is doing well s/p right mastectomy with Dr. Bishop Blake and immediate right breast reconstruction with prepectoral tissue expander placement and acellular dermal matrix, 90 mL intraoperative air fill with Dr. Cassidy Ramos on 3/14/2022. One of the right breast drains was removed today due to low output. Neosporin, gauze, Tegaderm was placed. A new drain dressing was placed on the remaining drain. The right breast Steri-Strip was removed. A dressing of Neosporin, Xeroform, gauze was placed. I recommend she change this daily. She will continue with compression, activity restrictions and drain care. We reviewed parameters for drain removal and she will let us know when this drain is ready for removal.  She will keep taking her antibiotic until her drain is removed. We reviewed reasons to contact our office. She will follow-up when her drain is ready for removal and in 2 weeks with Dr. Cassidy Ramos.   She reports she does not need any further oncologic treatment and is interested in implant-based reconstruction. Questions were answered. Patient understands.      Eun Becerra  3/21/2022  10:45 AM

## 2022-03-22 ENCOUNTER — NURSE NAVIGATOR ENCOUNTER (OUTPATIENT)
Dept: HEMATOLOGY/ONCOLOGY | Facility: HOSPITAL | Age: 77
End: 2022-03-22

## 2022-03-22 NOTE — PROGRESS NOTES
Spoke with patient regarding plan of care. Pt met with  Via Murali Hoff today. She states she is doing well. She sees Dr. Godfrey Organ office on Friday. Offered to assist patient in scheduling with Dr. Jimmy Mills, pt declines and states that she will call the cancer center to schedule this appointment.

## 2022-03-25 ENCOUNTER — OFFICE VISIT (OUTPATIENT)
Dept: SURGERY | Facility: CLINIC | Age: 77
End: 2022-03-25
Payer: MEDICARE

## 2022-03-25 DIAGNOSIS — Z90.11 ABSENCE OF RIGHT BREAST: Primary | ICD-10-CM

## 2022-03-25 PROCEDURE — 99024 POSTOP FOLLOW-UP VISIT: CPT | Performed by: SURGERY

## 2022-03-25 NOTE — PROGRESS NOTES
Radha Issa is a 78year old female who presents today for a follow-up. She denies fever and chills. She denies nausea, vomiting, diarrhea or constipation. Physical Examination:  Breasts: The right breast incision is noted to have marginal epidermal lysis measuring proxy 1 cm in diameter. The skin is of indeterminate viability. Moderate hyperemia of the surrounding skin is noted. There is no erythema or seroma noted. Drain output is serous. Assessment and Plan:  The patient was instructed continue local care to the right breast wound with topical antibiotic ointment application daily. We discussed allowing the tissues demarcate and likely debridement of skin under local anesthesia at her next visit. The patient's remaining drain was removed today. She may discontinue her oral antibiotic prophylaxis. She will follow-up in 10 days for reevaluation. The plan was reviewed with the patient and questions were answered.

## 2022-04-05 ENCOUNTER — OFFICE VISIT (OUTPATIENT)
Dept: SURGERY | Facility: CLINIC | Age: 77
End: 2022-04-05
Payer: MEDICARE

## 2022-04-05 DIAGNOSIS — Z90.11 ABSENCE OF RIGHT BREAST: ICD-10-CM

## 2022-04-05 DIAGNOSIS — I96 SKIN NECROSIS (HCC): ICD-10-CM

## 2022-04-05 DIAGNOSIS — C50.411 MALIGNANT NEOPLASM OF UPPER-OUTER QUADRANT OF RIGHT BREAST IN FEMALE, ESTROGEN RECEPTOR POSITIVE (HCC): Primary | ICD-10-CM

## 2022-04-05 DIAGNOSIS — Z17.0 MALIGNANT NEOPLASM OF UPPER-OUTER QUADRANT OF RIGHT BREAST IN FEMALE, ESTROGEN RECEPTOR POSITIVE (HCC): Primary | ICD-10-CM

## 2022-04-05 PROCEDURE — 88305 TISSUE EXAM BY PATHOLOGIST: CPT | Performed by: SURGERY

## 2022-04-05 PROCEDURE — 99024 POSTOP FOLLOW-UP VISIT: CPT | Performed by: SURGERY

## 2022-04-05 NOTE — PROGRESS NOTES
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 Informed consent was obtained. Risk, benefits, and alternatives were reviewed with the patient. She expressed understanding and wished to proceed. The area of necrosis was encompassed in a transverse ellipse and infiltrated with 2.5 cc of 1% lidocaine with epinephrine. The area was prepped and draped sterilely. The ellipse was excised with a scalpel and sent for permanent pathologic analysis. The wound was repaired in layered fashion with interrupted 3-0 Vicryl deep sutures and running 5-0 plain simple suture. Bacitracin, Xeroform, and gauze were applied. The patient tolerated the procedure well. No complications.

## 2022-04-05 NOTE — PROGRESS NOTES
Rylee Carrasco is a 78year old female who presents today for a follow-up. She denies fever and chills. She denies nausea, vomiting, diarrhea or constipation. Physical Examination:  Breasts: The right breast is noted to have a full-thickness eschar at the superior mastectomy flap which measures less than 1 cm in diameter. There is no purulence, odor, or surrounding erythema. Assessment and Plan:  Mastectomy skin flap necrosis. We discussed debridement and reclosure under local anesthesia. The risk, benefits, and alternatives were reviewed. The patient expresses understanding wishes to proceed.

## 2022-04-13 ENCOUNTER — OFFICE VISIT (OUTPATIENT)
Dept: HEMATOLOGY/ONCOLOGY | Age: 77
End: 2022-04-13
Attending: SPECIALIST
Payer: MEDICARE

## 2022-04-13 VITALS
BODY MASS INDEX: 26.22 KG/M2 | OXYGEN SATURATION: 98 % | DIASTOLIC BLOOD PRESSURE: 81 MMHG | RESPIRATION RATE: 18 BRPM | TEMPERATURE: 99 F | HEART RATE: 77 BPM | WEIGHT: 144.31 LBS | SYSTOLIC BLOOD PRESSURE: 157 MMHG | HEIGHT: 62.01 IN

## 2022-04-13 DIAGNOSIS — Z78.0 OSTEOPENIA AFTER MENOPAUSE: Primary | ICD-10-CM

## 2022-04-13 DIAGNOSIS — M85.80 OSTEOPENIA AFTER MENOPAUSE: Primary | ICD-10-CM

## 2022-04-13 DIAGNOSIS — C50.411 MALIGNANT NEOPLASM OF UPPER-OUTER QUADRANT OF RIGHT BREAST IN FEMALE, ESTROGEN RECEPTOR POSITIVE (HCC): ICD-10-CM

## 2022-04-13 DIAGNOSIS — Z17.0 MALIGNANT NEOPLASM OF UPPER-OUTER QUADRANT OF RIGHT BREAST IN FEMALE, ESTROGEN RECEPTOR POSITIVE (HCC): ICD-10-CM

## 2022-04-13 PROCEDURE — 99211 OFF/OP EST MAY X REQ PHY/QHP: CPT

## 2022-04-13 RX ORDER — ANASTROZOLE 1 MG/1
1 TABLET ORAL DAILY
Qty: 30 TABLET | Refills: 6 | Status: SHIPPED | OUTPATIENT
Start: 2022-04-13

## 2022-04-13 NOTE — PROGRESS NOTES
Patient is here today for follow up with Thad Reed for Breast Cancer. Test Results. Patient denies pain. Medication list and medical history were reviewed and updated. Education Record    Learner:  Patient and spouse    Disease / Diagnosis: Breast Cancer    Barriers / Limitations:  None   Comments:    Method:  Brief focused, Discussion, Printed material and Reinforcement   Comments:    General Topics:  Medication, Pain, Procedure and Plan of care reviewed   Comments:    Outcome:  Shows understanding   Comments:    [facility] notes on Anastrozole given. AVS provided and follow up reviewed. Patient instructed to call as needed.

## 2022-04-15 ENCOUNTER — OFFICE VISIT (OUTPATIENT)
Dept: SURGERY | Facility: CLINIC | Age: 77
End: 2022-04-15
Payer: MEDICARE

## 2022-04-15 DIAGNOSIS — Z90.11 ABSENCE OF RIGHT BREAST: Primary | ICD-10-CM

## 2022-04-15 PROCEDURE — 99024 POSTOP FOLLOW-UP VISIT: CPT | Performed by: PHYSICIAN ASSISTANT

## 2022-04-15 NOTE — PROGRESS NOTES
This is a 81-year-old female that is 4-1/2-week status post her right breast mastectomy with Dr. Kaitlin Nice and immediate breast reconstruction with prepectoral placement of tissue expander, AlloDerm ADM, 90 cc intraoperative air fill by Dr. Marlee Galeazzi. She underwent an in office debridement and reclosure of some mastectomy skin flap necrosis 10 days ago. She is here today for wound check and suture removal.  Denies fevers or signs of infection. Denies nausea vomiting. Denies chest pain, calf pain, shortness of breath. She is been compliant with compression. She has not yet undergone air to saline exchange. Complains of the medial expander edge being uncomfortable. Exam:  The right breast debridement and reclosure site is well-healed without any evidence of infection cellulitis drainage or wound separation  Right mastectomy skin remains without any erythema ecchymosis hyperemia skin breakdown or necrosis  No evidence of hematoma or seroma    Impression:  4-1/2-week status post her right breast mastectomy with Dr. Kaitlin Nice and immediate breast reconstruction with prepectoral placement of tissue expander, AlloDerm ADM, 90 cc intraoperative air fill by Dr. Sneha Prather:  Right breast Prolene sutures were removed today and she tolerated this well. Compression was encouraged. I reviewed the air to saline exchange process with her and we may be able to personally proceed with this next week as long as her incision remains well-healed. She will continue activity restrictions until she is 6 weeks postop. All of her questions were asked and answered. She was offered a consultation with radiation oncology and offered Oncotype testing to determine chemotherapy benefit. She has declined both.

## 2022-04-19 ENCOUNTER — OFFICE VISIT (OUTPATIENT)
Dept: SURGERY | Facility: CLINIC | Age: 77
End: 2022-04-19
Payer: MEDICARE

## 2022-04-19 DIAGNOSIS — Z90.11 ABSENCE OF RIGHT BREAST: Primary | ICD-10-CM

## 2022-04-19 PROCEDURE — 99024 POSTOP FOLLOW-UP VISIT: CPT | Performed by: SURGERY

## 2022-04-19 NOTE — PROGRESS NOTES
Franco Simpson is a 78year old female who presents today for a follow-up. She is without new complaints. Physical Examination:  Breasts: The right breast incision is clean dry and intact. No palpable fluid wave is noted consistent with seroma. Procedure: The right breast tissue expander were sterilely accessed and all air was aspirated. It was filled with 180 cc. 140 cc of clear straw-colored seroma fluid were aspirated. Assessment and Plan:  Patient doing well. She currently has a 13.0 cm base diameter tissue expander filled to 180 cc. She may slowly increase activity compression place. She will follow-up in 1 week for further expansion.

## 2022-04-22 ENCOUNTER — OFFICE VISIT (OUTPATIENT)
Dept: FAMILY MEDICINE CLINIC | Facility: CLINIC | Age: 77
End: 2022-04-22
Payer: MEDICARE

## 2022-04-22 ENCOUNTER — HOSPITAL ENCOUNTER (OUTPATIENT)
Dept: BONE DENSITY | Age: 77
Discharge: HOME | End: 2022-04-22
Attending: SPECIALIST
Payer: MEDICARE

## 2022-04-22 VITALS — WEIGHT: 141 LBS | HEIGHT: 61 IN | BODY MASS INDEX: 26.62 KG/M2 | TEMPERATURE: 98 F

## 2022-04-22 DIAGNOSIS — Z78.0 OSTEOPENIA AFTER MENOPAUSE: ICD-10-CM

## 2022-04-22 DIAGNOSIS — E11.9 DIET-CONTROLLED DIABETES MELLITUS (HCC): ICD-10-CM

## 2022-04-22 DIAGNOSIS — C50.411 MALIGNANT NEOPLASM OF UPPER-OUTER QUADRANT OF RIGHT BREAST IN FEMALE, ESTROGEN RECEPTOR POSITIVE (HCC): ICD-10-CM

## 2022-04-22 DIAGNOSIS — E66.3 OVERWEIGHT (BMI 25.0-29.9): ICD-10-CM

## 2022-04-22 DIAGNOSIS — E78.2 MIXED HYPERLIPIDEMIA: ICD-10-CM

## 2022-04-22 DIAGNOSIS — R42 VERTIGO: ICD-10-CM

## 2022-04-22 DIAGNOSIS — Z17.0 MALIGNANT NEOPLASM OF UPPER-OUTER QUADRANT OF RIGHT BREAST IN FEMALE, ESTROGEN RECEPTOR POSITIVE (HCC): ICD-10-CM

## 2022-04-22 DIAGNOSIS — M85.80 OSTEOPENIA AFTER MENOPAUSE: ICD-10-CM

## 2022-04-22 DIAGNOSIS — M81.0 AGE-RELATED OSTEOPOROSIS WITHOUT CURRENT PATHOLOGICAL FRACTURE: ICD-10-CM

## 2022-04-22 DIAGNOSIS — Z90.11 ABSENCE OF RIGHT BREAST: ICD-10-CM

## 2022-04-22 DIAGNOSIS — Z00.00 ENCOUNTER FOR MEDICARE ANNUAL WELLNESS EXAM: Primary | ICD-10-CM

## 2022-04-22 DIAGNOSIS — H25.9 AGE-RELATED CATARACT OF BOTH EYES, UNSPECIFIED AGE-RELATED CATARACT TYPE: ICD-10-CM

## 2022-04-22 DIAGNOSIS — Z96.653 STATUS POST TOTAL BILATERAL KNEE REPLACEMENT: ICD-10-CM

## 2022-04-22 PROCEDURE — 77080 DXA BONE DENSITY AXIAL: CPT | Performed by: SPECIALIST

## 2022-04-25 ENCOUNTER — OFFICE VISIT (OUTPATIENT)
Dept: SURGERY | Facility: CLINIC | Age: 77
End: 2022-04-25
Payer: MEDICARE

## 2022-04-25 DIAGNOSIS — Z90.11 ABSENCE OF RIGHT BREAST: Primary | ICD-10-CM

## 2022-04-25 PROCEDURE — 99024 POSTOP FOLLOW-UP VISIT: CPT | Performed by: PHYSICIAN ASSISTANT

## 2022-05-02 ENCOUNTER — MED REC SCAN ONLY (OUTPATIENT)
Dept: FAMILY MEDICINE CLINIC | Facility: CLINIC | Age: 77
End: 2022-05-02

## 2022-05-03 ENCOUNTER — HOSPITAL ENCOUNTER (EMERGENCY)
Age: 77
Discharge: HOME | End: 2022-05-03
Attending: EMERGENCY MEDICINE
Payer: MEDICARE

## 2022-05-03 ENCOUNTER — APPOINTMENT (OUTPATIENT)
Dept: CT IMAGING | Age: 77
End: 2022-05-03
Attending: EMERGENCY MEDICINE
Payer: MEDICARE

## 2022-05-03 ENCOUNTER — APPOINTMENT (OUTPATIENT)
Dept: GENERAL RADIOLOGY | Age: 77
End: 2022-05-03
Attending: EMERGENCY MEDICINE
Payer: MEDICARE

## 2022-05-03 ENCOUNTER — TELEPHONE (OUTPATIENT)
Dept: HEMATOLOGY/ONCOLOGY | Facility: HOSPITAL | Age: 77
End: 2022-05-03

## 2022-05-03 VITALS
HEART RATE: 74 BPM | TEMPERATURE: 98 F | SYSTOLIC BLOOD PRESSURE: 151 MMHG | WEIGHT: 141.13 LBS | OXYGEN SATURATION: 98 % | BODY MASS INDEX: 27 KG/M2 | DIASTOLIC BLOOD PRESSURE: 70 MMHG | RESPIRATION RATE: 16 BRPM

## 2022-05-03 DIAGNOSIS — U07.1 COVID-19: Primary | ICD-10-CM

## 2022-05-03 DIAGNOSIS — R42 DIZZINESS: ICD-10-CM

## 2022-05-03 LAB
ALBUMIN SERPL-MCNC: 3.3 G/DL (ref 3.4–5)
ALBUMIN/GLOB SERPL: 0.8 {RATIO} (ref 1–2)
ALP LIVER SERPL-CCNC: 60 U/L
ALT SERPL-CCNC: 23 U/L
ANION GAP SERPL CALC-SCNC: 9 MMOL/L (ref 0–18)
AST SERPL-CCNC: 26 U/L (ref 15–37)
ATRIAL RATE: 81 BPM
BASOPHILS # BLD AUTO: 0.02 X10(3) UL (ref 0–0.2)
BASOPHILS NFR BLD AUTO: 0.6 %
BILIRUB SERPL-MCNC: 0.6 MG/DL (ref 0.1–2)
BILIRUB UR QL STRIP.AUTO: NEGATIVE
BUN BLD-MCNC: 17 MG/DL (ref 7–18)
CALCIUM BLD-MCNC: 9.3 MG/DL (ref 8.5–10.1)
CHLORIDE SERPL-SCNC: 105 MMOL/L (ref 98–112)
CLARITY UR REFRACT.AUTO: CLEAR
CO2 SERPL-SCNC: 24 MMOL/L (ref 21–32)
COLOR UR AUTO: YELLOW
CREAT BLD-MCNC: 0.69 MG/DL
EOSINOPHIL # BLD AUTO: 0.02 X10(3) UL (ref 0–0.7)
EOSINOPHIL NFR BLD AUTO: 0.6 %
ERYTHROCYTE [DISTWIDTH] IN BLOOD BY AUTOMATED COUNT: 11.9 %
GLOBULIN PLAS-MCNC: 4 G/DL (ref 2.8–4.4)
GLUCOSE BLD-MCNC: 103 MG/DL (ref 70–99)
GLUCOSE UR STRIP.AUTO-MCNC: NEGATIVE MG/DL
HCT VFR BLD AUTO: 37.4 %
HGB BLD-MCNC: 12.8 G/DL
IMM GRANULOCYTES # BLD AUTO: 0.01 X10(3) UL (ref 0–1)
IMM GRANULOCYTES NFR BLD: 0.3 %
KETONES UR STRIP.AUTO-MCNC: 40 MG/DL
LYMPHOCYTES # BLD AUTO: 0.93 X10(3) UL (ref 1–4)
LYMPHOCYTES NFR BLD AUTO: 26.6 %
MCH RBC QN AUTO: 31.4 PG (ref 26–34)
MCHC RBC AUTO-ENTMCNC: 34.2 G/DL (ref 31–37)
MCV RBC AUTO: 91.7 FL
MONOCYTES # BLD AUTO: 0.25 X10(3) UL (ref 0.1–1)
MONOCYTES NFR BLD AUTO: 7.2 %
NEUTROPHILS # BLD AUTO: 2.26 X10 (3) UL (ref 1.5–7.7)
NEUTROPHILS # BLD AUTO: 2.26 X10(3) UL (ref 1.5–7.7)
NEUTROPHILS NFR BLD AUTO: 64.7 %
NITRITE UR QL STRIP.AUTO: NEGATIVE
OSMOLALITY SERPL CALC.SUM OF ELEC: 288 MOSM/KG (ref 275–295)
P AXIS: 53 DEGREES
P-R INTERVAL: 182 MS
PH UR STRIP.AUTO: 7.5 [PH] (ref 5–8)
PLATELET # BLD AUTO: 219 10(3)UL (ref 150–450)
POTASSIUM SERPL-SCNC: 3.7 MMOL/L (ref 3.5–5.1)
PROT SERPL-MCNC: 7.3 G/DL (ref 6.4–8.2)
PROT UR STRIP.AUTO-MCNC: NEGATIVE MG/DL
Q-T INTERVAL: 404 MS
QRS DURATION: 86 MS
QTC CALCULATION (BEZET): 469 MS
R AXIS: 47 DEGREES
RBC # BLD AUTO: 4.08 X10(6)UL
RBC UR QL AUTO: NEGATIVE
SARS-COV-2 RNA RESP QL NAA+PROBE: DETECTED
SODIUM SERPL-SCNC: 138 MMOL/L (ref 136–145)
SP GR UR STRIP.AUTO: 1.02 (ref 1–1.03)
T AXIS: 53 DEGREES
TROPONIN I HIGH SENSITIVITY: 5 NG/L
UROBILINOGEN UR STRIP.AUTO-MCNC: 0.2 MG/DL
VENTRICULAR RATE: 81 BPM
WBC # BLD AUTO: 3.5 X10(3) UL (ref 4–11)

## 2022-05-03 PROCEDURE — 93005 ELECTROCARDIOGRAM TRACING: CPT

## 2022-05-03 PROCEDURE — 96375 TX/PRO/DX INJ NEW DRUG ADDON: CPT

## 2022-05-03 PROCEDURE — 99285 EMERGENCY DEPT VISIT HI MDM: CPT

## 2022-05-03 PROCEDURE — 87086 URINE CULTURE/COLONY COUNT: CPT | Performed by: EMERGENCY MEDICINE

## 2022-05-03 PROCEDURE — 93010 ELECTROCARDIOGRAM REPORT: CPT

## 2022-05-03 PROCEDURE — 99284 EMERGENCY DEPT VISIT MOD MDM: CPT

## 2022-05-03 PROCEDURE — 71045 X-RAY EXAM CHEST 1 VIEW: CPT | Performed by: EMERGENCY MEDICINE

## 2022-05-03 PROCEDURE — 70450 CT HEAD/BRAIN W/O DYE: CPT | Performed by: EMERGENCY MEDICINE

## 2022-05-03 PROCEDURE — 85025 COMPLETE CBC W/AUTO DIFF WBC: CPT | Performed by: EMERGENCY MEDICINE

## 2022-05-03 PROCEDURE — 80053 COMPREHEN METABOLIC PANEL: CPT

## 2022-05-03 PROCEDURE — 81015 MICROSCOPIC EXAM OF URINE: CPT | Performed by: EMERGENCY MEDICINE

## 2022-05-03 PROCEDURE — 85025 COMPLETE CBC W/AUTO DIFF WBC: CPT

## 2022-05-03 PROCEDURE — 80053 COMPREHEN METABOLIC PANEL: CPT | Performed by: EMERGENCY MEDICINE

## 2022-05-03 PROCEDURE — 96361 HYDRATE IV INFUSION ADD-ON: CPT

## 2022-05-03 PROCEDURE — 96374 THER/PROPH/DIAG INJ IV PUSH: CPT

## 2022-05-03 PROCEDURE — 81001 URINALYSIS AUTO W/SCOPE: CPT | Performed by: EMERGENCY MEDICINE

## 2022-05-03 PROCEDURE — 84484 ASSAY OF TROPONIN QUANT: CPT | Performed by: EMERGENCY MEDICINE

## 2022-05-03 RX ORDER — MECLIZINE HYDROCHLORIDE 25 MG/1
25 TABLET ORAL ONCE
Status: COMPLETED | OUTPATIENT
Start: 2022-05-03 | End: 2022-05-03

## 2022-05-03 RX ORDER — MECLIZINE HYDROCHLORIDE 25 MG/1
25 TABLET ORAL 3 TIMES DAILY PRN
Qty: 20 TABLET | Refills: 0 | Status: SHIPPED | OUTPATIENT
Start: 2022-05-03

## 2022-05-03 RX ORDER — LORAZEPAM 2 MG/ML
1 INJECTION INTRAMUSCULAR ONCE
Status: COMPLETED | OUTPATIENT
Start: 2022-05-03 | End: 2022-05-03

## 2022-05-03 RX ORDER — ONDANSETRON 4 MG/1
4 TABLET, ORALLY DISINTEGRATING ORAL EVERY 4 HOURS PRN
Qty: 10 TABLET | Refills: 0 | Status: SHIPPED | OUTPATIENT
Start: 2022-05-03 | End: 2022-05-10

## 2022-05-03 RX ORDER — ONDANSETRON 2 MG/ML
INJECTION INTRAMUSCULAR; INTRAVENOUS
Status: COMPLETED
Start: 2022-05-03 | End: 2022-05-03

## 2022-05-03 RX ORDER — ONDANSETRON 2 MG/ML
4 INJECTION INTRAMUSCULAR; INTRAVENOUS ONCE
Status: COMPLETED | OUTPATIENT
Start: 2022-05-03 | End: 2022-05-03

## 2022-05-03 NOTE — TELEPHONE ENCOUNTER
Royal Streeter and Larry Fang called, she started her Anastrozole yesterday and is having some adverse reactions per spouse. She is having nausea, dizziness, not eating or drinking, dehydration. She also has been battling a sinus infection. Denies fevers, no sob or chest pain, has nausea but no medication at home for it, no vomiting, cough with sinus infection, color white to yellow, cough aggraviating back pain, light headed, no urinary issues, some diarrhea x 3 today, not taking anything for it. No know Covid exposure, is not vaccinated. Encourage to push fluids, eat small frequent meals, BRAT diet. They do no have Imodium for stools or any antiemetics. Spouse wondering if he should take her to ER for fluids or something else. Please advise.

## 2022-05-03 NOTE — ED INITIAL ASSESSMENT (HPI)
C/o sudden onset of dizziness \"room spinning\" with N/V began 2 hrs ago. Recent possible sinus infection. H/o vertigo.  States it feels similar

## 2022-05-04 ENCOUNTER — PATIENT OUTREACH (OUTPATIENT)
Dept: CASE MANAGEMENT | Age: 77
End: 2022-05-04

## 2022-05-04 NOTE — PROGRESS NOTES
1st attempt ED PCP apt request (dc 05/03)    Dr Penelope Ambrose  [address] [facility] [place] [phone]  Pt advised she doesn't want to make an apt at this time  Closing encounter

## 2022-05-05 ENCOUNTER — TELEPHONE (OUTPATIENT)
Dept: FAMILY MEDICINE CLINIC | Facility: CLINIC | Age: 77
End: 2022-05-05

## 2022-05-05 NOTE — TELEPHONE ENCOUNTER
Glad symptoms are mild. We can discontinue monitoring. Patient can call with questions or concerns. Thank you.

## 2022-05-05 NOTE — TELEPHONE ENCOUNTER
Patient tested positive for COVID-19 5/03/2022. She is unvaccinated. Please add to COVID-19 monitoring list. Thank you.

## 2022-05-31 ENCOUNTER — OFFICE VISIT (OUTPATIENT)
Dept: SURGERY | Facility: CLINIC | Age: 77
End: 2022-05-31
Payer: MEDICARE

## 2022-05-31 DIAGNOSIS — Z90.13 ABSENCE OF BREAST, BILATERAL: Primary | ICD-10-CM

## 2022-05-31 DIAGNOSIS — Z90.11 ABSENCE OF RIGHT BREAST: ICD-10-CM

## 2022-05-31 RX ORDER — CLINDAMYCIN HYDROCHLORIDE 300 MG/1
300 CAPSULE ORAL 3 TIMES DAILY
Qty: 15 CAPSULE | Refills: 0 | Status: SHIPPED | OUTPATIENT
Start: 2022-05-31 | End: 2022-06-05

## 2022-05-31 NOTE — PROGRESS NOTES
Dane Mitchell is a 78year old female who presents today for a follow-up. She denies fever and chills. She denies nausea, vomiting, diarrhea or constipation. Physical Examination:  Breasts: The right breast is noted to have mild central erythema. A questionable fluid wave is noted. Procedure: The right breast was sterilely accessed and the expander was filled with 60 cc of saline with total of 300 cc.  20 cc of clear straw-colored seroma fluid were aspirated. Assessment and Plan:  The patient was instructed continue compression and activity limitation. She is given a prescription for oral antibiotics. She instructed to call for fevers, chills, or spreading of the erythema. She will follow-up in 1 week for further assessment. Plan was reviewed with the patient and  and questions were answered.

## 2022-06-14 ENCOUNTER — OFFICE VISIT (OUTPATIENT)
Dept: SURGERY | Facility: CLINIC | Age: 77
End: 2022-06-14
Payer: MEDICARE

## 2022-06-14 DIAGNOSIS — Z90.11 ABSENCE OF RIGHT BREAST: Primary | ICD-10-CM

## 2022-06-14 PROCEDURE — 99024 POSTOP FOLLOW-UP VISIT: CPT | Performed by: SURGERY

## 2022-06-14 NOTE — PROGRESS NOTES
Dane Mitchell is a 78year old female who presents today for a follow-up. She completed her prescribed course of oral antibiotics. She denies fever and chills. Physical Examination:  Breasts: The right breast is noted to have mild but improved erythema. Procedure: The right breast sterilely accessed and filled with 30 cc of saline with total of 330 cc. 10 cc of straw-colored seroma fluid were aspirated. Assessment and Plan:  Patient is doing well. We discussed options for contralateral balancing procedure including doing nothing, mastopexy, or augmentation mastopexy. The patient wished to proceed with augmentation mastopexy. As such I recommended that she undergo further expansion of the right breast.  She will follow-up in 1 week for further expansion. In the interim, the patient was  instructed to call for fevers, chills, or spreading erythema.

## 2022-06-21 ENCOUNTER — OFFICE VISIT (OUTPATIENT)
Dept: SURGERY | Facility: CLINIC | Age: 77
End: 2022-06-21
Payer: MEDICARE

## 2022-06-21 DIAGNOSIS — Z90.11 ABSENCE OF RIGHT BREAST: Primary | ICD-10-CM

## 2022-06-21 PROCEDURE — 99212 OFFICE O/P EST SF 10 MIN: CPT | Performed by: SURGERY

## 2022-06-21 NOTE — PROGRESS NOTES
Dorothy Baum is a 78year old female who presents today for a follow-up. She is without new complaints. She relates discomfort following her last expansion and does not wish to undergo any further expansion. She currently has a 13 cm base diameter tissue expander filled to 330 cc. Physical Examination:  Breasts: The right breast is noted to have a questionable fluid wave. This was sterilely aspirated overlying the port and no seroma fluid was encountered. The left breast is noted to have grade 2 ptosis with a sternal notch nipple measurement of 24 cm, nipple to inframammary fold measurement of 7.5 cm, base diameter 14 cm. There are no palpable left breast masses noted. There is no axillary lymphadenopathy noted bilaterally. Abdomen: Multiple well-healed laparoscopic port sites are noted. There are no palpable hernias noted. Moderate central abdominal and flank lipodystrophy is noted. Assessment and Plan:  We discussed the plan for the second stage which will include removal of the right breast tissue expanders placement of a smooth, round, silicone implants, fat grafting to to the right reconstructed breast, and a left balancing augmentation mastopexy with a submuscular smooth silicone implant. The nature of the procedure was reviewed with the patient and . The shape of the Cosby pattern scarring was demonstrated. .  We discussed the risks of surgery including but not limited to bleeding, infection, scarring, delayed wound healing, loss of nipple sensation, nipple areolar necrosis, asymmetry, implant infection or extrusion requiring removal, ALCL, capsular contracture, hypertrophic scarring or keloid, injury to intra-abdominal structures, contour abnormalities, cysts or calcifications requiring biopsy, and need for further surgery. We reviewed the expected postoperative course including possible need for drains, as well as need for activity limitation and compression.   Multiple questions were answered the patient's satisfaction. No guarantees as to outcome were offered. The patient expresses understanding and wishes to proceed.

## 2022-06-24 ENCOUNTER — TELEPHONE (OUTPATIENT)
Dept: SURGERY | Facility: CLINIC | Age: 77
End: 2022-06-24

## 2022-06-24 ENCOUNTER — TELEPHONE (OUTPATIENT)
Dept: HEMATOLOGY/ONCOLOGY | Facility: HOSPITAL | Age: 77
End: 2022-06-24

## 2022-06-24 NOTE — TELEPHONE ENCOUNTER
Calling pt in regards to scheduling surgery. Informed pt that I have 09/28/2022 available at [facility] with Dr. Rian Dutta. Pt verbalized understanding and in agreement with date and location. All questions answered. Encouraged pt to call or [employer] message office with any other questions or concerns.

## 2022-06-27 DIAGNOSIS — Z90.11 ABSENCE OF RIGHT BREAST: Primary | ICD-10-CM

## 2022-07-05 ENCOUNTER — TELEPHONE (OUTPATIENT)
Dept: FAMILY MEDICINE CLINIC | Facility: CLINIC | Age: 77
End: 2022-07-05

## 2022-07-05 DIAGNOSIS — E11.9 DIET-CONTROLLED DIABETES MELLITUS (HCC): ICD-10-CM

## 2022-07-05 DIAGNOSIS — C50.411 MALIGNANT NEOPLASM OF UPPER-OUTER QUADRANT OF RIGHT BREAST IN FEMALE, ESTROGEN RECEPTOR POSITIVE (HCC): ICD-10-CM

## 2022-07-05 DIAGNOSIS — Z17.0 MALIGNANT NEOPLASM OF UPPER-OUTER QUADRANT OF RIGHT BREAST IN FEMALE, ESTROGEN RECEPTOR POSITIVE (HCC): ICD-10-CM

## 2022-07-05 DIAGNOSIS — Z01.818 PREOPERATIVE EXAMINATION: ICD-10-CM

## 2022-07-05 DIAGNOSIS — Z90.11 ABSENCE OF RIGHT BREAST: Primary | ICD-10-CM

## 2022-07-05 NOTE — TELEPHONE ENCOUNTER
Received request from Dr Clemente Mccord that pt will be having Removal of rt breast tissue expander with placement of permanent implant and left breast augmentation with mastopexy. Autologous fat grafting to the rt breast on 9/28/22 at [facility].  Pt will need a CBC, CMP, and EKG. Pt has been scheduled. While on phone being scheduled pt asked if EKG could be completed in office at visit. Called to pt and advised her that the labs and EKG have been placed and she can get them done at the lab in our building. Pt voiced understanding and agreed to plan.

## 2022-09-08 ENCOUNTER — EKG ENCOUNTER (OUTPATIENT)
Dept: LAB | Age: 77
End: 2022-09-08
Attending: NURSE PRACTITIONER
Payer: MEDICARE

## 2022-09-08 ENCOUNTER — OFFICE VISIT (OUTPATIENT)
Dept: FAMILY MEDICINE CLINIC | Facility: CLINIC | Age: 77
End: 2022-09-08
Payer: MEDICARE

## 2022-09-08 ENCOUNTER — LAB ENCOUNTER (OUTPATIENT)
Dept: LAB | Age: 77
End: 2022-09-08
Attending: NURSE PRACTITIONER
Payer: MEDICARE

## 2022-09-08 VITALS
TEMPERATURE: 98 F | DIASTOLIC BLOOD PRESSURE: 62 MMHG | WEIGHT: 137 LBS | RESPIRATION RATE: 16 BRPM | HEIGHT: 61.42 IN | HEART RATE: 56 BPM | BODY MASS INDEX: 25.53 KG/M2 | SYSTOLIC BLOOD PRESSURE: 104 MMHG

## 2022-09-08 DIAGNOSIS — Z01.818 PREOPERATIVE CLEARANCE: Primary | ICD-10-CM

## 2022-09-08 DIAGNOSIS — E11.9 DIET-CONTROLLED DIABETES MELLITUS (HCC): ICD-10-CM

## 2022-09-08 DIAGNOSIS — M85.80 OSTEOPENIA AFTER MENOPAUSE: ICD-10-CM

## 2022-09-08 DIAGNOSIS — Z78.0 OSTEOPENIA AFTER MENOPAUSE: ICD-10-CM

## 2022-09-08 DIAGNOSIS — Z86.16 HISTORY OF COVID-19: ICD-10-CM

## 2022-09-08 DIAGNOSIS — H61.23 BILATERAL IMPACTED CERUMEN: ICD-10-CM

## 2022-09-08 DIAGNOSIS — C50.411 MALIGNANT NEOPLASM OF UPPER-OUTER QUADRANT OF RIGHT BREAST IN FEMALE, ESTROGEN RECEPTOR POSITIVE (HCC): ICD-10-CM

## 2022-09-08 DIAGNOSIS — Z01.818 PREOPERATIVE CLEARANCE: ICD-10-CM

## 2022-09-08 DIAGNOSIS — E78.2 MIXED HYPERLIPIDEMIA: ICD-10-CM

## 2022-09-08 DIAGNOSIS — Z17.0 MALIGNANT NEOPLASM OF UPPER-OUTER QUADRANT OF RIGHT BREAST IN FEMALE, ESTROGEN RECEPTOR POSITIVE (HCC): ICD-10-CM

## 2022-09-08 DIAGNOSIS — Z78.0 POSTMENOPAUSAL: ICD-10-CM

## 2022-09-08 DIAGNOSIS — E66.3 OVERWEIGHT (BMI 25.0-29.9): ICD-10-CM

## 2022-09-08 DIAGNOSIS — Z90.11 ABSENCE OF RIGHT BREAST: ICD-10-CM

## 2022-09-08 LAB
ALBUMIN SERPL-MCNC: 3.9 G/DL (ref 3.4–5)
ALBUMIN/GLOB SERPL: 1.1 {RATIO} (ref 1–2)
ALP LIVER SERPL-CCNC: 54 U/L
ALT SERPL-CCNC: 23 U/L
ANION GAP SERPL CALC-SCNC: 7 MMOL/L (ref 0–18)
AST SERPL-CCNC: 23 U/L (ref 15–37)
BASOPHILS # BLD AUTO: 0.04 X10(3) UL (ref 0–0.2)
BASOPHILS NFR BLD AUTO: 1.3 %
BILIRUB SERPL-MCNC: 0.9 MG/DL (ref 0.1–2)
BUN BLD-MCNC: 19 MG/DL (ref 7–18)
BUN/CREAT SERPL: 23.8 (ref 10–20)
CALCIUM BLD-MCNC: 9.8 MG/DL (ref 8.5–10.1)
CHLORIDE SERPL-SCNC: 107 MMOL/L (ref 98–112)
CO2 SERPL-SCNC: 26 MMOL/L (ref 21–32)
CREAT BLD-MCNC: 0.8 MG/DL
DEPRECATED RDW RBC AUTO: 43.4 FL (ref 35.1–46.3)
EOSINOPHIL # BLD AUTO: 0.03 X10(3) UL (ref 0–0.7)
EOSINOPHIL NFR BLD AUTO: 1 %
ERYTHROCYTE [DISTWIDTH] IN BLOOD BY AUTOMATED COUNT: 12.3 % (ref 11–15)
FASTING STATUS PATIENT QL REPORTED: YES
GFR SERPLBLD BASED ON 1.73 SQ M-ARVRAT: 77 ML/MIN/1.73M2 (ref 60–?)
GLOBULIN PLAS-MCNC: 3.6 G/DL (ref 2.8–4.4)
GLUCOSE BLD-MCNC: 91 MG/DL (ref 70–99)
HCT VFR BLD AUTO: 42.2 %
HGB BLD-MCNC: 13.6 G/DL
IMM GRANULOCYTES # BLD AUTO: 0.01 X10(3) UL (ref 0–1)
IMM GRANULOCYTES NFR BLD: 0.3 %
LYMPHOCYTES # BLD AUTO: 0.94 X10(3) UL (ref 1–4)
LYMPHOCYTES NFR BLD AUTO: 29.9 %
MCH RBC QN AUTO: 31 PG (ref 26–34)
MCHC RBC AUTO-ENTMCNC: 32.2 G/DL (ref 31–37)
MCV RBC AUTO: 96.1 FL
MONOCYTES # BLD AUTO: 0.25 X10(3) UL (ref 0.1–1)
MONOCYTES NFR BLD AUTO: 8 %
NEUTROPHILS # BLD AUTO: 1.87 X10 (3) UL (ref 1.5–7.7)
NEUTROPHILS # BLD AUTO: 1.87 X10(3) UL (ref 1.5–7.7)
NEUTROPHILS NFR BLD AUTO: 59.5 %
OSMOLALITY SERPL CALC.SUM OF ELEC: 292 MOSM/KG (ref 275–295)
PLATELET # BLD AUTO: 246 10(3)UL (ref 150–450)
POTASSIUM SERPL-SCNC: 4.2 MMOL/L (ref 3.5–5.1)
PROT SERPL-MCNC: 7.5 G/DL (ref 6.4–8.2)
RBC # BLD AUTO: 4.39 X10(6)UL
SODIUM SERPL-SCNC: 140 MMOL/L (ref 136–145)
WBC # BLD AUTO: 3.1 X10(3) UL (ref 4–11)

## 2022-09-08 PROCEDURE — 93005 ELECTROCARDIOGRAM TRACING: CPT

## 2022-09-08 PROCEDURE — 85025 COMPLETE CBC W/AUTO DIFF WBC: CPT

## 2022-09-08 PROCEDURE — 93010 ELECTROCARDIOGRAM REPORT: CPT | Performed by: INTERNAL MEDICINE

## 2022-09-08 PROCEDURE — 3074F SYST BP LT 130 MM HG: CPT | Performed by: NURSE PRACTITIONER

## 2022-09-08 PROCEDURE — 3078F DIAST BP <80 MM HG: CPT | Performed by: NURSE PRACTITIONER

## 2022-09-08 PROCEDURE — 80053 COMPREHEN METABOLIC PANEL: CPT

## 2022-09-08 PROCEDURE — 3008F BODY MASS INDEX DOCD: CPT | Performed by: NURSE PRACTITIONER

## 2022-09-08 PROCEDURE — 99213 OFFICE O/P EST LOW 20 MIN: CPT | Performed by: NURSE PRACTITIONER

## 2022-09-09 LAB
ATRIAL RATE: 51 BPM
P AXIS: 81 DEGREES
P-R INTERVAL: 182 MS
Q-T INTERVAL: 440 MS
QRS DURATION: 84 MS
QTC CALCULATION (BEZET): 405 MS
R AXIS: 31 DEGREES
T AXIS: 71 DEGREES
VENTRICULAR RATE: 51 BPM

## 2022-09-21 ENCOUNTER — TELEPHONE (OUTPATIENT)
Dept: SURGERY | Facility: CLINIC | Age: 77
End: 2022-09-21

## 2022-09-21 NOTE — TELEPHONE ENCOUNTER
Patient called to discuss pre-surgical questions. We discussed discontinuing all blood thinners and herbal supplements as of tomorrow. Patient asked about the Gatorade and Tylenol protocol. I informed patient that is anesthesia's protocol so she can ask PAT when they call her. Patient was appreciative of the call.

## 2022-09-23 ENCOUNTER — TELEPHONE (OUTPATIENT)
Dept: SURGERY | Facility: CLINIC | Age: 77
End: 2022-09-23

## 2022-09-23 NOTE — TELEPHONE ENCOUNTER
----- Message -----  From: Lore Lou MD  Sent: 9/23/2022   3:09 PM CDT  To: Lynette Ritchie RN, Mitesh Werner RN  Subject: RE: WBC lab                                      Yes.  ----- Message -----  From: Vaibhav Reagan RN  Sent: 9/23/2022   3:02 PM CDT  To: Simone Ayala MD, Lynette Ritchie RN  Subject: WBC lab                                          Hi Mona Stern is having surgery on Wednesday with Dr. Kenya Proctor. Her PCP cleared her for surgery but her most recent WBC was 3.1 when her last one in May was 3.5. Are you ok proceeding with surgery?     Thanks  SAMEERA Grider  Plastic and Reconstructive Surgery

## 2022-09-23 NOTE — PAT NURSING NOTE
WBC 3.1 office notified and Dr Mary Ellen Colón notified and will continue wit procedure.  Letter faxed

## 2022-09-26 ENCOUNTER — LAB ENCOUNTER (OUTPATIENT)
Dept: LAB | Age: 77
End: 2022-09-26
Attending: SURGERY

## 2022-09-26 DIAGNOSIS — Z01.812 ENCOUNTER FOR PREOPERATIVE SCREENING LABORATORY TESTING FOR COVID-19 VIRUS: ICD-10-CM

## 2022-09-26 DIAGNOSIS — Z20.822 ENCOUNTER FOR PREOPERATIVE SCREENING LABORATORY TESTING FOR COVID-19 VIRUS: ICD-10-CM

## 2022-09-27 ENCOUNTER — ANESTHESIA EVENT (OUTPATIENT)
Dept: SURGERY | Facility: HOSPITAL | Age: 77
End: 2022-09-27
Payer: MEDICARE

## 2022-09-27 LAB — SARS-COV-2 RNA RESP QL NAA+PROBE: NOT DETECTED

## 2022-09-28 ENCOUNTER — HOSPITAL ENCOUNTER (OUTPATIENT)
Facility: HOSPITAL | Age: 77
Setting detail: HOSPITAL OUTPATIENT SURGERY
Discharge: HOME | End: 2022-09-28
Attending: SURGERY | Admitting: SURGERY
Payer: MEDICARE

## 2022-09-28 ENCOUNTER — ANESTHESIA (OUTPATIENT)
Dept: SURGERY | Facility: HOSPITAL | Age: 77
End: 2022-09-28
Payer: MEDICARE

## 2022-09-28 VITALS
HEIGHT: 61 IN | BODY MASS INDEX: 26.24 KG/M2 | SYSTOLIC BLOOD PRESSURE: 126 MMHG | TEMPERATURE: 98 F | RESPIRATION RATE: 13 BRPM | WEIGHT: 139 LBS | OXYGEN SATURATION: 95 % | HEART RATE: 92 BPM | DIASTOLIC BLOOD PRESSURE: 59 MMHG

## 2022-09-28 DIAGNOSIS — Z01.812 ENCOUNTER FOR PREOPERATIVE SCREENING LABORATORY TESTING FOR COVID-19 VIRUS: Primary | ICD-10-CM

## 2022-09-28 DIAGNOSIS — Z20.822 ENCOUNTER FOR PREOPERATIVE SCREENING LABORATORY TESTING FOR COVID-19 VIRUS: Primary | ICD-10-CM

## 2022-09-28 DIAGNOSIS — Z90.11 ABSENCE OF RIGHT BREAST: ICD-10-CM

## 2022-09-28 PROCEDURE — 0HUU0JZ SUPPLEMENT LEFT BREAST WITH SYNTHETIC SUBSTITUTE, OPEN APPROACH: ICD-10-PCS | Performed by: SURGERY

## 2022-09-28 PROCEDURE — 0JD83ZZ EXTRACTION OF ABDOMEN SUBCUTANEOUS TISSUE AND FASCIA, PERCUTANEOUS APPROACH: ICD-10-PCS | Performed by: SURGERY

## 2022-09-28 PROCEDURE — 0HRT0JZ REPLACEMENT OF RIGHT BREAST WITH SYNTHETIC SUBSTITUTE, OPEN APPROACH: ICD-10-PCS | Performed by: SURGERY

## 2022-09-28 PROCEDURE — 88305 TISSUE EXAM BY PATHOLOGIST: CPT | Performed by: SURGERY

## 2022-09-28 PROCEDURE — 0HQU0ZZ REPAIR LEFT BREAST, OPEN APPROACH: ICD-10-PCS | Performed by: SURGERY

## 2022-09-28 PROCEDURE — 0HPT0NZ REMOVAL OF TISSUE EXPANDER FROM RIGHT BREAST, OPEN APPROACH: ICD-10-PCS | Performed by: SURGERY

## 2022-09-28 PROCEDURE — 0HUT37Z SUPPLEMENT RIGHT BREAST WITH AUTOLOGOUS TISSUE SUBSTITUTE, PERCUTANEOUS APPROACH: ICD-10-PCS | Performed by: SURGERY

## 2022-09-28 DEVICE — NATRELLE INSPIRA SSM 375CC MODERATE PROFILE  SMOOTH ROUND SILICONE
Type: IMPLANTABLE DEVICE | Status: FUNCTIONAL
Brand: NATRELLE INSPIRA SOFTTOUCH BREAST IMPLANTS

## 2022-09-28 DEVICE — NATRELLE INSPIRA SSL 170CC LOW PROFILE  SMOOTH ROUND SILICONE
Type: IMPLANTABLE DEVICE | Status: FUNCTIONAL
Brand: NATRELLE INSPIRA SOFTTOUCH BREAST IMPLANTS

## 2022-09-28 RX ORDER — LIDOCAINE HYDROCHLORIDE AND EPINEPHRINE 10; 10 MG/ML; UG/ML
INJECTION, SOLUTION INFILTRATION; PERINEURAL AS NEEDED
Status: DISCONTINUED | OUTPATIENT
Start: 2022-09-28 | End: 2022-09-28 | Stop reason: HOSPADM

## 2022-09-28 RX ORDER — CLINDAMYCIN HYDROCHLORIDE 300 MG/1
300 CAPSULE ORAL 3 TIMES DAILY
Qty: 15 CAPSULE | Refills: 0 | Status: SHIPPED | OUTPATIENT
Start: 2022-09-28 | End: 2022-10-08

## 2022-09-28 RX ORDER — METOCLOPRAMIDE HYDROCHLORIDE 5 MG/ML
10 INJECTION INTRAMUSCULAR; INTRAVENOUS EVERY 8 HOURS PRN
Status: DISCONTINUED | OUTPATIENT
Start: 2022-09-28 | End: 2022-09-28

## 2022-09-28 RX ORDER — CEFAZOLIN SODIUM/WATER 2 G/20 ML
2 SYRINGE (ML) INTRAVENOUS ONCE
Status: COMPLETED | OUTPATIENT
Start: 2022-09-28 | End: 2022-09-28

## 2022-09-28 RX ORDER — EPHEDRINE SULFATE 50 MG/ML
INJECTION INTRAVENOUS AS NEEDED
Status: DISCONTINUED | OUTPATIENT
Start: 2022-09-28 | End: 2022-09-28 | Stop reason: SURG

## 2022-09-28 RX ORDER — NICOTINE POLACRILEX 4 MG
30 LOZENGE BUCCAL
Status: DISCONTINUED | OUTPATIENT
Start: 2022-09-28 | End: 2022-09-28

## 2022-09-28 RX ORDER — METOCLOPRAMIDE HYDROCHLORIDE 5 MG/ML
INJECTION INTRAMUSCULAR; INTRAVENOUS AS NEEDED
Status: DISCONTINUED | OUTPATIENT
Start: 2022-09-28 | End: 2022-09-28 | Stop reason: SURG

## 2022-09-28 RX ORDER — LIDOCAINE HYDROCHLORIDE 10 MG/ML
INJECTION, SOLUTION EPIDURAL; INFILTRATION; INTRACAUDAL; PERINEURAL AS NEEDED
Status: DISCONTINUED | OUTPATIENT
Start: 2022-09-28 | End: 2022-09-28 | Stop reason: SURG

## 2022-09-28 RX ORDER — MIDAZOLAM HYDROCHLORIDE 1 MG/ML
1 INJECTION INTRAMUSCULAR; INTRAVENOUS EVERY 5 MIN PRN
Status: DISCONTINUED | OUTPATIENT
Start: 2022-09-28 | End: 2022-09-28

## 2022-09-28 RX ORDER — ACETAMINOPHEN 500 MG
1000 TABLET ORAL ONCE AS NEEDED
Status: COMPLETED | OUTPATIENT
Start: 2022-09-28 | End: 2022-09-28

## 2022-09-28 RX ORDER — DEXTROSE MONOHYDRATE 25 G/50ML
50 INJECTION, SOLUTION INTRAVENOUS
Status: DISCONTINUED | OUTPATIENT
Start: 2022-09-28 | End: 2022-09-28

## 2022-09-28 RX ORDER — HYDROCODONE BITARTRATE AND ACETAMINOPHEN 10; 325 MG/1; MG/1
1 TABLET ORAL ONCE AS NEEDED
Status: COMPLETED | OUTPATIENT
Start: 2022-09-28 | End: 2022-09-28

## 2022-09-28 RX ORDER — KETAMINE HYDROCHLORIDE 50 MG/ML
INJECTION, SOLUTION, CONCENTRATE INTRAMUSCULAR; INTRAVENOUS AS NEEDED
Status: DISCONTINUED | OUTPATIENT
Start: 2022-09-28 | End: 2022-09-28 | Stop reason: SURG

## 2022-09-28 RX ORDER — HYDROCODONE BITARTRATE AND ACETAMINOPHEN 10; 325 MG/1; MG/1
2 TABLET ORAL ONCE AS NEEDED
Status: COMPLETED | OUTPATIENT
Start: 2022-09-28 | End: 2022-09-28

## 2022-09-28 RX ORDER — NEOSTIGMINE METHYLSULFATE 1 MG/ML
INJECTION, SOLUTION INTRAVENOUS AS NEEDED
Status: DISCONTINUED | OUTPATIENT
Start: 2022-09-28 | End: 2022-09-28 | Stop reason: SURG

## 2022-09-28 RX ORDER — LABETALOL HYDROCHLORIDE 5 MG/ML
5 INJECTION, SOLUTION INTRAVENOUS EVERY 5 MIN PRN
Status: DISCONTINUED | OUTPATIENT
Start: 2022-09-28 | End: 2022-09-28

## 2022-09-28 RX ORDER — HYDROMORPHONE HYDROCHLORIDE 1 MG/ML
0.4 INJECTION, SOLUTION INTRAMUSCULAR; INTRAVENOUS; SUBCUTANEOUS EVERY 5 MIN PRN
Status: DISCONTINUED | OUTPATIENT
Start: 2022-09-28 | End: 2022-09-28

## 2022-09-28 RX ORDER — NALOXONE HYDROCHLORIDE 0.4 MG/ML
80 INJECTION, SOLUTION INTRAMUSCULAR; INTRAVENOUS; SUBCUTANEOUS AS NEEDED
Status: DISCONTINUED | OUTPATIENT
Start: 2022-09-28 | End: 2022-09-28

## 2022-09-28 RX ORDER — SODIUM CHLORIDE, SODIUM LACTATE, POTASSIUM CHLORIDE, CALCIUM CHLORIDE 600; 310; 30; 20 MG/100ML; MG/100ML; MG/100ML; MG/100ML
INJECTION, SOLUTION INTRAVENOUS CONTINUOUS
Status: DISCONTINUED | OUTPATIENT
Start: 2022-09-28 | End: 2022-09-28

## 2022-09-28 RX ORDER — ACETAMINOPHEN 500 MG
1000 TABLET ORAL ONCE
Status: DISCONTINUED | OUTPATIENT
Start: 2022-09-28 | End: 2022-09-28 | Stop reason: HOSPADM

## 2022-09-28 RX ORDER — MEPERIDINE HYDROCHLORIDE 25 MG/ML
12.5 INJECTION INTRAMUSCULAR; INTRAVENOUS; SUBCUTANEOUS AS NEEDED
Status: DISCONTINUED | OUTPATIENT
Start: 2022-09-28 | End: 2022-09-28

## 2022-09-28 RX ORDER — DEXAMETHASONE SODIUM PHOSPHATE 4 MG/ML
VIAL (ML) INJECTION AS NEEDED
Status: DISCONTINUED | OUTPATIENT
Start: 2022-09-28 | End: 2022-09-28 | Stop reason: SURG

## 2022-09-28 RX ORDER — NICOTINE POLACRILEX 4 MG
15 LOZENGE BUCCAL
Status: DISCONTINUED | OUTPATIENT
Start: 2022-09-28 | End: 2022-09-28

## 2022-09-28 RX ORDER — DOCUSATE SODIUM 100 MG/1
100 CAPSULE, LIQUID FILLED ORAL 2 TIMES DAILY
Qty: 30 CAPSULE | Refills: 1 | Status: SHIPPED | OUTPATIENT
Start: 2022-09-28

## 2022-09-28 RX ORDER — ROCURONIUM BROMIDE 10 MG/ML
INJECTION, SOLUTION INTRAVENOUS AS NEEDED
Status: DISCONTINUED | OUTPATIENT
Start: 2022-09-28 | End: 2022-09-28 | Stop reason: SURG

## 2022-09-28 RX ORDER — ONDANSETRON 4 MG/1
4 TABLET, FILM COATED ORAL EVERY 8 HOURS PRN
Qty: 12 TABLET | Refills: 0 | Status: SHIPPED | OUTPATIENT
Start: 2022-09-28

## 2022-09-28 RX ORDER — PHENYLEPHRINE HCL 10 MG/ML
VIAL (ML) INJECTION AS NEEDED
Status: DISCONTINUED | OUTPATIENT
Start: 2022-09-28 | End: 2022-09-28 | Stop reason: SURG

## 2022-09-28 RX ORDER — GLYCOPYRROLATE 0.2 MG/ML
INJECTION, SOLUTION INTRAMUSCULAR; INTRAVENOUS AS NEEDED
Status: DISCONTINUED | OUTPATIENT
Start: 2022-09-28 | End: 2022-09-28 | Stop reason: SURG

## 2022-09-28 RX ORDER — HYDROMORPHONE HYDROCHLORIDE 1 MG/ML
0.6 INJECTION, SOLUTION INTRAMUSCULAR; INTRAVENOUS; SUBCUTANEOUS EVERY 5 MIN PRN
Status: DISCONTINUED | OUTPATIENT
Start: 2022-09-28 | End: 2022-09-28

## 2022-09-28 RX ORDER — ONDANSETRON 2 MG/ML
4 INJECTION INTRAMUSCULAR; INTRAVENOUS EVERY 6 HOURS PRN
Status: DISCONTINUED | OUTPATIENT
Start: 2022-09-28 | End: 2022-09-28

## 2022-09-28 RX ORDER — HYDROMORPHONE HYDROCHLORIDE 1 MG/ML
0.2 INJECTION, SOLUTION INTRAMUSCULAR; INTRAVENOUS; SUBCUTANEOUS EVERY 5 MIN PRN
Status: DISCONTINUED | OUTPATIENT
Start: 2022-09-28 | End: 2022-09-28

## 2022-09-28 RX ORDER — TRAMADOL HYDROCHLORIDE 50 MG/1
50 TABLET ORAL EVERY 4 HOURS PRN
Qty: 20 TABLET | Refills: 0 | Status: SHIPPED | OUTPATIENT
Start: 2022-09-28

## 2022-09-28 RX ORDER — ONDANSETRON 2 MG/ML
INJECTION INTRAMUSCULAR; INTRAVENOUS AS NEEDED
Status: DISCONTINUED | OUTPATIENT
Start: 2022-09-28 | End: 2022-09-28 | Stop reason: SURG

## 2022-09-28 RX ORDER — ONDANSETRON 2 MG/ML
INJECTION INTRAMUSCULAR; INTRAVENOUS
Status: COMPLETED
Start: 2022-09-28 | End: 2022-09-28

## 2022-09-28 RX ADMIN — SODIUM CHLORIDE, SODIUM LACTATE, POTASSIUM CHLORIDE, CALCIUM CHLORIDE: 600; 310; 30; 20 INJECTION, SOLUTION INTRAVENOUS at 10:34:00

## 2022-09-28 RX ADMIN — DEXAMETHASONE SODIUM PHOSPHATE 8 MG: 4 MG/ML VIAL (ML) INJECTION at 07:39:00

## 2022-09-28 RX ADMIN — PHENYLEPHRINE HCL 100 MCG: 10 MG/ML VIAL (ML) INJECTION at 08:39:00

## 2022-09-28 RX ADMIN — CEFAZOLIN SODIUM/WATER 2 G: 2 G/20 ML SYRINGE (ML) INTRAVENOUS at 07:47:00

## 2022-09-28 RX ADMIN — GLYCOPYRROLATE 0.4 MG: 0.2 INJECTION, SOLUTION INTRAMUSCULAR; INTRAVENOUS at 10:25:00

## 2022-09-28 RX ADMIN — ROCURONIUM BROMIDE 40 MG: 10 INJECTION, SOLUTION INTRAVENOUS at 07:39:00

## 2022-09-28 RX ADMIN — METOCLOPRAMIDE HYDROCHLORIDE 10 MG: 5 INJECTION INTRAMUSCULAR; INTRAVENOUS at 10:20:00

## 2022-09-28 RX ADMIN — ROCURONIUM BROMIDE 10 MG: 10 INJECTION, SOLUTION INTRAVENOUS at 09:26:00

## 2022-09-28 RX ADMIN — LIDOCAINE HYDROCHLORIDE 50 MG: 10 INJECTION, SOLUTION EPIDURAL; INFILTRATION; INTRACAUDAL; PERINEURAL at 07:39:00

## 2022-09-28 RX ADMIN — PHENYLEPHRINE HCL 100 MCG: 10 MG/ML VIAL (ML) INJECTION at 08:13:00

## 2022-09-28 RX ADMIN — NEOSTIGMINE METHYLSULFATE 4 MG: 1 INJECTION, SOLUTION INTRAVENOUS at 10:25:00

## 2022-09-28 RX ADMIN — EPHEDRINE SULFATE 10 MG: 50 INJECTION INTRAVENOUS at 07:51:00

## 2022-09-28 RX ADMIN — ONDANSETRON 4 MG: 2 INJECTION INTRAMUSCULAR; INTRAVENOUS at 10:20:00

## 2022-09-28 RX ADMIN — KETAMINE HYDROCHLORIDE 20 MG: 50 INJECTION, SOLUTION, CONCENTRATE INTRAMUSCULAR; INTRAVENOUS at 07:39:00

## 2022-09-28 RX ADMIN — PHENYLEPHRINE HCL 100 MCG: 10 MG/ML VIAL (ML) INJECTION at 07:51:00

## 2022-09-28 NOTE — BRIEF OP NOTE
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  Autologous fat grafting to the right breast    Surgeon(s) and Role:     * Jania Estrada MD - Primary    Assistant(s):  Surgical Assistant.: Ivelisse Izaguirre     Surgical Findings: Nl     Specimen: bilateral breast tissue     Estimated Blood Loss: Blood Output: 25 mL (9/28/2022 10:21 AM)        Twan Brooks MD  9/28/2022  10:42 AM

## 2022-09-28 NOTE — ANESTHESIA PROCEDURE NOTES
Airway  Date/Time: 9/28/2022 7:41 AM  Urgency: elective      General Information and Staff    Patient location during procedure: OR  Anesthesiologist: Asif Cheema MD  Performed: anesthesiologist     Indications and Patient Condition  Indications for airway management: anesthesia  Sedation level: deep  Preoxygenated: yes  Patient position: sniffing  Mask difficulty assessment: 1 - vent by mask    Final Airway Details  Final airway type: endotracheal airway      Successful airway: ETT  Cuffed: yes   Successful intubation technique: direct laryngoscopy  Endotracheal tube insertion site: oral  Blade: Paty  Blade size: #4  ETT size (mm): 7.0    Cormack-Lehane Classification: grade I - full view of glottis  Placement verified by: chest auscultation and capnometry   Measured from: lips  ETT to lips (cm): 21  Number of attempts at approach: 1

## 2022-09-28 NOTE — H&P
No change in Yisel Zhu's H&P from 9/8. We reviewed the plan for removal of the right breast tissue expanders placement of a smooth, round, silicone implants, fat grafting to to the right reconstructed breast, and a left balancing augmentation mastopexy with a submuscular smooth silicone implant. We reviewed the risks of surgery including but not limited to bleeding, infection, scarring, delayed wound healing, loss of nipple sensation, nipple areolar necrosis, asymmetry, implant infection or extrusion requiring removal, ALCL, capsular contracture, hypertrophic scarring or keloid, injury to intra-abdominal structures, contour abnormalities, cysts or calcifications requiring biopsy, and need for further surgery. We reviewed the expected postoperative course including possible need for drains, as well as need for activity limitation and compression. Multiple questions were answered the patient's satisfaction. No guarantees as to outcome were offered. The patient expresses understanding and wishes to proceed.

## 2022-09-29 NOTE — OPERATIVE REPORT
[facility] (formerly [facility])    PATIENT'S NAME: Sabiha Doan   ATTENDING PHYSICIAN: Andi Jarrell M.D. OPERATING PHYSICIAN: Andi Jarrell M.D. PATIENT ACCOUNT#:   [de-identified]    LOCATION:  [address] [place] [address] 10  MEDICAL RECORD #:   [id number]       YOB: 1945  ADMISSION DATE:       09/28/2022      OPERATION DATE:  09/28/2022    OPERATIVE REPORT      PREOPERATIVE DIAGNOSIS:  Right breast cancer, status post mastectomy and tissue expander reconstruction. POSTOPERATIVE DIAGNOSIS:  Right breast cancer, status post mastectomy and tissue expander reconstruction. PROCEDURE:    1. Removal of right breast tissue expander. 2.   Right breast capsulotomy. 3.   Placement of silicone gel implant, right breast.  4.   Autologous fat grafting of right reconstructed breast.  5.   Left balancing augmentation mastopexy. ASSISTANT:  ALICE Galvez. ANESTHESIA:  General.    ESTIMATED BLOOD LOSS:  25 mL. COMPLICATIONS:  None. INDICATIONS:  Patient is a 63-year-old female who previously underwent a right skin-sparing mastectomy and tissue expander reconstruction. She completed uncomplicated expansion and now presents for exchange of permanent implant, fat grafting, and a contralateral balancing procedure. FINDINGS:  Right breast reconstructed with Caralee Lemme SoftTouch breast implant, style SSM, reference number MCZ-171, serial number [id number]. Left breast submuscular augmentation, style SSL, Natrelle Inspira SoftTouch breast implant, 170 mL, reference SSL-170, serial number [id number]. OPERATIVE TECHNIQUE:  Informed consent was obtained from the patient. The risks, benefits, and alternatives were reviewed with the patient preoperatively. She expressed understanding and wished to proceed. The patient was marked in the preoperative holding area in the upright position. The midline inframammary folds were marked.   The left breast meridian was designed as was the proposed circumvertical mastopexy pattern. Areas of lipodystrophy in the upper and lateral abdomen were marked for liposuction. The patient was then taken to the operating room, properly identified, placed in the supine position. Sequential compression devices were placed on bilateral lower extremities. Intravenous antibiotic prophylaxis was administered. The patient then underwent successful induction of general anesthesia and endotracheal intubation. The arms were placed abducted on foam-padded cradles and loosely secured with Kerlix. The chest and abdomen were prepped and draped sterilely. The proposed liposuction port sites in the lateral abdomen were infiltrated with 1% lidocaine with epinephrine. Stab incisions were then created and approximately 800 mL of tumescent solution was infiltrated in the upper and lateral abdomen. Attention was then turned to the right breast.  The skin envelope was inspected. Significant thinning of the central skin was noted. As such, decision was made to proceed with an inframammary approach which was marked. The area was infiltrated with 1% lidocaine with epinephrine. The incision was created with a scalpel and deepened to the underlying capsule with electrocautery. A transverse capsulotomy was created and the tissue expander was encountered and found to be intact. It was punctured, aspirated, and removed. The pocket was inspected. Complete incorporation of the acellular dermal matrix was noted. There were no visible or palpable nodules noted. Sizers were placed and attention was turned to the left breast.  An incision was created along the breast meridian and then the lower pole. The breast parenchyma was then divided and the dissection proceeded to the lateral border of the pectoralis muscle. A subpectoral pocket was then created with electrocautery releasing the inferior aspect of the pectoralis muscle to the midline. Sizers were placed.   Attention was then turned to the abdomen. Using a 5 mm Mercedes tip cannula, power-assisted liposuction of the superior lateral abdomen was performed. Approximately 150 mL of lipoaspirate were collected using the [employer] system. The fat was prepared in the usual fashion and the port sites were closed with interrupted 0 Vicryl deep dermal sutures. Attention was turned to the right breast.  A dog ear at the medial aspect of the scar was excised and sent for permanent pathologic analysis. This area was used as access for the fat grafting. Then, 25 mL were grafted to the superior medial aspect of the breast.  Fat could not be grafted globally given the dense adherence of the skin envelope to the underlying capsule. Once the fat grafting had been completed, the fat grafting site was repaired with interrupted 3-0 Vicryl deep dermal sutures and running 4-0 Monocryl subcuticular suture. Attention was turned to the right breast pocket. The capsule was released superiorly along its superior 180 degrees with electrocautery to facilitate expansion of the pocket. Sizers of different volumes and projections were placed. The patient was then placed in the upright position. The left circumvertical mastopexy was tailor tacked. Ultimately, it appeared that the style SSM, 375 mL implant on the right and the style SSL, 170 mL implant on the left gave the best size and shape. Attention was turned to the right breast.  The pocket was irrigated and then antibiotic irrigation until clear. Hemostasis was checked and noted to be adequate. The surgical site was isolated with Ioban and gloves were changed. The implants were brought on the field and immediately bathed in antibiotic irrigation. It was checked for integrity and noted to be intact. The implant was placed in the prepectoral space taking care to maintain its proper orientation. The capsule was then closed with a running 2-0 Vicryl suture.   The deep dermis was reapproximated with 3-0 Vicryl deep dermal sutures and a running 4-0 Monocryl subcuticular suture. The attention was then turned to the left breast.  The new areolar diameter was marked with a 42 mm areolar marker. The circumareolar skin was then incised and de-epithelialized. A vertical incision was then performed along the vertical scar. All the excised tissue was sent for permanent pathologic analysis as left breast tissue. The periareolar dermis was released at approximately 4 o'clock position to the 8 o'clock position, maintaining a superior dermal glandular pedicle. The pocket was then rinsed with Betadine and then antibiotic irrigation until clear. Hemostasis was checked and noted to be adequate. The surgical site was isolated with Ioban and gloves were changed. The implants were brought on the field and immediately bathed in antibiotic irrigation. It was checked for integrity and noted to be intact. The implant was placed in the submuscular pocket taking care to maintain its proper orientation. The deep parenchyma was closed with interrupted 3-0 Vicryl sutures. The deep dermis was then reapproximated with interrupted 3-0 Vicryl deep dermal sutures and running 4-0 Monocryl subcuticular suture. The nipple-areolar complexes appeared well perfused at the completion. Exofin and Steri-Strips were placed on all the incisions. Fluff gauze and a surgical bra were placed on the breast.  TopiFoam and an abdominal binder were placed on the abdomen. The patient was awakened, extubated, and taken to the recovery room in stable condition. There were no operative complications. All needle, sponge, and instrument counts were correct at the end of the procedure. Dictated By Pat Clark M.D.  d: 09/28/2022 10:55:13  t: 09/28/2022 22:24:57  Jimmy Rolle [id number]  [facility]/

## 2022-10-03 ENCOUNTER — OFFICE VISIT (OUTPATIENT)
Dept: SURGERY | Facility: CLINIC | Age: 77
End: 2022-10-03
Payer: MEDICARE

## 2022-10-03 DIAGNOSIS — Z90.11 ABSENCE OF RIGHT BREAST: Primary | ICD-10-CM

## 2022-10-03 NOTE — PROGRESS NOTES
Shalini Dolan is a 78year old female who presents today for a follow-up after removal of right breast tissue expander, right breast capsulotomy, placement of silicone gel implant right breast, autologous fat grafting to right reconstructed breast, left balancing augmentation mastopexy on 9/28/2022 with Dr. Kenya Proctor. She denies fever and chills. She denies nausea, vomiting, diarrhea or constipation. Her pain is controlled. Physical Exam     Surgical incisions are clean, dry, and intact. No erythema, no wound drainage. Breasts: Bilateral breast incisions clean, dry and intact without wound drainage or wound dehiscence. Bilateral breast skin is without erythema, skin breakdown, necrosis. There is resolving ecchymosis on the right breast.  Left nipple viable. No evidence of hematoma or seroma. Good shape and symmetry. Abdomen: Abdomen soft and nontender. No erythema. No evidence of hematoma. Incisions clean, dry and intact. Minimal drainage or wound dehiscence. There were no vitals filed for this visit. Assessment and Plan     Shalini Dolan is doing well s/p removal of right breast tissue expander, right breast capsulotomy, placement of silicone gel implant right breast, autologous fat grafting to right reconstructed breast, left balancing augmentation mastopexy on 9/28/2022 with Dr. Kenya Proctor. New Steri-Strips were placed on the breast incisions today. She was instructed to continue with breast compression at all times. She can discontinue her abdominal binder after 1 week. She can wear a camisole underneath the abdominal binder to prevent skin irritation. She should continue with activity restrictions. We reviewed reasons to contact our office. She will complete her antibiotic prescription as prescribed. She will follow-up with Dr. Kenya Proctor in 1 to 2 weeks. Questions were answered. Patient understands.      Eun Bales  10/3/2022  1:22 PM

## 2022-10-05 ENCOUNTER — TELEPHONE (OUTPATIENT)
Dept: SURGERY | Facility: CLINIC | Age: 77
End: 2022-10-05

## 2022-10-05 NOTE — TELEPHONE ENCOUNTER
Patient called to report sensitivity on her stomach. She states that the TopiFoam caused skin irritation. The abdominal binder was discontinued at the last visit. Patient denies any fever, erythema or swelling. She has sense been applying neosporin to the skin with no relief. I informed patient to switch to Aquaphor. patient will confine to monitor.

## 2022-10-17 NOTE — PROGRESS NOTES
Olga Pelayo is a 78year old female who presents today for a follow-up. She denies fever and chills. She denies nausea, vomiting, diarrhea or constipation. Physical Examination:  Breasts: Bilateral breast incisions are clean dry and intact. Left nipple areolar complex is well-perfused. Abdominal incisions are well-healed. Assessment and Plan:  Patient is doing well. We reviewed scar care including massage moisturizer and silicone products. We discussed options for right nipple areolar reconstruction. The patient may slowly increase activity with compression in place. She will follow-up in 6 months for scar check. The plan was reviewed with the patient and questions were answered.

## 2022-10-18 ENCOUNTER — OFFICE VISIT (OUTPATIENT)
Dept: SURGERY | Facility: CLINIC | Age: 77
End: 2022-10-18
Payer: MEDICARE

## 2022-10-18 DIAGNOSIS — Z90.11 ABSENCE OF RIGHT BREAST: Primary | ICD-10-CM

## 2022-10-18 PROCEDURE — 99024 POSTOP FOLLOW-UP VISIT: CPT | Performed by: SURGERY

## 2022-10-18 RX ORDER — CHROMIUM 200 MCG
TABLET ORAL
COMMUNITY
End: 2022-10-18

## 2022-10-18 RX ORDER — ASPIRIN 81 MG/1
TABLET, CHEWABLE ORAL DAILY
COMMUNITY

## 2022-10-18 RX ORDER — RIBOFLAVIN (VITAMIN B2) 100 MG
100 TABLET ORAL DAILY
COMMUNITY

## 2022-10-18 RX ORDER — CLONAZEPAM 0.12 MG/1
0.12 TABLET, ORALLY DISINTEGRATING ORAL 2 TIMES DAILY PRN
COMMUNITY

## 2022-10-18 RX ORDER — GLUCOSAMINE SULFATE 500 MG
CAPSULE ORAL
COMMUNITY

## 2023-01-18 ENCOUNTER — TELEPHONE (OUTPATIENT)
Dept: FAMILY MEDICINE CLINIC | Facility: CLINIC | Age: 78
End: 2023-01-18

## 2023-01-27 ENCOUNTER — OFFICE VISIT (OUTPATIENT)
Dept: FAMILY MEDICINE CLINIC | Facility: CLINIC | Age: 78
End: 2023-01-27
Payer: MEDICARE

## 2023-01-27 ENCOUNTER — ORDER TRANSCRIPTION (OUTPATIENT)
Dept: ADMINISTRATIVE | Facility: HOSPITAL | Age: 78
End: 2023-01-27

## 2023-01-27 ENCOUNTER — LAB ENCOUNTER (OUTPATIENT)
Dept: LAB | Age: 78
End: 2023-01-27
Attending: STUDENT IN AN ORGANIZED HEALTH CARE EDUCATION/TRAINING PROGRAM
Payer: MEDICARE

## 2023-01-27 VITALS
HEART RATE: 70 BPM | DIASTOLIC BLOOD PRESSURE: 62 MMHG | RESPIRATION RATE: 18 BRPM | BODY MASS INDEX: 26.62 KG/M2 | WEIGHT: 141 LBS | SYSTOLIC BLOOD PRESSURE: 118 MMHG | TEMPERATURE: 97 F | OXYGEN SATURATION: 98 % | HEIGHT: 61 IN

## 2023-01-27 DIAGNOSIS — E55.9 VITAMIN D DEFICIENCY: ICD-10-CM

## 2023-01-27 DIAGNOSIS — E11.9 DIET-CONTROLLED DIABETES MELLITUS (HCC): ICD-10-CM

## 2023-01-27 DIAGNOSIS — Z17.0 MALIGNANT NEOPLASM OF UPPER-OUTER QUADRANT OF RIGHT BREAST IN FEMALE, ESTROGEN RECEPTOR POSITIVE (HCC): ICD-10-CM

## 2023-01-27 DIAGNOSIS — Z01.818 PREOPERATIVE EXAMINATION: ICD-10-CM

## 2023-01-27 DIAGNOSIS — E53.8 VITAMIN B12 DEFICIENCY: ICD-10-CM

## 2023-01-27 DIAGNOSIS — M85.89 OSTEOPENIA OF MULTIPLE SITES: ICD-10-CM

## 2023-01-27 DIAGNOSIS — Z96.653 STATUS POST TOTAL BILATERAL KNEE REPLACEMENT: ICD-10-CM

## 2023-01-27 DIAGNOSIS — Z78.0 POSTMENOPAUSAL: ICD-10-CM

## 2023-01-27 DIAGNOSIS — Z90.13 S/P BILATERAL MASTECTOMY: ICD-10-CM

## 2023-01-27 DIAGNOSIS — Z12.11 SCREEN FOR COLON CANCER: ICD-10-CM

## 2023-01-27 DIAGNOSIS — E11.649 TYPE 2 DIABETES MELLITUS WITH HYPOGLYCEMIA WITHOUT COMA, WITHOUT LONG-TERM CURRENT USE OF INSULIN (HCC): ICD-10-CM

## 2023-01-27 DIAGNOSIS — Z90.11 ABSENCE OF RIGHT BREAST: ICD-10-CM

## 2023-01-27 DIAGNOSIS — Z13.6 SCREENING FOR CARDIOVASCULAR CONDITION: Primary | ICD-10-CM

## 2023-01-27 DIAGNOSIS — C50.411 MALIGNANT NEOPLASM OF UPPER-OUTER QUADRANT OF RIGHT BREAST IN FEMALE, ESTROGEN RECEPTOR POSITIVE (HCC): ICD-10-CM

## 2023-01-27 DIAGNOSIS — E66.3 OVERWEIGHT WITH BODY MASS INDEX (BMI) OF 26 TO 26.9 IN ADULT: ICD-10-CM

## 2023-01-27 DIAGNOSIS — E78.2 MIXED HYPERLIPIDEMIA: ICD-10-CM

## 2023-01-27 DIAGNOSIS — Z00.00 ENCOUNTER FOR MEDICARE ANNUAL WELLNESS EXAM: Primary | ICD-10-CM

## 2023-01-27 DIAGNOSIS — Z85.3 HISTORY OF RIGHT BREAST CANCER: ICD-10-CM

## 2023-01-27 DIAGNOSIS — H25.9 AGE-RELATED CATARACT OF BOTH EYES, UNSPECIFIED AGE-RELATED CATARACT TYPE: ICD-10-CM

## 2023-01-27 PROBLEM — R42 VERTIGO: Status: RESOLVED | Noted: 2017-04-05 | Resolved: 2023-01-27

## 2023-01-27 LAB
ALBUMIN SERPL-MCNC: 4.2 G/DL (ref 3.4–5)
ALBUMIN/GLOB SERPL: 1.2 {RATIO} (ref 1–2)
ALP LIVER SERPL-CCNC: 64 U/L
ALT SERPL-CCNC: 26 U/L
ANION GAP SERPL CALC-SCNC: 8 MMOL/L (ref 0–18)
AST SERPL-CCNC: 27 U/L (ref 15–37)
BASOPHILS # BLD AUTO: 0.04 X10(3) UL (ref 0–0.2)
BASOPHILS NFR BLD AUTO: 1 %
BILIRUB SERPL-MCNC: 0.9 MG/DL (ref 0.1–2)
BUN BLD-MCNC: 20 MG/DL (ref 7–18)
BUN/CREAT SERPL: 23.3 (ref 10–20)
CALCIUM BLD-MCNC: 9.6 MG/DL (ref 8.5–10.1)
CHLORIDE SERPL-SCNC: 105 MMOL/L (ref 98–112)
CHOLEST SERPL-MCNC: 255 MG/DL (ref ?–200)
CO2 SERPL-SCNC: 29 MMOL/L (ref 21–32)
CREAT BLD-MCNC: 0.86 MG/DL
CREAT UR-SCNC: 96.2 MG/DL
DEPRECATED RDW RBC AUTO: 42.1 FL (ref 35.1–46.3)
EOSINOPHIL # BLD AUTO: 0.05 X10(3) UL (ref 0–0.7)
EOSINOPHIL NFR BLD AUTO: 1.2 %
ERYTHROCYTE [DISTWIDTH] IN BLOOD BY AUTOMATED COUNT: 12.1 % (ref 11–15)
EST. AVERAGE GLUCOSE BLD GHB EST-MCNC: 111 MG/DL (ref 68–126)
FASTING PATIENT LIPID ANSWER: YES
FASTING STATUS PATIENT QL REPORTED: YES
GFR SERPLBLD BASED ON 1.73 SQ M-ARVRAT: 70 ML/MIN/1.73M2 (ref 60–?)
GLOBULIN PLAS-MCNC: 3.5 G/DL (ref 2.8–4.4)
GLUCOSE BLD-MCNC: 100 MG/DL (ref 70–99)
HBA1C MFR BLD: 5.5 % (ref ?–5.7)
HCT VFR BLD AUTO: 42.7 %
HDLC SERPL-MCNC: 67 MG/DL (ref 40–59)
HGB BLD-MCNC: 14.1 G/DL
IMM GRANULOCYTES # BLD AUTO: 0.01 X10(3) UL (ref 0–1)
IMM GRANULOCYTES NFR BLD: 0.2 %
LDLC SERPL CALC-MCNC: 160 MG/DL (ref ?–100)
LYMPHOCYTES # BLD AUTO: 1.13 X10(3) UL (ref 1–4)
LYMPHOCYTES NFR BLD AUTO: 28.1 %
MCH RBC QN AUTO: 31.5 PG (ref 26–34)
MCHC RBC AUTO-ENTMCNC: 33 G/DL (ref 31–37)
MCV RBC AUTO: 95.5 FL
MICROALBUMIN UR-MCNC: 1.06 MG/DL
MICROALBUMIN/CREAT 24H UR-RTO: 11 UG/MG (ref ?–30)
MONOCYTES # BLD AUTO: 0.32 X10(3) UL (ref 0.1–1)
MONOCYTES NFR BLD AUTO: 8 %
NEUTROPHILS # BLD AUTO: 2.47 X10 (3) UL (ref 1.5–7.7)
NEUTROPHILS # BLD AUTO: 2.47 X10(3) UL (ref 1.5–7.7)
NEUTROPHILS NFR BLD AUTO: 61.5 %
NONHDLC SERPL-MCNC: 188 MG/DL (ref ?–130)
OSMOLALITY SERPL CALC.SUM OF ELEC: 297 MOSM/KG (ref 275–295)
PLATELET # BLD AUTO: 226 10(3)UL (ref 150–450)
POTASSIUM SERPL-SCNC: 4.3 MMOL/L (ref 3.5–5.1)
PROT SERPL-MCNC: 7.7 G/DL (ref 6.4–8.2)
RBC # BLD AUTO: 4.47 X10(6)UL
SODIUM SERPL-SCNC: 142 MMOL/L (ref 136–145)
TRIGL SERPL-MCNC: 154 MG/DL (ref 30–149)
TSI SER-ACNC: 3.46 MIU/ML (ref 0.36–3.74)
VIT B12 SERPL-MCNC: 1395 PG/ML (ref 193–986)
VIT D+METAB SERPL-MCNC: 81.6 NG/ML (ref 30–100)
VLDLC SERPL CALC-MCNC: 30 MG/DL (ref 0–30)
WBC # BLD AUTO: 4 X10(3) UL (ref 4–11)

## 2023-01-27 PROCEDURE — 80061 LIPID PANEL: CPT

## 2023-01-27 PROCEDURE — 80053 COMPREHEN METABOLIC PANEL: CPT

## 2023-01-27 PROCEDURE — 3008F BODY MASS INDEX DOCD: CPT | Performed by: STUDENT IN AN ORGANIZED HEALTH CARE EDUCATION/TRAINING PROGRAM

## 2023-01-27 PROCEDURE — 85025 COMPLETE CBC W/AUTO DIFF WBC: CPT

## 2023-01-27 PROCEDURE — 99397 PER PM REEVAL EST PAT 65+ YR: CPT | Performed by: STUDENT IN AN ORGANIZED HEALTH CARE EDUCATION/TRAINING PROGRAM

## 2023-01-27 PROCEDURE — 84443 ASSAY THYROID STIM HORMONE: CPT

## 2023-01-27 PROCEDURE — 82570 ASSAY OF URINE CREATININE: CPT

## 2023-01-27 PROCEDURE — 3078F DIAST BP <80 MM HG: CPT | Performed by: STUDENT IN AN ORGANIZED HEALTH CARE EDUCATION/TRAINING PROGRAM

## 2023-01-27 PROCEDURE — G0439 PPPS, SUBSEQ VISIT: HCPCS | Performed by: STUDENT IN AN ORGANIZED HEALTH CARE EDUCATION/TRAINING PROGRAM

## 2023-01-27 PROCEDURE — 83036 HEMOGLOBIN GLYCOSYLATED A1C: CPT

## 2023-01-27 PROCEDURE — 82043 UR ALBUMIN QUANTITATIVE: CPT

## 2023-01-27 PROCEDURE — 82607 VITAMIN B-12: CPT

## 2023-01-27 PROCEDURE — 96160 PT-FOCUSED HLTH RISK ASSMT: CPT | Performed by: STUDENT IN AN ORGANIZED HEALTH CARE EDUCATION/TRAINING PROGRAM

## 2023-01-27 PROCEDURE — 1125F AMNT PAIN NOTED PAIN PRSNT: CPT | Performed by: STUDENT IN AN ORGANIZED HEALTH CARE EDUCATION/TRAINING PROGRAM

## 2023-01-27 PROCEDURE — 82306 VITAMIN D 25 HYDROXY: CPT

## 2023-01-27 PROCEDURE — 3074F SYST BP LT 130 MM HG: CPT | Performed by: STUDENT IN AN ORGANIZED HEALTH CARE EDUCATION/TRAINING PROGRAM

## 2023-02-10 ENCOUNTER — OFFICE VISIT (OUTPATIENT)
Dept: HEMATOLOGY/ONCOLOGY | Facility: HOSPITAL | Age: 78
End: 2023-02-10
Attending: SPECIALIST
Payer: MEDICARE

## 2023-02-10 ENCOUNTER — TELEPHONE (OUTPATIENT)
Dept: HEMATOLOGY/ONCOLOGY | Facility: HOSPITAL | Age: 78
End: 2023-02-10

## 2023-02-10 VITALS
TEMPERATURE: 98 F | HEART RATE: 75 BPM | DIASTOLIC BLOOD PRESSURE: 83 MMHG | OXYGEN SATURATION: 94 % | RESPIRATION RATE: 20 BRPM | BODY MASS INDEX: 27 KG/M2 | SYSTOLIC BLOOD PRESSURE: 167 MMHG | WEIGHT: 144 LBS

## 2023-02-10 DIAGNOSIS — C50.411 MALIGNANT NEOPLASM OF UPPER-OUTER QUADRANT OF RIGHT BREAST IN FEMALE, ESTROGEN RECEPTOR POSITIVE (HCC): Primary | ICD-10-CM

## 2023-02-10 DIAGNOSIS — Z78.0 OSTEOPENIA AFTER MENOPAUSE: ICD-10-CM

## 2023-02-10 DIAGNOSIS — M85.80 OSTEOPENIA AFTER MENOPAUSE: ICD-10-CM

## 2023-02-10 DIAGNOSIS — Z17.0 MALIGNANT NEOPLASM OF UPPER-OUTER QUADRANT OF RIGHT BREAST IN FEMALE, ESTROGEN RECEPTOR POSITIVE (HCC): Primary | ICD-10-CM

## 2023-02-10 DIAGNOSIS — C50.911 RECURRENT BREAST CANCER, RIGHT (HCC): ICD-10-CM

## 2023-02-10 PROCEDURE — 99215 OFFICE O/P EST HI 40 MIN: CPT | Performed by: SPECIALIST

## 2023-02-10 RX ORDER — ANASTROZOLE 1 MG/1
1 TABLET ORAL DAILY
Qty: 30 TABLET | Refills: 5 | Status: SHIPPED | OUTPATIENT
Start: 2023-02-10 | End: 2023-02-10 | Stop reason: CLARIF

## 2023-02-10 RX ORDER — ANASTROZOLE 1 MG/1
1 TABLET ORAL DAILY
Qty: 30 TABLET | Refills: 2 | COMMUNITY
Start: 2023-02-10

## 2023-02-10 NOTE — PROGRESS NOTES
Here for MD fu visit.    Continues on Anastrozole daily, \"when she remembers\"  +Fatigue  Bone density done 4/22  Education Record    Learner:  Patient and spouse    Disease / Diagnosis:    Barriers / Limitations:  None   Comments:    Method:  Brief focused and Printed material   Comments:    General Topics:  Medication, Side effects and symptom management and Plan of care reviewed   Comments:    Outcome:  Shows understanding   Comments:

## 2023-02-10 NOTE — TELEPHONE ENCOUNTER
RN called [facility] pharmacy- requesting change in Anastrozole refills. Per Dr. Samira Schultz, pt to be given 2 additional refills.  Prescription updated

## 2023-02-17 ENCOUNTER — HOSPITAL ENCOUNTER (OUTPATIENT)
Dept: CT IMAGING | Age: 78
Discharge: HOME | End: 2023-02-17
Attending: STUDENT IN AN ORGANIZED HEALTH CARE EDUCATION/TRAINING PROGRAM

## 2023-02-17 DIAGNOSIS — R91.1 PULMONARY NODULE LESS THAN 6 MM IN DIAMETER WITH HIGH RISK FOR MALIGNANT NEOPLASM: Primary | ICD-10-CM

## 2023-02-17 DIAGNOSIS — Z91.89 PULMONARY NODULE LESS THAN 6 MM IN DIAMETER WITH HIGH RISK FOR MALIGNANT NEOPLASM: Primary | ICD-10-CM

## 2023-02-17 DIAGNOSIS — Z13.6 SCREENING FOR CARDIOVASCULAR CONDITION: ICD-10-CM

## 2023-02-17 NOTE — PROGRESS NOTES
Date of Service 2/17/2023    Sterling Ear  Date of Birth 11/9/1945    Patient Age: 78year old    PCP: MD Ricardo Taylor [facility] [phone]    Consult Type  Type Scan/Screening: Heart Scan  Preliminary Heart Scan Score: 876.66        Lipid Profile  Cholesterol: 255, done on 1/27/2023. HDL Cholesterol: 67, done on 1/27/2023. LDL Cholesterol: 160, done on 1/27/2023. TriGlycerides 154, done on 1/27/2023. Nurse Review  Risk factor information and results reviewed with Nurse: Yes    Recommended Follow Up:  Consult your physician regarding[de-identified] Final Heart Scan Report; Discuss potential for Incidental Finding    Free PV Screening offered to patient. (Patient offered a free PV Stroke Screening Carotid and Abdominal Aorta Ultrasound. She will consider this testing and call the RN Line at [phone] if interested to be scheduled.)      Recommendations for Change:  Nutrition Changes: Low Saturated Fat;Low Fat Dairy; Increase Fiber     Cholesterol Modification (goal of therapy depends upon your risk):   Decrease LDL (Lousy/Bad) Ideal <100;  Decrease Triglycerides (Ugly) Normal <150    Exercise: Enhance Current Program        Stress Management: Adopt Stress Management Techniques     Repeat Heart Scan:   3 Years if Calcium Score is > 0.0; Discuss with your Physician          [place] Recommended Resources:  Recommended Resources: Upcoming Classes, Medical Services and Health Library www. [facility]. Angela Brown RN        Please Contact the Nurse Heart Line with any Questions or Concerns [phone].

## 2023-02-21 ENCOUNTER — TELEPHONE (OUTPATIENT)
Dept: FAMILY MEDICINE CLINIC | Facility: CLINIC | Age: 78
End: 2023-02-21

## 2023-02-21 DIAGNOSIS — Z12.31 ENCOUNTER FOR SCREENING MAMMOGRAM FOR MALIGNANT NEOPLASM OF BREAST: Primary | ICD-10-CM

## 2023-02-21 NOTE — TELEPHONE ENCOUNTER
Pt called and asked for a mammogram order because she is overdue for one and is concerned.     Please advise

## 2023-02-21 NOTE — TELEPHONE ENCOUNTER
Pt was seen in 01/27/2023. She forgot to ask for an order for her left breast mammogram. She has a hx of right breast cancer. Order pended for authorization.

## 2023-02-23 ENCOUNTER — TELEPHONE (OUTPATIENT)
Dept: FAMILY MEDICINE CLINIC | Facility: CLINIC | Age: 78
End: 2023-02-23

## 2023-02-23 DIAGNOSIS — Z85.3 HISTORY OF RIGHT BREAST CANCER: Primary | ICD-10-CM

## 2023-02-23 DIAGNOSIS — Z12.31 ENCOUNTER FOR SCREENING MAMMOGRAM FOR MALIGNANT NEOPLASM OF BREAST: ICD-10-CM

## 2023-02-23 NOTE — TELEPHONE ENCOUNTER
Kody Granados from outpatient called and stated pt was scheduling her Mammogram and wanted a correction on the order. Per Kody Granados it is for left breast, but the current order is right. Corrected.

## 2023-03-01 ENCOUNTER — HOSPITAL ENCOUNTER (OUTPATIENT)
Dept: MAMMOGRAPHY | Age: 78
Discharge: HOME | End: 2023-03-01
Attending: STUDENT IN AN ORGANIZED HEALTH CARE EDUCATION/TRAINING PROGRAM
Payer: MEDICARE

## 2023-03-01 DIAGNOSIS — Z12.31 ENCOUNTER FOR SCREENING MAMMOGRAM FOR MALIGNANT NEOPLASM OF BREAST: ICD-10-CM

## 2023-03-01 DIAGNOSIS — Z85.3 HISTORY OF RIGHT BREAST CANCER: ICD-10-CM

## 2023-03-01 PROCEDURE — 77067 SCR MAMMO BI INCL CAD: CPT | Performed by: STUDENT IN AN ORGANIZED HEALTH CARE EDUCATION/TRAINING PROGRAM

## 2023-03-01 PROCEDURE — 77063 BREAST TOMOSYNTHESIS BI: CPT | Performed by: STUDENT IN AN ORGANIZED HEALTH CARE EDUCATION/TRAINING PROGRAM

## 2023-03-17 ENCOUNTER — HOSPITAL ENCOUNTER (OUTPATIENT)
Dept: ULTRASOUND IMAGING | Age: 78
Discharge: HOME | End: 2023-03-17
Attending: Other
Payer: MEDICARE

## 2023-03-17 ENCOUNTER — HOSPITAL ENCOUNTER (OUTPATIENT)
Dept: MAMMOGRAPHY | Age: 78
Discharge: HOME | End: 2023-03-17
Attending: STUDENT IN AN ORGANIZED HEALTH CARE EDUCATION/TRAINING PROGRAM
Payer: MEDICARE

## 2023-03-17 ENCOUNTER — TELEPHONE (OUTPATIENT)
Dept: CASE MANAGEMENT | Age: 78
End: 2023-03-17

## 2023-03-17 DIAGNOSIS — G51.0 FACIAL NERVE PALSY: ICD-10-CM

## 2023-03-17 DIAGNOSIS — R92.8 ABNORMAL MAMMOGRAM: ICD-10-CM

## 2023-03-17 PROCEDURE — 76642 ULTRASOUND BREAST LIMITED: CPT | Performed by: OTHER

## 2023-03-17 PROCEDURE — 77061 BREAST TOMOSYNTHESIS UNI: CPT | Performed by: STUDENT IN AN ORGANIZED HEALTH CARE EDUCATION/TRAINING PROGRAM

## 2023-03-17 PROCEDURE — 77065 DX MAMMO INCL CAD UNI: CPT | Performed by: STUDENT IN AN ORGANIZED HEALTH CARE EDUCATION/TRAINING PROGRAM

## 2023-03-17 NOTE — IMAGING NOTE
This [facility] RN assisted Dr. Renetta Ontiveros with recommendation for a left 2 site ultrasound guided biopsy. Procedure reviewed and all questions answered. Emotional and educational support given. On the day of the biopsy, pt instructed to take Tylenol 1000mg PO, eat a light meal & bring or wear a sports bra. Post biopsy care also reviewed with pt to include NO lifting more than 5lbs, no exercising or housework (limit upper body movement) for 24-48 hrs post biopsy. Pt denies blood thinners, bleeding disorders, liver disease, and chemo. Pt verbalized understanding. Our breast center schedulers will be calling to schedule an appt that is convenient for pt.

## 2023-03-20 ENCOUNTER — TELEPHONE (OUTPATIENT)
Dept: SURGERY | Facility: CLINIC | Age: 78
End: 2023-03-20

## 2023-03-20 NOTE — TELEPHONE ENCOUNTER
Spoke to patient about recent imaging that is required ultrasound guided biopsy. I instructed patient to contact her breast surgeon, Dr. Renae Judge, to proceed with biopsy. Patient was worried that the biopsy could affect the implant. I informed patient that it is recommended to proceed with biopsy and she will meet with Dr. Yaritza Barreto on 4/21/23 as scheduled.

## 2023-03-23 ENCOUNTER — ORDER TRANSCRIPTION (OUTPATIENT)
Dept: ADMINISTRATIVE | Facility: HOSPITAL | Age: 78
End: 2023-03-23

## 2023-03-23 DIAGNOSIS — R93.1 AGATSTON CORONARY ARTERY CALCIUM SCORE GREATER THAN 400: Primary | ICD-10-CM

## 2023-03-23 DIAGNOSIS — I10 HTN (HYPERTENSION): ICD-10-CM

## 2023-03-23 DIAGNOSIS — I25.10 CORONARY ARTERY DISEASE, UNSPECIFIED VESSEL OR LESION TYPE, UNSPECIFIED WHETHER ANGINA PRESENT, UNSPECIFIED WHETHER NATIVE OR TRANSPLANTED HEART: ICD-10-CM

## 2023-04-10 NOTE — IMAGING NOTE
Call placed to pt regarding CTA Gated Coronary. Instructed to arrive at 13:15. May eat a light breakfast/lunch but drink plenty of fluids a day before and morning of procedure. .   Advised to hold caffeine 12 hrs prior to procedure. Pt denies taking Viagra, Cialis, Levitra, Imdur. May take usual meds.

## 2023-04-12 ENCOUNTER — HOSPITAL ENCOUNTER (OUTPATIENT)
Dept: CT IMAGING | Facility: HOSPITAL | Age: 78
Discharge: HOME | End: 2023-04-12
Attending: INTERNAL MEDICINE
Payer: MEDICARE

## 2023-04-12 VITALS
SYSTOLIC BLOOD PRESSURE: 122 MMHG | HEART RATE: 61 BPM | RESPIRATION RATE: 13 BRPM | OXYGEN SATURATION: 96 % | DIASTOLIC BLOOD PRESSURE: 64 MMHG

## 2023-04-12 DIAGNOSIS — R93.1 AGATSTON CORONARY ARTERY CALCIUM SCORE GREATER THAN 400: ICD-10-CM

## 2023-04-12 DIAGNOSIS — I25.10 CORONARY ARTERY DISEASE, UNSPECIFIED VESSEL OR LESION TYPE, UNSPECIFIED WHETHER ANGINA PRESENT, UNSPECIFIED WHETHER NATIVE OR TRANSPLANTED HEART: ICD-10-CM

## 2023-04-12 DIAGNOSIS — I10 HTN (HYPERTENSION): ICD-10-CM

## 2023-04-12 LAB
CREAT BLD-MCNC: 0.8 MG/DL
GFR SERPLBLD BASED ON 1.73 SQ M-ARVRAT: 77 ML/MIN/1.73M2 (ref 60–?)

## 2023-04-12 PROCEDURE — 82565 ASSAY OF CREATININE: CPT

## 2023-04-12 PROCEDURE — 0503T CTA FRACTIONAL FLOW RESERVE ANALYSIS (CPT=0503T/0502T): CPT | Performed by: INTERNAL MEDICINE

## 2023-04-12 PROCEDURE — 0502T CTA FRACTIONAL FLOW RESERVE ANALYSIS (CPT=0503T/0502T): CPT | Performed by: INTERNAL MEDICINE

## 2023-04-12 PROCEDURE — 75574 CT ANGIO HRT W/3D IMAGE: CPT | Performed by: INTERNAL MEDICINE

## 2023-04-12 RX ORDER — NITROGLYCERIN 0.4 MG/1
TABLET SUBLINGUAL
Status: COMPLETED
Start: 2023-04-12 | End: 2023-04-12

## 2023-04-12 RX ORDER — METOPROLOL TARTRATE 5 MG/5ML
INJECTION INTRAVENOUS
Status: COMPLETED
Start: 2023-04-12 | End: 2023-04-12

## 2023-04-12 RX ADMIN — NITROGLYCERIN: 0.4 TABLET SUBLINGUAL at 14:20:00

## 2023-04-12 RX ADMIN — METOPROLOL TARTRATE 5 MG: 5 INJECTION INTRAVENOUS at 14:17:00

## 2023-04-12 NOTE — IMAGING NOTE
Pt arrives to room CT 4 at 14:05. Working with Nurme 49. Pt denies long acting nitrates. Pt positioned on CT table comfortably. Procedure explained and questions answered. O2 applied via NC at 2 LPM. VSS as noted in flowsheet. Contrast = 80 mL  0.9NS = 50 mL  Average HR = 62    Pt tolerated procedure without complication. Denies s/sx of contrast reaction. Pt currently denies chest pain. Ambulated back to radiology holding for monitoring.

## 2023-04-28 ENCOUNTER — OFFICE VISIT (OUTPATIENT)
Dept: SURGERY | Facility: CLINIC | Age: 78
End: 2023-04-28
Payer: MEDICARE

## 2023-04-28 DIAGNOSIS — Z90.11 ABSENCE OF RIGHT BREAST: Primary | ICD-10-CM

## 2023-04-28 PROCEDURE — 99212 OFFICE O/P EST SF 10 MIN: CPT | Performed by: SURGERY

## 2023-04-28 NOTE — PROGRESS NOTES
Sidra Hunt is a 78year old female who presents today for a follow-up after undergoing exchange of a permanent implant and a contralateral augmentation mastopexy in September. She recently underwent imaging of her left breast which showed a suspicious area and biopsy was recommended which patient is currently waiting. Physical Examination:  Breasts: Bilateral breast incisions are clean dry and intact. The right breast is noted have a positive subcutaneous fat. There are no palpable masses noted bilaterally. There is no left nipple discharge or nipple inversion noted. Assessment and Plan:  I recommended to the patient and her  that she undergo her scheduled breast biopsy. We discussed continuing regular self breast examination with a plan for follow-up in 1 year or sooner if any changes are detected or any issues are noted on biopsy. The plan was reviewed with the patient and  and questions were answered.

## 2023-05-22 ENCOUNTER — HOSPITAL ENCOUNTER (OUTPATIENT)
Dept: MAMMOGRAPHY | Facility: HOSPITAL | Age: 78
Discharge: HOME | End: 2023-05-22
Attending: STUDENT IN AN ORGANIZED HEALTH CARE EDUCATION/TRAINING PROGRAM
Payer: MEDICARE

## 2023-05-22 DIAGNOSIS — N63.0 BREAST NODULE: ICD-10-CM

## 2023-05-22 PROCEDURE — 88342 IMHCHEM/IMCYTCHM 1ST ANTB: CPT | Performed by: STUDENT IN AN ORGANIZED HEALTH CARE EDUCATION/TRAINING PROGRAM

## 2023-05-22 PROCEDURE — 19084 BX BREAST ADD LESION US IMAG: CPT | Performed by: STUDENT IN AN ORGANIZED HEALTH CARE EDUCATION/TRAINING PROGRAM

## 2023-05-22 PROCEDURE — 19083 BX BREAST 1ST LESION US IMAG: CPT | Performed by: STUDENT IN AN ORGANIZED HEALTH CARE EDUCATION/TRAINING PROGRAM

## 2023-05-22 PROCEDURE — 88341 IMHCHEM/IMCYTCHM EA ADD ANTB: CPT | Performed by: STUDENT IN AN ORGANIZED HEALTH CARE EDUCATION/TRAINING PROGRAM

## 2023-05-22 PROCEDURE — 77065 DX MAMMO INCL CAD UNI: CPT | Performed by: STUDENT IN AN ORGANIZED HEALTH CARE EDUCATION/TRAINING PROGRAM

## 2023-05-22 PROCEDURE — 88305 TISSUE EXAM BY PATHOLOGIST: CPT | Performed by: STUDENT IN AN ORGANIZED HEALTH CARE EDUCATION/TRAINING PROGRAM

## 2023-05-24 ENCOUNTER — TELEPHONE (OUTPATIENT)
Dept: MAMMOGRAPHY | Facility: HOSPITAL | Age: 78
End: 2023-05-24

## 2023-05-24 NOTE — TELEPHONE ENCOUNTER
Telephoned Holly Canales and name,  verified with patient. Notified Bharat Hunter of left breast 2 site negative for malignancy biopsy result. Concordance pending. Bharat Hunter reports biopsy site is healing well. Radiologist recommends 6 month follow up mammogram.  Pt verbalized understanding and had no further questions at this time.

## 2023-05-28 DIAGNOSIS — R92.8 ABNORMAL MAMMOGRAM OF LEFT BREAST: Primary | ICD-10-CM

## 2023-06-19 ENCOUNTER — WALK IN (OUTPATIENT)
Dept: URGENT CARE | Age: 78
End: 2023-06-19

## 2023-06-19 VITALS
OXYGEN SATURATION: 98 % | RESPIRATION RATE: 14 BRPM | HEIGHT: 61 IN | WEIGHT: 133 LBS | DIASTOLIC BLOOD PRESSURE: 70 MMHG | SYSTOLIC BLOOD PRESSURE: 122 MMHG | TEMPERATURE: 97.7 F | HEART RATE: 82 BPM | BODY MASS INDEX: 25.11 KG/M2

## 2023-06-19 DIAGNOSIS — H61.22 HEARING LOSS OF LEFT EAR DUE TO CERUMEN IMPACTION: Primary | ICD-10-CM

## 2023-06-19 PROCEDURE — 99202 OFFICE O/P NEW SF 15 MIN: CPT | Performed by: NURSE PRACTITIONER

## 2023-06-19 ASSESSMENT — ENCOUNTER SYMPTOMS
ALLERGIC/IMMUNOLOGIC NEGATIVE: 1
EYES NEGATIVE: 1
NEUROLOGICAL NEGATIVE: 1
RESPIRATORY NEGATIVE: 1
PSYCHIATRIC NEGATIVE: 1
GASTROINTESTINAL NEGATIVE: 1
ENDOCRINE NEGATIVE: 1
CONSTITUTIONAL NEGATIVE: 1
HEMATOLOGIC/LYMPHATIC NEGATIVE: 1

## 2023-06-19 ASSESSMENT — PAIN SCALES - GENERAL: PAINLEVEL: 0

## 2024-01-05 ENCOUNTER — LAB ENCOUNTER (OUTPATIENT)
Dept: LAB | Age: 79
End: 2024-01-05
Attending: STUDENT IN AN ORGANIZED HEALTH CARE EDUCATION/TRAINING PROGRAM
Payer: MEDICARE

## 2024-01-05 ENCOUNTER — OFFICE VISIT (OUTPATIENT)
Dept: FAMILY MEDICINE CLINIC | Facility: CLINIC | Age: 79
End: 2024-01-05
Payer: MEDICARE

## 2024-01-05 VITALS
HEART RATE: 72 BPM | TEMPERATURE: 98 F | BODY MASS INDEX: 23.08 KG/M2 | WEIGHT: 123.81 LBS | SYSTOLIC BLOOD PRESSURE: 110 MMHG | HEIGHT: 61.42 IN | DIASTOLIC BLOOD PRESSURE: 60 MMHG | RESPIRATION RATE: 16 BRPM

## 2024-01-05 DIAGNOSIS — Z96.653 STATUS POST TOTAL BILATERAL KNEE REPLACEMENT: ICD-10-CM

## 2024-01-05 DIAGNOSIS — Z00.00 ENCOUNTER FOR MEDICARE ANNUAL WELLNESS EXAM: Primary | ICD-10-CM

## 2024-01-05 DIAGNOSIS — E78.2 MIXED HYPERLIPIDEMIA: ICD-10-CM

## 2024-01-05 DIAGNOSIS — E11.9 DIET-CONTROLLED DIABETES MELLITUS (HCC): ICD-10-CM

## 2024-01-05 DIAGNOSIS — M81.0 AGE-RELATED OSTEOPOROSIS WITHOUT CURRENT PATHOLOGICAL FRACTURE: ICD-10-CM

## 2024-01-05 DIAGNOSIS — Z85.3 HISTORY OF BREAST CANCER: ICD-10-CM

## 2024-01-05 DIAGNOSIS — E11.36 TYPE 2 DIABETES MELLITUS WITH DIABETIC CATARACT, WITHOUT LONG-TERM CURRENT USE OF INSULIN (HCC): ICD-10-CM

## 2024-01-05 PROBLEM — C50.911 RECURRENT BREAST CANCER, RIGHT (HCC): Status: ACTIVE | Noted: 2024-01-05

## 2024-01-05 PROBLEM — C50.411 MALIGNANT NEOPLASM OF UPPER-OUTER QUADRANT OF RIGHT BREAST IN FEMALE, ESTROGEN RECEPTOR POSITIVE (HCC): Status: RESOLVED | Noted: 2017-05-01 | Resolved: 2024-01-05

## 2024-01-05 PROBLEM — E11.649 TYPE 2 DIABETES MELLITUS WITH HYPOGLYCEMIA WITHOUT COMA, WITHOUT LONG-TERM CURRENT USE OF INSULIN (HCC): Status: RESOLVED | Noted: 2023-01-27 | Resolved: 2024-01-05

## 2024-01-05 PROBLEM — Z17.0 MALIGNANT NEOPLASM OF UPPER-OUTER QUADRANT OF RIGHT BREAST IN FEMALE, ESTROGEN RECEPTOR POSITIVE (HCC): Status: RESOLVED | Noted: 2017-05-01 | Resolved: 2024-01-05

## 2024-01-05 PROBLEM — C50.911 RECURRENT BREAST CANCER, RIGHT (HCC): Status: RESOLVED | Noted: 2024-01-05 | Resolved: 2024-01-05

## 2024-01-05 PROBLEM — C50.411 MALIGNANT NEOPLASM OF UPPER-OUTER QUADRANT OF RIGHT BREAST IN FEMALE, ESTROGEN RECEPTOR POSITIVE  (HCC): Status: RESOLVED | Noted: 2017-05-01 | Resolved: 2024-01-05

## 2024-01-05 PROBLEM — Z17.0 MALIGNANT NEOPLASM OF UPPER-OUTER QUADRANT OF RIGHT BREAST IN FEMALE, ESTROGEN RECEPTOR POSITIVE  (HCC): Status: RESOLVED | Noted: 2017-05-01 | Resolved: 2024-01-05

## 2024-01-05 LAB
ALBUMIN SERPL-MCNC: 4.3 G/DL (ref 3.4–5)
ALBUMIN/GLOB SERPL: 1.3 {RATIO} (ref 1–2)
ALP LIVER SERPL-CCNC: 67 U/L
ALT SERPL-CCNC: 27 U/L
ANION GAP SERPL CALC-SCNC: 6 MMOL/L (ref 0–18)
AST SERPL-CCNC: 20 U/L (ref 15–37)
BASOPHILS # BLD AUTO: 0.02 X10(3) UL (ref 0–0.2)
BASOPHILS NFR BLD AUTO: 0.7 %
BILIRUB SERPL-MCNC: 1 MG/DL (ref 0.1–2)
BILIRUB UR QL STRIP.AUTO: NEGATIVE
BUN BLD-MCNC: 18 MG/DL (ref 9–23)
CALCIUM BLD-MCNC: 9.6 MG/DL (ref 8.5–10.1)
CHLORIDE SERPL-SCNC: 108 MMOL/L (ref 98–112)
CHOLEST SERPL-MCNC: 191 MG/DL (ref ?–200)
CLARITY UR REFRACT.AUTO: CLEAR
CO2 SERPL-SCNC: 27 MMOL/L (ref 21–32)
CREAT BLD-MCNC: 0.64 MG/DL
CREAT UR-SCNC: 82 MG/DL
EGFRCR SERPLBLD CKD-EPI 2021: 92 ML/MIN/1.73M2 (ref 60–?)
EOSINOPHIL # BLD AUTO: 0.04 X10(3) UL (ref 0–0.7)
EOSINOPHIL NFR BLD AUTO: 1.4 %
ERYTHROCYTE [DISTWIDTH] IN BLOOD BY AUTOMATED COUNT: 12.7 %
EST. AVERAGE GLUCOSE BLD GHB EST-MCNC: 97 MG/DL (ref 68–126)
FASTING PATIENT LIPID ANSWER: YES
FASTING STATUS PATIENT QL REPORTED: YES
GLOBULIN PLAS-MCNC: 3.2 G/DL (ref 2.8–4.4)
GLUCOSE BLD-MCNC: 84 MG/DL (ref 70–99)
GLUCOSE UR STRIP.AUTO-MCNC: NORMAL MG/DL
HBA1C MFR BLD: 5 % (ref ?–5.7)
HCT VFR BLD AUTO: 43.4 %
HDLC SERPL-MCNC: 70 MG/DL (ref 40–59)
HGB BLD-MCNC: 14 G/DL
IMM GRANULOCYTES # BLD AUTO: 0.01 X10(3) UL (ref 0–1)
IMM GRANULOCYTES NFR BLD: 0.3 %
KETONES UR STRIP.AUTO-MCNC: NEGATIVE MG/DL
LDLC SERPL CALC-MCNC: 105 MG/DL (ref ?–100)
LEUKOCYTE ESTERASE UR QL STRIP.AUTO: 75
LYMPHOCYTES # BLD AUTO: 0.64 X10(3) UL (ref 1–4)
LYMPHOCYTES NFR BLD AUTO: 22.3 %
MCH RBC QN AUTO: 31.7 PG (ref 26–34)
MCHC RBC AUTO-ENTMCNC: 32.3 G/DL (ref 31–37)
MCV RBC AUTO: 98.2 FL
MICROALBUMIN UR-MCNC: 0.88 MG/DL
MICROALBUMIN/CREAT 24H UR-RTO: 10.7 UG/MG (ref ?–30)
MONOCYTES # BLD AUTO: 0.3 X10(3) UL (ref 0.1–1)
MONOCYTES NFR BLD AUTO: 10.5 %
NEUTROPHILS # BLD AUTO: 1.86 X10 (3) UL (ref 1.5–7.7)
NEUTROPHILS # BLD AUTO: 1.86 X10(3) UL (ref 1.5–7.7)
NEUTROPHILS NFR BLD AUTO: 64.8 %
NITRITE UR QL STRIP.AUTO: NEGATIVE
NONHDLC SERPL-MCNC: 121 MG/DL (ref ?–130)
OSMOLALITY SERPL CALC.SUM OF ELEC: 293 MOSM/KG (ref 275–295)
PH UR STRIP.AUTO: 5 [PH] (ref 5–8)
PLATELET # BLD AUTO: 183 10(3)UL (ref 150–450)
POTASSIUM SERPL-SCNC: 4 MMOL/L (ref 3.5–5.1)
PROT SERPL-MCNC: 7.5 G/DL (ref 6.4–8.2)
PROT UR STRIP.AUTO-MCNC: NEGATIVE MG/DL
RBC # BLD AUTO: 4.42 X10(6)UL
RBC UR QL AUTO: NEGATIVE
SODIUM SERPL-SCNC: 141 MMOL/L (ref 136–145)
SP GR UR STRIP.AUTO: 1.01 (ref 1–1.03)
TRIGL SERPL-MCNC: 86 MG/DL (ref 30–149)
TSI SER-ACNC: 1.62 MIU/ML (ref 0.36–3.74)
UROBILINOGEN UR STRIP.AUTO-MCNC: NORMAL MG/DL
VIT D+METAB SERPL-MCNC: 55.3 NG/ML (ref 30–100)
VLDLC SERPL CALC-MCNC: 14 MG/DL (ref 0–30)
WBC # BLD AUTO: 2.9 X10(3) UL (ref 4–11)

## 2024-01-05 PROCEDURE — 1160F RVW MEDS BY RX/DR IN RCRD: CPT | Performed by: STUDENT IN AN ORGANIZED HEALTH CARE EDUCATION/TRAINING PROGRAM

## 2024-01-05 PROCEDURE — 1126F AMNT PAIN NOTED NONE PRSNT: CPT | Performed by: STUDENT IN AN ORGANIZED HEALTH CARE EDUCATION/TRAINING PROGRAM

## 2024-01-05 PROCEDURE — 80053 COMPREHEN METABOLIC PANEL: CPT

## 2024-01-05 PROCEDURE — 85025 COMPLETE CBC W/AUTO DIFF WBC: CPT

## 2024-01-05 PROCEDURE — 81001 URINALYSIS AUTO W/SCOPE: CPT

## 2024-01-05 PROCEDURE — 80061 LIPID PANEL: CPT

## 2024-01-05 PROCEDURE — 1159F MED LIST DOCD IN RCRD: CPT | Performed by: STUDENT IN AN ORGANIZED HEALTH CARE EDUCATION/TRAINING PROGRAM

## 2024-01-05 PROCEDURE — G0439 PPPS, SUBSEQ VISIT: HCPCS | Performed by: STUDENT IN AN ORGANIZED HEALTH CARE EDUCATION/TRAINING PROGRAM

## 2024-01-05 PROCEDURE — 99213 OFFICE O/P EST LOW 20 MIN: CPT | Performed by: STUDENT IN AN ORGANIZED HEALTH CARE EDUCATION/TRAINING PROGRAM

## 2024-01-05 PROCEDURE — 3074F SYST BP LT 130 MM HG: CPT | Performed by: STUDENT IN AN ORGANIZED HEALTH CARE EDUCATION/TRAINING PROGRAM

## 2024-01-05 PROCEDURE — 1170F FXNL STATUS ASSESSED: CPT | Performed by: STUDENT IN AN ORGANIZED HEALTH CARE EDUCATION/TRAINING PROGRAM

## 2024-01-05 PROCEDURE — 87086 URINE CULTURE/COLONY COUNT: CPT

## 2024-01-05 PROCEDURE — 82570 ASSAY OF URINE CREATININE: CPT

## 2024-01-05 PROCEDURE — 84443 ASSAY THYROID STIM HORMONE: CPT

## 2024-01-05 PROCEDURE — 3008F BODY MASS INDEX DOCD: CPT | Performed by: STUDENT IN AN ORGANIZED HEALTH CARE EDUCATION/TRAINING PROGRAM

## 2024-01-05 PROCEDURE — 3078F DIAST BP <80 MM HG: CPT | Performed by: STUDENT IN AN ORGANIZED HEALTH CARE EDUCATION/TRAINING PROGRAM

## 2024-01-05 PROCEDURE — 83036 HEMOGLOBIN GLYCOSYLATED A1C: CPT

## 2024-01-05 PROCEDURE — 96160 PT-FOCUSED HLTH RISK ASSMT: CPT | Performed by: STUDENT IN AN ORGANIZED HEALTH CARE EDUCATION/TRAINING PROGRAM

## 2024-01-05 PROCEDURE — 82043 UR ALBUMIN QUANTITATIVE: CPT

## 2024-01-05 PROCEDURE — 82306 VITAMIN D 25 HYDROXY: CPT

## 2024-01-05 RX ORDER — UBIDECARENONE 30 MG
CAPSULE ORAL
COMMUNITY

## 2024-01-05 NOTE — PROGRESS NOTES
Subjective:   Irma Canales is a 78 year old female who presents for a MA (Medicare Advantage) Supervisit (Once per calendar year) and scheduled follow up of multiple significant but stable problems.     She dealt with third round of breast cancer.    Did a race 6 weeks after implant surgery.    She runs, lifts weights and rebounds as well. She lost about 20lbs.    Has upcoming eye appointment next Saturday.     Was on anastrozole but did not tolerate. Stopped taking it. Not interested in further oncology follow up at this time. Not interested in breast imaging at this time. Does not want the radiation from mammography. Interested in thermography instead.     Occasional vertigo.     Taking multivitamin.    Lab Results   Component Value Date    A1C 5.5 01/27/2023    A1C 5.3 02/14/2022    A1C 5.5 09/20/2019    A1C 5.2 11/28/2018    A1C 5.3 08/08/2018        Wt Readings from Last 6 Encounters:   01/05/24 123 lb 12.8 oz (56.2 kg)   02/10/23 144 lb (65.3 kg)   01/27/23 141 lb (64 kg)   09/28/22 139 lb (63 kg)   09/08/22 137 lb (62.1 kg)   05/03/22 141 lb 1.5 oz (64 kg)         History/Other:   Fall Risk Assessment:   She has been screened for Falls and is low risk.      Cognitive Assessment:   She had a completely normal cognitive assessment - see flowsheet entries     Functional Ability/Status:   Irma Canales has a completely normal functional assessment. See flowsheet for details.        Depression Screening (PHQ-2/PHQ-9): PHQ-2 SCORE: 0, done 1/5/2024             Advanced Directives:   She does have a Living Will but we do NOT have it on file in Epic.    She does NOT have a Power of  for Health Care. [ ]  Discussed Advance Care Planning with patient (and family/surrogate if present). Standard forms made available to patient in After Visit Summary.      Patient Active Problem List   Diagnosis    Diet-controlled diabetes mellitus (HCC)    Mixed hyperlipidemia    Cataract of both eyes    Status post total  bilateral knee replacement    Age-related osteoporosis without current pathological fracture    Overweight (BMI 25.0-29.9)    Absence of right breast    Type 2 diabetes mellitus with diabetic cataract, without long-term current use of insulin ([facility])    History of breast cancer     Allergies:  She is allergic to morphine sulfate, penicillin v potassium, sulfa antibiotics, sulfonylureas, norco [hydrocodone-acetaminophen], and simvastatin.    Current Medications:  Outpatient Medications Marked as Taking for the 1/5/24 encounter (Office Visit) with Emily Fairbanks MD   Medication Sig    Multiple Vitamins-Minerals (MULTI FOR HER) Oral Tab Take by mouth.       Medical History:  She  has a past medical history of Abnormal glucose (03/30/2009), Anesthesia complication, Bell's palsy, Breast cancer ([facility]), Cancer ([facility]) (04/25/2017), Cholelithiases, High blood pressure, High cholesterol, Malignant neoplasm of upper-outer quadrant of right breast in female, estrogen receptor positive  ([facility]) (05/01/2017), Migraine, unspecified, without mention of intractable migraine without mention of status migrainosus, Mononucleosis, Mumps without mention of complication, Osteoarthritis, Other and unspecified hyperlipidemia, Recurrent breast cancer, right ([facility]) (01/05/2024), Shingles, Type 2 diabetes mellitus with hypoglycemia without coma, without long-term current use of insulin ([facility]) (01/27/2023), Unspecified essential hypertension, Vertigo, Vertigo (04/05/2017), and Visual impairment.  Surgical History:  She  has a past surgical history that includes d & c; tubal ligation; Laparoscopic cholecystectomy (N/A, 03/17/2015); cholecystectomy; Breast biopsy; total knee replacement (Left, 2009); total knee replacement (Right, 2010); arthroscopy of joint unlisted (Left); hysterectomy (01/2018); oophorectomy (Bilateral, 01/2018); other surgical history (03/14/2022); and mastectomy right (03/2022).   Family History:  Her family history includes  Breast Cancer (age of onset: 74) in her self; Breast Cancer (age of onset: 75) in her sister; Skin cancer in her mother; heart disease in her father; hip fracture (age of onset: 90) in her father; hypoplastic anemia in her mother.  Social History:  She  reports that she quit smoking about 56 years ago. Her smoking use included cigarettes. She has a 0.20 pack-year smoking history. She has never used smokeless tobacco. She reports that she does not drink alcohol and does not use drugs.    Tobacco:  She smoked tobacco in the past but quit greater than 12 months ago.  Social History    Tobacco Use      Smoking status: Former        Packs/day: 0.20        Years: 1.00        Additional pack years: 0.00        Total pack years: 0.20        Types: Cigarettes        Quit date: 1968        Years since quittin.0      Smokeless tobacco: Never      Tobacco comments: in high school for 1 year         CAGE Alcohol Screen:   CAGE screening score of 0 on 2024, showing low risk of alcohol abuse.      Patient Care Team:  Emily Fairbanks MD as PCP - General (Family Medicine)  Casey Alcaraz MD as Consulting Physician (NEUROLOGY)  Rosalina Harris MD (Radiation Oncology)  Gregoria Benedict, RN as Registered Nurse (Registered Nurse)  Giuseppe Lunsford APRN as Registered Nurse (Registered Nurse)  Aicha Chow MD (OBSTETRICS & GYNECOLOGY)  Timothy Chan PT as Physical Therapist  Chey Amato, RN as Registered Nurse (Registered Nurse)  Andi Tamez MD as Consulting Physician (SURGERY, PLASTIC)  Jamal Dash MD as Consulting Physician (ONCOLOGY)  Jose Alberto Strickland MD as Consulting Physician (SURGERY, GENERAL)    Review of Systems  GENERAL: feels well otherwise  SKIN: denies any unusual skin lesions  EYES: denies blurred vision or double vision  HEENT: denies nasal congestion, sinus pain or ST  LUNGS: denies shortness of breath with exertion  CARDIOVASCULAR: denies chest pain on exertion  GI:  denies abdominal pain, denies heartburn  : denies dysuria, vaginal discharge or itching, no complaint of urinary incontinence   MUSCULOSKELETAL: denies back pain  NEURO: denies headaches  PSYCHE: denies depression or anxiety  HEMATOLOGIC: denies hx of anemia  ENDOCRINE: denies thyroid history  ALL/ASTHMA: denies hx of allergy or asthma    Objective:   Physical Exam  General Appearance:  Alert, cooperative, no distress, appears stated age   Head:  Normocephalic, without obvious abnormality, atraumatic   Eyes:  PERRL, conjunctiva/corneas clear, EOM's intact both eyes   Ears:  Normal TM's and external ear canals, both ears   Nose: Nares normal, septum midline,mucosa normal   Throat: Lips, mucosa, and tongue normal; teeth and gums normal   Neck: Supple, symmetrical, trachea midline, no adenopathy;  thyroid: not enlarged, symmetric, no tenderness/mass/nodules   Back:   Symmetric, no curvature, ROM normal, no CVA tenderness   Lungs:   Clear to auscultation bilaterally, respirations unlabored   Heart:  Regular rate and rhythm, S1 and S2 normal, no murmur, rub, or gallop   Abdomen:   Soft, non-tender, bowel sounds active all four quadrants,  no masses, no organomegaly   Pelvic: Deferred   Extremities: Extremities normal, atraumatic, no cyanosis or edema   Pulses: 2+ and symmetric   Skin: Skin color, texture, turgor normal, no rashes or lesions   Lymph nodes: Cervical, supraclavicular nodes normal   Neurologic: Normal       /60 (BP Location: Left arm, Patient Position: Sitting, Cuff Size: adult)   Pulse 72   Temp 97.5 °F (36.4 °C) (Temporal)   Resp 16   Ht 5' 1.42\" (1.56 m)   Wt 123 lb 12.8 oz (56.2 kg)   LMP  (LMP Unknown)   BMI 23.08 kg/m²  Estimated body mass index is 23.08 kg/m² as calculated from the following:    Height as of this encounter: 5' 1.42\" (1.56 m).    Weight as of this encounter: 123 lb 12.8 oz (56.2 kg).    Medicare Hearing Assessment:   Hearing Screening    Screening Method: Finger  Rub  Finger Rub Result: Pass               Visual Acuity:   Right Eye Visual Acuity: Corrected Right Eye Chart Acuity: 20/40   Left Eye Visual Acuity: Corrected Left Eye Chart Acuity: 20/40   Both Eyes Visual Acuity: Corrected Both Eyes Chart Acuity: 20/40   Able To Tolerate Visual Acuity: Yes        Assessment & Plan:   Irma Canales is a 78 year old female who presents for a Medicare Assessment.     1. Encounter for Medicare annual wellness exam (Primary)  2. Diet-controlled diabetes mellitus (HCC)  Has been diet controlled for years. Will recheck labs.  -     CBC With Differential With Platelet; Future; Expected date: 01/05/2024  -     Comp Metabolic Panel (14); Future; Expected date: 01/05/2024  -     Lipid Panel; Future; Expected date: 01/05/2024  -     TSH W Reflex To Free T4; Future; Expected date: 01/05/2024  -     Urinalysis with Culture Reflex; Future; Expected date: 01/05/2024  -     Hemoglobin A1C; Future; Expected date: 01/05/2024  -     Microalb/Creat Ratio, Random Urine; Future; Expected date: 01/05/2024  3. Age-related osteoporosis without current pathological fracture  Will recheck in April 2024  -     XR DEXA BONE DENSITOMETRY (CPT=77080); Future; Expected date: 04/22/2024  -     Vitamin D; Future; Expected date: 01/05/2024  4. Status post total bilateral knee replacement  Stable, running races  5. Type 2 diabetes mellitus with diabetic cataract, without long-term current use of insulin (HCC)  Doing well  6. Mixed hyperlipidemia  Not on medication, will follow  7. History of breast cancer  Patient not interested in further evaluation or treatment or imaging at this time  Overview:  Patient is not interested in further evaluation or treatment    The patient indicates understanding of these issues and agrees to the plan.  Reinforced healthy diet, lifestyle, and exercise.      Return in 6 months (on 7/5/2024) for diabetes follow up, or sooner if needed.     Emily Fairbanks MD, 1/5/2024      Supplementary Documentation:   General Health:  In the past six months, have you lost more than 10 pounds without trying?: 2 - No  Has your appetite been poor?: No  Type of Diet: Vegetarian  How does the patient maintain a good energy level?: Appropriate Exercise  How would you describe your daily physical activity?: Moderate  How would you describe your current health state?: Good  How do you maintain positive mental well-being?: Social Interaction;Visiting Friends;Visiting Family  On a scale of 0 to 10, with 0 being no pain and 10 being severe pain, what is your pain level?: 0 - (None)  In the past six months, have you experienced urine leakage?: 0-No  At any time do you feel concerned for the safety/well-being of yourself and/or your children, in your home or elsewhere?: No  Have you had any immunizations at another office such as Influenza, Hepatitis B, Tetanus, or Pneumococcal?: No         Irma Canales's SCREENING SCHEDULE   Tests on this list are recommended by your physician but may not be covered, or covered at this frequency, by your insurer.   Please check with your insurance carrier before scheduling to verify coverage.   PREVENTATIVE SERVICES FREQUENCY &  COVERAGE DETAILS LAST COMPLETION DATE   Diabetes Screening    Fasting Blood Sugar /  Glucose    One screening every 12 months if never tested or if previously tested but not diagnosed with pre-diabetes   One screening every 6 months if diagnosed with pre-diabetes Lab Results   Component Value Date     (H) 01/27/2023        Cardiovascular Disease Screening    Lipid Panel  Cholesterol  Lipoprotein (HDL)  Triglycerides Covered every 5 years for all Medicare beneficiaries without apparent signs or symptoms of cardiovascular disease Lab Results   Component Value Date    CHOLEST 255 (H) 01/27/2023    HDL 67 (H) 01/27/2023     (H) 01/27/2023    TRIG 154 (H) 01/27/2023         Electrocardiogram (EKG)   Covered if needed at Welcome to  Medicare, and non-screening if indicated for medical reasons 09/09/2022      Ultrasound Screening for Abdominal Aortic Aneurysm (AAA) Covered once in a lifetime for one of the following risk factors    Men who are 65-75 years old and have ever smoked    Anyone with a family history -     Colorectal Cancer Screening  Covered for ages 50-85; only need ONE of the following:    Colonoscopy   Covered every 10 years    Covered every 2 years if patient is at high risk or previous colonoscopy was abnormal -    Health Maintenance   Topic Date Due    Colorectal Cancer Screening  Discontinued       Flexible Sigmoidoscopy   Covered every 4 years -    Fecal Occult Blood Test Covered annually -   Bone Density Screening    Bone density screening    Covered every 2 years after age 65 if diagnosed with risk of osteoporosis or estrogen deficiency.    Covered yearly for long-term glucocorticoid medication use (Steroids) Last Dexa Scan:    XR DEXA BONE DENSITOMETRY (CPT=77080) 04/22/2022      No recommendations at this time   Pap and Pelvic    Pap   Covered every 2 years for women at normal risk; Annually if at high risk -  No recommendations at this time    Chlamydia Annually if high risk -  No recommendations at this time   Screening Mammogram    Mammogram     Recommend annually for all female patients aged 40 and older    One baseline mammogram covered for patients aged 35-39 05/22/2023    Health Maintenance   Topic Date Due    Mammogram  Discontinued       Immunizations    Influenza Covered once per flu season  Please get every year -  No recommendations at this time    Pneumococcal Each vaccine ([id number] & [id number]) covered once after 65 Prevnar 13: 04/05/2017    Jthgecjev16: 05/07/2013     No recommendations at this time    Hepatitis B One screening covered for patients with certain risk factors   -  No recommendations at this time    Tetanus Toxoid Not covered by Medicare Part B unless medically necessary (cut with metal);  may be covered with your pharmacy prescription benefits -    Tetanus, Diptheria and Pertusis TD and TDaP Not covered by Medicare Part B -  No recommendations at this time    Zoster Not covered by Medicare Part B; may be covered with your pharmacy  prescription benefits -  Zoster Vaccines(1 of 2) Never done

## 2024-01-05 NOTE — PATIENT INSTRUCTIONS
Irma Canales's SCREENING SCHEDULE   Tests on this list are recommended by your physician but may not be covered, or covered at this frequency, by your insurer.   Please check with your insurance carrier before scheduling to verify coverage.   PREVENTATIVE SERVICES FREQUENCY &  COVERAGE DETAILS LAST COMPLETION DATE   Diabetes Screening    Fasting Blood Sugar /  Glucose    One screening every 12 months if never tested or if previously tested but not diagnosed with pre-diabetes   One screening every 6 months if diagnosed with pre-diabetes Lab Results   Component Value Date     (H) 01/27/2023        Cardiovascular Disease Screening    Lipid Panel  Cholesterol  Lipoprotein (HDL)  Triglycerides Covered every 5 years for all Medicare beneficiaries without apparent signs or symptoms of cardiovascular disease Lab Results   Component Value Date    CHOLEST 255 (H) 01/27/2023    HDL 67 (H) 01/27/2023     (H) 01/27/2023    TRIG 154 (H) 01/27/2023         Electrocardiogram (EKG)   Covered if needed at Welcome to Medicare, and non-screening if indicated for medical reasons 09/09/2022      Ultrasound Screening for Abdominal Aortic Aneurysm (AAA) Covered once in a lifetime for one of the following risk factors    Men who are 65-75 years old and have ever smoked    Anyone with a family history -     Colorectal Cancer Screening  Covered for ages 50-85; only need ONE of the following:    Colonoscopy   Covered every 10 years    Covered every 2 years if patient is at high risk or previous colonoscopy was abnormal -    Health Maintenance   Topic Date Due    Colorectal Cancer Screening  Discontinued       Flexible Sigmoidoscopy   Covered every 4 years -    Fecal Occult Blood Test Covered annually -   Bone Density Screening    Bone density screening    Covered every 2 years after age 65 if diagnosed with risk of osteoporosis or estrogen deficiency.    Covered yearly for long-term glucocorticoid medication use (Steroids)  Last Dexa Scan:    XR DEXA BONE DENSITOMETRY (CPT=77080) 04/22/2022      No recommendations at this time   Pap and Pelvic    Pap   Covered every 2 years for women at normal risk; Annually if at high risk -  No recommendations at this time    Chlamydia Annually if high risk -  No recommendations at this time   Screening Mammogram    Mammogram     Recommend annually for all female patients aged 40 and older    One baseline mammogram covered for patients aged 35-39 05/22/2023    Health Maintenance   Topic Date Due    Mammogram  Discontinued       Immunizations    Influenza Covered once per flu season  Please get every year -  Influenza Vaccine(1) due on 10/01/2023    Pneumococcal Each vaccine ([id number] & [id number]) covered once after 65 Prevnar 13: 04/05/2017    [id number]: 05/07/2013     No recommendations at this time    Hepatitis B One screening covered for patients with certain risk factors   -  No recommendations at this time    Tetanus Toxoid Not covered by Medicare Part B unless medically necessary (cut with metal); may be covered with your pharmacy prescription benefits -    Tetanus, Diptheria and Pertusis TD and TDaP Not covered by Medicare Part B -  No recommendations at this time    Zoster Not covered by Medicare Part B; may be covered with your pharmacy  prescription benefits -  Zoster Vaccines(1 of 2) Never done        Information about healthcare power of     https://[facility].[place].gov/content/delisa/sogabino/en/web/idph/files/forms/powerofattorneyhealthcareform.pdf    https://[facility].[place].gov/topics-services/health-care-regulation/nursing-homes/advance-directives.html

## 2024-06-19 NOTE — ANESTHESIA PROCEDURE NOTES
Health Maintenance       COVID-19 Vaccine (3 - 2023-24 season)  Overdue since 9/1/2023           Following review of the above:  Patient is not proceeding with: COVID-19    Note: Refer to final orders and clinician documentation.       Regional Block  Performed by: Samaria Cheung CRNA  Authorized by: Didier Seo MD       General Information and Staff    Start Time:  3/14/2022 10:30 AM  End Time:  3/14/2022 10:35 AM  Anesthesiologist:  Didier Seo MD  CRNA:  Leena Banda CRNA  Performed by: Anesthesiologist  Patient Location:  OR    Block Placement: Post Induction  Site Identification: image stored and retrievable    Block site/laterality marked before start: site marked  Reason for Block: at surgeon's request and post-op pain management    Preanesthetic Checklist: 2 patient identifers, IV checked, site marked, risks and benefits discussed, monitors and equipment checked, pre-op evaluation, timeout performed, anesthesia consent, sterile technique used, no prohibitive neurological deficits and no local skin infection at insertion site      Procedure Details    Patient Position:  Supine  Prep: ChloraPrep    Monitoring:  Cardiac monitor  Block Type:  PEC1  Laterality:  Right  Injection Technique:  Single-shot    Needle    Needle Type:  Echogenic  Needle Gauge:  21 G  Needle Length:  110 mm  Needle Localization:  Ultrasound guidance  Reason for Ultrasound Use: appropriate spread of the medication was noted in real time            Assessment    Injection Assessment:  Good spread noted, low pressure, negative aspiration for heme, negative resistance and no pain on injection  Heart Rate Change: No    - Patient tolerated block procedure well without evidence of immediate block related complications.      Medications      Additional Comments    30cc 0.25% bupivicaine with 2 mg PF decadron injected into right side

## 2024-10-21 ENCOUNTER — V-VISIT (OUTPATIENT)
Dept: URGENT CARE | Age: 79
End: 2024-10-21

## 2024-10-21 VITALS
SYSTOLIC BLOOD PRESSURE: 108 MMHG | HEART RATE: 71 BPM | BODY MASS INDEX: 23 KG/M2 | OXYGEN SATURATION: 100 % | DIASTOLIC BLOOD PRESSURE: 58 MMHG | TEMPERATURE: 98.6 F | WEIGHT: 125 LBS | RESPIRATION RATE: 18 BRPM | HEIGHT: 62 IN

## 2024-10-21 DIAGNOSIS — H61.23 BILATERAL IMPACTED CERUMEN: Primary | ICD-10-CM

## 2024-10-21 PROCEDURE — 99213 OFFICE O/P EST LOW 20 MIN: CPT | Performed by: NURSE PRACTITIONER

## 2024-10-21 ASSESSMENT — PAIN SCALES - GENERAL: PAINLEVEL: 7

## 2024-12-23 PROBLEM — I96 SKIN NECROSIS (HCC): Status: RESOLVED | Noted: 2024-12-23 | Resolved: 2024-12-23

## 2025-01-03 ENCOUNTER — LAB ENCOUNTER (OUTPATIENT)
Dept: LAB | Age: 80
End: 2025-01-03
Attending: STUDENT IN AN ORGANIZED HEALTH CARE EDUCATION/TRAINING PROGRAM
Payer: MEDICARE

## 2025-01-03 ENCOUNTER — OFFICE VISIT (OUTPATIENT)
Dept: FAMILY MEDICINE CLINIC | Facility: CLINIC | Age: 80
End: 2025-01-03
Payer: MEDICARE

## 2025-01-03 VITALS
SYSTOLIC BLOOD PRESSURE: 110 MMHG | RESPIRATION RATE: 16 BRPM | HEIGHT: 61 IN | BODY MASS INDEX: 24.39 KG/M2 | OXYGEN SATURATION: 98 % | DIASTOLIC BLOOD PRESSURE: 72 MMHG | WEIGHT: 129.19 LBS | HEART RATE: 74 BPM | TEMPERATURE: 98 F

## 2025-01-03 DIAGNOSIS — M81.0 AGE-RELATED OSTEOPOROSIS WITHOUT CURRENT PATHOLOGICAL FRACTURE: ICD-10-CM

## 2025-01-03 DIAGNOSIS — E11.9 DIET-CONTROLLED DIABETES MELLITUS (HCC): ICD-10-CM

## 2025-01-03 DIAGNOSIS — Z00.00 ENCOUNTER FOR MEDICARE ANNUAL WELLNESS EXAM: Primary | ICD-10-CM

## 2025-01-03 DIAGNOSIS — Z96.653 STATUS POST TOTAL BILATERAL KNEE REPLACEMENT: ICD-10-CM

## 2025-01-03 DIAGNOSIS — E11.36 TYPE 2 DIABETES MELLITUS WITH DIABETIC CATARACT, WITHOUT LONG-TERM CURRENT USE OF INSULIN (HCC): ICD-10-CM

## 2025-01-03 DIAGNOSIS — Z85.3 HISTORY OF BREAST CANCER: ICD-10-CM

## 2025-01-03 DIAGNOSIS — E11.69 DYSLIPIDEMIA DUE TO TYPE 2 DIABETES MELLITUS (HCC): ICD-10-CM

## 2025-01-03 DIAGNOSIS — H26.9 CATARACT OF BOTH EYES, UNSPECIFIED CATARACT TYPE: ICD-10-CM

## 2025-01-03 DIAGNOSIS — E78.5 DYSLIPIDEMIA DUE TO TYPE 2 DIABETES MELLITUS (HCC): ICD-10-CM

## 2025-01-03 DIAGNOSIS — Z90.11 ABSENCE OF RIGHT BREAST: ICD-10-CM

## 2025-01-03 PROBLEM — E66.3 OVERWEIGHT (BMI 25.0-29.9): Status: RESOLVED | Noted: 2021-02-03 | Resolved: 2025-01-03

## 2025-01-03 LAB
ALBUMIN SERPL-MCNC: 4.7 G/DL (ref 3.2–4.8)
ALBUMIN/GLOB SERPL: 1.4 {RATIO} (ref 1–2)
ALP LIVER SERPL-CCNC: 67 U/L
ALT SERPL-CCNC: 23 U/L
ANION GAP SERPL CALC-SCNC: 10 MMOL/L (ref 0–18)
AST SERPL-CCNC: 29 U/L (ref ?–34)
BASOPHILS # BLD AUTO: 0.03 X10(3) UL (ref 0–0.2)
BASOPHILS NFR BLD AUTO: 0.9 %
BILIRUB SERPL-MCNC: 1 MG/DL (ref 0.2–1.1)
BUN BLD-MCNC: 14 MG/DL (ref 9–23)
CALCIUM BLD-MCNC: 10.2 MG/DL (ref 8.7–10.4)
CHLORIDE SERPL-SCNC: 106 MMOL/L (ref 98–112)
CHOLEST SERPL-MCNC: 232 MG/DL (ref ?–200)
CO2 SERPL-SCNC: 25 MMOL/L (ref 21–32)
CREAT BLD-MCNC: 0.72 MG/DL
CREAT UR-SCNC: 27.1 MG/DL
EGFRCR SERPLBLD CKD-EPI 2021: 86 ML/MIN/1.73M2 (ref 60–?)
EOSINOPHIL # BLD AUTO: 0.03 X10(3) UL (ref 0–0.7)
EOSINOPHIL NFR BLD AUTO: 0.9 %
ERYTHROCYTE [DISTWIDTH] IN BLOOD BY AUTOMATED COUNT: 12 %
EST. AVERAGE GLUCOSE BLD GHB EST-MCNC: 94 MG/DL (ref 68–126)
FASTING PATIENT LIPID ANSWER: YES
FASTING STATUS PATIENT QL REPORTED: YES
GLOBULIN PLAS-MCNC: 3.4 G/DL (ref 2–3.5)
GLUCOSE BLD-MCNC: 87 MG/DL (ref 70–99)
HBA1C MFR BLD: 4.9 % (ref ?–5.7)
HCT VFR BLD AUTO: 44.5 %
HDLC SERPL-MCNC: 61 MG/DL (ref 40–59)
HGB BLD-MCNC: 14.9 G/DL
IMM GRANULOCYTES # BLD AUTO: 0.01 X10(3) UL (ref 0–1)
IMM GRANULOCYTES NFR BLD: 0.3 %
LDLC SERPL CALC-MCNC: 154 MG/DL (ref ?–100)
LYMPHOCYTES # BLD AUTO: 0.96 X10(3) UL (ref 1–4)
LYMPHOCYTES NFR BLD AUTO: 28.7 %
MCH RBC QN AUTO: 32.5 PG (ref 26–34)
MCHC RBC AUTO-ENTMCNC: 33.5 G/DL (ref 31–37)
MCV RBC AUTO: 97.2 FL
MICROALBUMIN UR-MCNC: 0.3 MG/DL
MICROALBUMIN/CREAT 24H UR-RTO: 11.1 UG/MG (ref ?–30)
MONOCYTES # BLD AUTO: 0.33 X10(3) UL (ref 0.1–1)
MONOCYTES NFR BLD AUTO: 9.9 %
NEUTROPHILS # BLD AUTO: 1.99 X10 (3) UL (ref 1.5–7.7)
NEUTROPHILS # BLD AUTO: 1.99 X10(3) UL (ref 1.5–7.7)
NEUTROPHILS NFR BLD AUTO: 59.3 %
NONHDLC SERPL-MCNC: 171 MG/DL (ref ?–130)
OSMOLALITY SERPL CALC.SUM OF ELEC: 292 MOSM/KG (ref 275–295)
PLATELET # BLD AUTO: 201 10(3)UL (ref 150–450)
POTASSIUM SERPL-SCNC: 4.3 MMOL/L (ref 3.5–5.1)
PROT SERPL-MCNC: 8.1 G/DL (ref 5.7–8.2)
RBC # BLD AUTO: 4.58 X10(6)UL
SODIUM SERPL-SCNC: 141 MMOL/L (ref 136–145)
TRIGL SERPL-MCNC: 98 MG/DL (ref 30–149)
TSI SER-ACNC: 2.93 UIU/ML (ref 0.55–4.78)
VLDLC SERPL CALC-MCNC: 19 MG/DL (ref 0–30)
WBC # BLD AUTO: 3.4 X10(3) UL (ref 4–11)

## 2025-01-03 PROCEDURE — 82043 UR ALBUMIN QUANTITATIVE: CPT

## 2025-01-03 PROCEDURE — 80053 COMPREHEN METABOLIC PANEL: CPT

## 2025-01-03 PROCEDURE — 84443 ASSAY THYROID STIM HORMONE: CPT

## 2025-01-03 PROCEDURE — 83036 HEMOGLOBIN GLYCOSYLATED A1C: CPT

## 2025-01-03 PROCEDURE — 80061 LIPID PANEL: CPT

## 2025-01-03 PROCEDURE — 82570 ASSAY OF URINE CREATININE: CPT

## 2025-01-03 PROCEDURE — 85025 COMPLETE CBC W/AUTO DIFF WBC: CPT

## 2025-01-03 PROCEDURE — 36415 COLL VENOUS BLD VENIPUNCTURE: CPT

## 2025-01-03 NOTE — PROGRESS NOTES
Subjective:   Irma Canales is a 79 year old female who presents for a MA AHA (Medicare Advantage Annual Health Assessment) and Medicare Subsequent Annual Wellness visit (Pt already had Initial Annual Wellness) and scheduled follow up of multiple significant but stable problems.     Med/Surg/Allergy/Fam hx updates: going well  Home: going well  Activities/Hobbies: yes, knitting, crocheting, quilting  Diet: healthy diet, appetite good - vegan; staying hydrated; no caffeine  Exercise: 2 miles a day 5 days a week  Sleep: 6 hours - 9 hours - feels rested  Mood: good  Habits: Denied alcohol, tobacco, or drug use  Periods: No LMP recorded (lmp unknown). Patient has had a hysterectomy.  Immunizations: declined flu shot    Not taking medication at this time. Was on letrozole for a month. It affected her energy.    Patient presents for diabetes follow up. Working on diet and exercise. No low blood sugars noted.   Not currently on Diabetic meds.Meds : Kept the same and No medications needed at this time    Lab Results   Component Value Date    A1C 5.0 01/05/2024    A1C 5.5 01/27/2023    A1C 5.3 02/14/2022    A1C 5.5 09/20/2019    A1C 5.2 11/28/2018      Last Diabetic Eye Exam: upcoming in January  No data recorded  No data recorded    Last Cr was 0.64 done on 1/5/2024.Last eGFR was 92 on 1/5/2024.    Patient presents for follow up of dyslipidemia. Working on diet and exercise.   Cholesterol: 191, done on 1/5/2024.  HDL Cholesterol: 70, done on 1/5/2024.  LDL Cholesterol: 105, done on 1/5/2024.  TriGlycerides 86, done on 1/5/2024.    Hx two knee replacements and they were checked. Tendonitis in left knee. Took a break from exercise. Follows up with ortho every 5 years.    Wt Readings from Last 6 Encounters:   01/03/25 129 lb 3.2 oz (58.6 kg)   01/05/24 123 lb 12.8 oz (56.2 kg)   02/10/23 144 lb (65.3 kg)   01/27/23 141 lb (64 kg)   09/28/22 139 lb (63 kg)   09/08/22 137 lb (62.1 kg)       History/Other:   Fall Risk  Assessment:   She has been screened for Falls and is low risk.    Two weeks ago fell up the steps, missed last step. No injury. Iced it right away and took tylenol for a day. Saw ortho afterwards and was diagnosed with tendonitis.     Cognitive Assessment:   She had a completely normal cognitive assessment - see flowsheet entries     Functional Ability/Status:   Irma Canales has a completely normal functional assessment. See flowsheet for details.    Depression Screening (PHQ):  PHQ-2 SCORE: 0  , done 1/3/2025   Last [place] Suicide Screening on 1/3/2025 was No Risk.    Advanced Directives:   She does NOT have a Living Will. [Do you have a living will?: (Patient-Rptd) No]  She does have a POA but we do NOT have it on file in Epic.  Has will and POA.  Patient has Advance Care Planning documents but we do not have a copy in EMR. Discussed Advanced Care Planning with patient and instructed patient to get our office a copy to be scanned into EMR.      Patient Active Problem List   Diagnosis    Diet-controlled diabetes mellitus (HCC)    Mixed hyperlipidemia    Cataract of both eyes    Status post total bilateral knee replacement    Age-related osteoporosis without current pathological fracture    Absence of right breast    Type 2 diabetes mellitus with diabetic cataract, without long-term current use of insulin (HCC)    History of breast cancer     Allergies:  She is allergic to morphine sulfate, penicillin v potassium, sulfa antibiotics, sulfonylureas, norco [hydrocodone-acetaminophen], and simvastatin.    Current Medications:  Outpatient Medications Marked as Taking for the 1/3/25 encounter (Office Visit) with Emily Fairbanks MD   Medication Sig    Multiple Vitamins-Minerals (MULTI FOR HER) Oral Tab Take by mouth.       Medical History:  She  has a past medical history of Abnormal glucose (03/30/2009), Anesthesia complication, Bell's palsy, Breast cancer (HCC), Cancer (HCC) (04/25/2017), Cholelithiases, High  blood pressure, High cholesterol, Malignant neoplasm of upper-outer quadrant of right breast in female, estrogen receptor positive ([facility]) (05/01/2017), Migraine, unspecified, without mention of intractable migraine without mention of status migrainosus, Mononucleosis, Mumps without mention of complication, Osteoarthritis, Other and unspecified hyperlipidemia, Overweight (BMI 25.0-29.9) (02/03/2021), Recurrent breast cancer, right ([facility]) (01/05/2024), Shingles, Type 2 diabetes mellitus with hypoglycemia without coma, without long-term current use of insulin ([facility]) (01/27/2023), Unspecified essential hypertension, Vertigo, Vertigo (04/05/2017), and Visual impairment.  Surgical History:  She  has a past surgical history that includes d & c; tubal ligation; Laparoscopic cholecystectomy (N/A, 03/17/2015); cholecystectomy; Breast biopsy; total knee replacement (Left, 2009); total knee replacement (Right, 2010); arthroscopy of joint unlisted (Left); hysterectomy (01/2018); oophorectomy (Bilateral, 01/2018); other surgical history (03/14/2022); and mastectomy right (03/2022).   Family History:  Her family history includes Breast Cancer (age of onset: 74) in her self; Breast Cancer (age of onset: 75) in her sister; Skin cancer in her mother; heart disease in her father; hip fracture (age of onset: 90) in her father; hypoplastic anemia in her mother.  Social History:  She  reports that she quit smoking about 57 years ago. Her smoking use included cigarettes. She started smoking about 58 years ago. She has a 0.2 pack-year smoking history. She has never used smokeless tobacco. She reports that she does not drink alcohol and does not use drugs.    Tobacco:  She smoked tobacco in the past but quit greater than 12 months ago.  Social History     Tobacco Use   Smoking Status Former    Current packs/day: 0.00    Average packs/day: 0.2 packs/day for 1 year (0.2 ttl pk-yrs)    Types: Cigarettes    Start date: 1/12/1967    Quit date:  1968    Years since quittin.0   Smokeless Tobacco Never   Tobacco Comments    in high school for 1 year          CAGE Alcohol Screen:   CAGE screening score of 0 on 2025, showing low risk of alcohol abuse.      Patient Care Team:  Emily Fairbanks MD as PCP - General (Family Medicine)  Casey Alcaraz MD as Consulting Physician (NEUROLOGY)  Rosalina Harirs MD (Radiation Oncology)  Gregoria Benedict, RN as Registered Nurse (Registered Nurse)  Giuseppe Lunsford APRN as Registered Nurse (Registered Nurse)  Aicha Chow MD (OBSTETRICS & GYNECOLOGY)  Timothy Chan PT as Physical Therapist  Chey Amato, RN as Registered Nurse (Registered Nurse)  Andi Tamez MD as Consulting Physician (SURGERY, PLASTIC)  Jamal Dash MD as Consulting Physician (ONCOLOGY)  Jose Alberto Strickland MD as Consulting Physician (SURGERY, GENERAL)    Review of Systems  GENERAL: feels well otherwise  SKIN: denies any unusual skin lesions  EYES: denies blurred vision or double vision  HEENT: denies nasal congestion, sinus pain or ST  LUNGS: denies shortness of breath with exertion  CARDIOVASCULAR: denies chest pain on exertion  GI: denies abdominal pain, denies heartburn  : denies dysuria, vaginal discharge or itching, no complaint of urinary incontinence   MUSCULOSKELETAL: denies back pain  NEURO: denies headaches  PSYCHE: denies depression or anxiety  HEMATOLOGIC: denies hx of anemia  ENDOCRINE: denies thyroid history  ALL/ASTHMA: + hx of allergy or asthma    Objective:   Physical Exam  General Appearance:  Alert, cooperative, no distress, appears stated age   Head:  Normocephalic, without obvious abnormality, atraumatic   Eyes:  PERRL, conjunctiva/corneas clear, EOM's intact both eyes   Ears:  Normal TM's and external ear canals, both ears   Nose: Nares normal, septum midline,mucosa normal, no drainage   Throat: Lips, mucosa, and tongue normal; teeth and gums normal   Neck: Supple, symmetrical, trachea  midline, no adenopathy;  thyroid: not enlarged, symmetric, no tenderness/mass/nodules; no JVD   Back:   Symmetric, no curvature, ROM normal   Lungs:   Clear to auscultation bilaterally, respirations unlabored   Heart:  Regular rate and rhythm, S1 and S2 normal, no murmur, rub, or gallop   Abdomen:   Soft, non-tender, bowel sounds active all four quadrants,  no masses, no organomegaly   Pelvic: Deferred   Extremities: Extremities normal, atraumatic, no cyanosis or edema   Pulses: 2+ and symmetric   Skin: Skin color, texture, turgor normal, no rashes or lesions   Lymph nodes: Cervical nodes normal   Neurologic: Normal         /72   Pulse 74   Temp 98.2 °F (36.8 °C) (Oral)   Resp 16   Ht 5' 1\" (1.549 m)   Wt 129 lb 3.2 oz (58.6 kg)   LMP  (LMP Unknown)   SpO2 98%   BMI 24.41 kg/m²  Estimated body mass index is 24.41 kg/m² as calculated from the following:    Height as of this encounter: 5' 1\" (1.549 m).    Weight as of this encounter: 129 lb 3.2 oz (58.6 kg).    Medicare Hearing Assessment:   Hearing Screening    Time taken: 1/3/2025  7:57 AM  Screening Method: Finger Rub  Finger Rub Result: Pass         Visual Acuity:     Right Eye Chart Acuity: 20/25     Left Eye Chart Acuity: 20/25     Both Eyes Chart Acuity: 20/20   Able To Tolerate Visual Acuity: Yes        Assessment & Plan:   Irma Canales is a 79 year old female who presents for a Medicare Assessment.     1. Encounter for Medicare annual wellness exam (Primary)  2. Type 2 diabetes mellitus with diabetic cataract, without long-term current use of insulin (HCC)  Pt presents for diabetes follow up  - Control: well controlled, due for labs  - Eye exam: this month  - BP: at goal  - Lipids: to recheck  - labs today and again in 6 months to follow  -     Hemoglobin A1C; Future; Expected date: 01/03/2025  -     Comp Metabolic Panel (14); Future; Expected date: 07/02/2025  -     Lipid Panel; Future; Expected date: 07/02/2025  -     Hemoglobin A1C;  Future; Expected date: 07/02/2025  -     Microalb/Creat Ratio, Random Urine; Future; Expected date: 01/03/2025  3. Diet-controlled diabetes mellitus (HCC)  -     TSH W Reflex To Free T4; Future; Expected date: 01/03/2025  -     Lipid Panel; Future; Expected date: 01/03/2025  -     Comp Metabolic Panel (14); Future; Expected date: 01/03/2025  -     CBC With Differential With Platelet; Future; Expected date: 01/03/2025  -     Hemoglobin A1C; Future; Expected date: 01/03/2025  -     Microalb/Creat Ratio, Random Urine; Future; Expected date: 01/03/2025  4. Dyslipidemia due to type 2 diabetes mellitus (HCC)  -     Lipid Panel; Future; Expected date: 01/03/2025  -     Comp Metabolic Panel (14); Future; Expected date: 07/02/2025  -     Lipid Panel; Future; Expected date: 07/02/2025  5. Age-related osteoporosis without current pathological fracture  Declined dexa scan this year, consider next year  6. Status post total bilateral knee replacement  Following with ortho, doing well, appreciate evaluation and recommendations  7. History of breast cancer  Patient confirms desire to not pursue further workup or treatment  Overview:  Patient is not interested in further evaluation or treatment  8. Absence of right breast  9. Cataract of both eyes, unspecified cataract type  Monitoring per ophthalmology, appreciate evaluation and recommendations    The patient indicates understanding of these issues and agrees to the plan.  Reinforced healthy diet, lifestyle, and exercise.      Return in 1 year (on 1/3/2026) for Medicare Annual Wellness Visit, or sooner if needed.     Emily Fairbanks MD, 1/3/2025     Supplementary Documentation:   General Health:  In the past six months, have you lost more than 10 pounds without trying?: (Patient-Rptd) 2 - No  Has your appetite been poor?: (Patient-Rptd) No  Type of Diet: (Patient-Rptd) Vegetarian  How does the patient maintain a good energy level?: (Patient-Rptd) Appropriate Exercise;Daily  Walks  How would you describe your daily physical activity?: (Patient-Rptd) Moderate  How would you describe your current health state?: (Patient-Rptd) Good  How do you maintain positive mental well-being?: (Patient-Rptd) Social Interaction;Games;Visiting Friends;Visiting Family  On a scale of 0 to 10, with 0 being no pain and 10 being severe pain, what is your pain level?: (Patient-Rptd) 0 - (None)  In the past six months, have you experienced urine leakage?: (Patient-Rptd) 0-No  At any time do you feel concerned for the safety/well-being of yourself and/or your children, in your home or elsewhere?: (Patient-Rptd) No  Have you had any immunizations at another office such as Influenza, Hepatitis B, Tetanus, or Pneumococcal?: (Patient-Rptd) No    Health Maintenance   Topic Date Due    Zoster Vaccines (1 of 2) Never done    COVID-19 Vaccine (1 - 2024-25 season) Never done    Influenza Vaccine (1) 10/01/2024    Annual Well Visit  01/01/2025    DEXA Scan  Completed    Annual Depression Screening  Completed    Fall Risk Screening (Annual)  Completed    Pneumococcal Vaccine: 65+ Years  Completed    Colorectal Cancer Screening  Discontinued    Mammogram  Discontinued

## 2025-01-03 NOTE — PATIENT INSTRUCTIONS
Refill policies:      Allow 3 business days for refills; controlled substances may take longer.  Contact your pharmacy at least 5-7 business days prior to running out of medication and have them send an electronic request or submit through the \"request refill\" option thru your [employer] account. No need to do both, as multiple requests will create an automated [employer] message to notify of a denial for one of the duplicated requests, causing you undue confusion.   Refills are NOT addressed on weekends; covering physicians do not authorize routine medications on weekends.  No narcotics or controlled substances are refilled after noon on Fridays or by on call physicians.  By law, narcotics cannot be faxed or phoned into your pharmacy.  If your prescription is due for a refill, you may be due for a follow up appointment. Please call our office at [phone] to make an appointment or schedule an appointment via [employer].  To best provide you care, patients receiving routine medications need to be seen at least twice a year. Patients receiving narcotic/controlled substance medications need to be seen at least once every 3 months.  In the event that your preferred pharmacy does not have the requested medication in stock (ie Backordered), it is your responsibility to find another pharmacy that has the requested medication available. We will gladly send a new prescription to that pharmacy at your request.  controlled substances may not be able to be filled out of state due to license restrictions.  If you have a planned trip, it's best to call your pharmacy at least 5-7 business days to prevent any delays in your medication refill.    Scheduling Tests:    If your physician has ordered radiology tests such as MRI or CT scans, please contact Central Scheduling at [phone] right away to schedule the test.  Once scheduled, the [facility] Centralized Referral Team will work with your insurance carrier to obtain pre-certification or  prior authorization.  Depending on your insurance carrier, approval may take 3-10 days.  It is highly recommended patients assure they have received an authorization before having a test performed.  If test is done without insurance authorization, patient may be responsible for the entire amount billed.      Precertification and Prior Authorizations:  If your physician has recommended that you have a procedure or additional testing performed the [facility] Centralized Referral Team will contact your insurance carrier to obtain pre-certification or prior authorization.    You are strongly encouraged to contact your insurance carrier to verify that your procedure/test has been approved and is a COVERED benefit.  Although the [facility] Centralized Referral Team does its due diligence, the insurance carrier gives the disclaimer that \"Although the procedure is authorized, this does not guarantee payment.\"    Ultimately the patient is responsible for payment.   Thank you for your understanding in this matter.  Paperwork Completion:  If you require FMLA or disability paperwork for your recovery, please make sure to either drop it off or have it faxed to our office at [phone]. Be sure the form has your name and date of birth on it.  The form will be faxed to our Forms Department and they will complete it for you.  There is a 25$ fee for all forms that need to be filled out.  Please be aware there is a 10-14 day turnaround time.  You will need to sign a release of information (ADIEL) form if your paperwork does not come with one.  You may call the Forms Department with any questions at [phone].  Their fax number is [phone].

## (undated) DIAGNOSIS — Z17.0 MALIGNANT NEOPLASM OF UPPER-OUTER QUADRANT OF RIGHT BREAST IN FEMALE, ESTROGEN RECEPTOR POSITIVE (HCC): Primary | ICD-10-CM

## (undated) DIAGNOSIS — C50.411 MALIGNANT NEOPLASM OF UPPER-OUTER QUADRANT OF RIGHT BREAST IN FEMALE, ESTROGEN RECEPTOR POSITIVE (HCC): Primary | ICD-10-CM

## (undated) DEVICE — BIPOLAR FORCEPS CORD,BANANA LEADS: Brand: VALLEYLAB

## (undated) DEVICE — 40580 - THE PINK PAD - ADVANCED TRENDELENBURG POSITIONING KIT: Brand: 40580 - THE PINK PAD - ADVANCED TRENDELENBURG POSITIONING KIT

## (undated) DEVICE — TIP COVER ACCESSORY

## (undated) DEVICE — DECANTER BAG 9": Brand: MEDLINE INDUSTRIES, INC.

## (undated) DEVICE — LAPAROTOMY SPONGE - RF AND X-RAY DETECTABLE PRE-WASHED: Brand: SITUATE

## (undated) DEVICE — DV KIT ACCESSORY 4-ARM

## (undated) DEVICE — STOPCOCK IV 4 WAY BD

## (undated) DEVICE — GYN LAP/ROBOTIC: Brand: MEDLINE INDUSTRIES, INC.

## (undated) DEVICE — BRA SURGICAL ELIZABETH PINK L

## (undated) DEVICE — PLASTIC BREAST CDS-LF: Brand: MEDLINE INDUSTRIES, INC.

## (undated) DEVICE — KENDALL SCD EXPRESS SLEEVES, KNEE LENGTH, MEDIUM: Brand: KENDALL SCD

## (undated) DEVICE — GLOVE BIOGEL M SURG SZ 6-1/2

## (undated) DEVICE — TUBING CYSTO

## (undated) DEVICE — STERILE SYNTHETIC POLYISOPRENE POWDER-FREE SURGICAL GLOVES WITH HYDROGEL COATING, SMOOTH FINISH, STRAIGHT FINGER: Brand: PROTEXIS

## (undated) DEVICE — DV OBTURATOR BLADELESS 8MM

## (undated) DEVICE — MEGADYNE E-Z CLEAN BLADE 2.75"

## (undated) DEVICE — 3M™ IOBAN™ 2 ANTIMICROBIAL INCISE DRAPE 6651EZ: Brand: IOBAN™ 2

## (undated) DEVICE — SUTURE MONOCRYL 4-0 PS-2

## (undated) DEVICE — SCRUB PVP -1 PREP SOLUTION 4OZ

## (undated) DEVICE — VIOLET BRAIDED (POLYGLACTIN 910), SYNTHETIC ABSORBABLE SUTURE: Brand: COATED VICRYL

## (undated) DEVICE — DRAIN ROUND HUBLESS 15FR

## (undated) DEVICE — GYN CDS: Brand: MEDLINE INDUSTRIES, INC.

## (undated) DEVICE — STERILE POLYISOPRENE POWDER-FREE SURGICAL GLOVES: Brand: PROTEXIS

## (undated) DEVICE — LAPAROSCOPIC TROCAR SLEEVE/SINGLE USE: Brand: KII® SLEEVE

## (undated) DEVICE — SUTURE VLOC 180 0 12\" 0316

## (undated) DEVICE — BLADE ELECTROSURG 4IN INSULATE

## (undated) DEVICE — DRAIN RELIAVAC 100CC

## (undated) DEVICE — SUT PDS II 2-0 CT-2 Z333H

## (undated) DEVICE — SOLUTION  .9 1000ML BTL

## (undated) DEVICE — STERILE LATEX POWDER-FREE SURGICAL GLOVES WITH HYDROGEL COATING, SMOOTH FINISH, STRAIGHT FINGER: Brand: PROTEXIS

## (undated) DEVICE — SOL  .9 1000ML BTL

## (undated) DEVICE — PROGRASP FORCEPS: Brand: ENDOWRIST;DAVINCI SI

## (undated) DEVICE — LIGHT HANDLE

## (undated) DEVICE — GLOVE BIOGEL M SURG SZ 71/2

## (undated) DEVICE — UNDYED BRAIDED (POLYGLACTIN 910), SYNTHETIC ABSORBABLE SUTURE: Brand: COATED VICRYL

## (undated) DEVICE — SCD SLEEVE KNEE HI BLEND

## (undated) DEVICE — BREAST-HERNIA-PORT CDS-LF: Brand: MEDLINE INDUSTRIES, INC.

## (undated) DEVICE — MONOPOLAR CURVED SCISSORS: Brand: ENDOWRIST;DAVINCI SI

## (undated) DEVICE — SYRINGE 50ML LL TIP

## (undated) DEVICE — ELECTRO LUBE IS A SINGLE PATIENT USE DEVICE THAT IS INTENDED TO BE USED ON ELECTROSURGICAL ELECTRODES TO REDUCE STICKING.: Brand: KEY SURGICAL ELECTRO LUBE

## (undated) DEVICE — ALCOHOL 70% 4 OZ

## (undated) DEVICE — SUTURE VICRYL 3-0 SH

## (undated) DEVICE — SUTURE PDS II 2-0 CT-2

## (undated) DEVICE — VCARE LARGE, UTERINE MANIPULATOR, VAGINAL-CERVICAL-AHLUWALIA'S-RETRACTOR-ELEVATOR: Brand: VCARE

## (undated) DEVICE — PROVE COVER: Brand: UNBRANDED

## (undated) DEVICE — SOL  .9 3000ML

## (undated) DEVICE — GLOVE SURG TRIUMPH SZ 6-1/2

## (undated) DEVICE — Device

## (undated) DEVICE — 1010 S-DRAPE TOWEL DRAPE 10/BX: Brand: STERI-DRAPE™

## (undated) DEVICE — ADHESIVE MASTISOL 2/3CC VL

## (undated) DEVICE — SUTURE VICRYL 0 UR-6

## (undated) DEVICE — SOL H2O 1000ML BTL

## (undated) DEVICE — DERMABOND LIQUID ADHESIVE

## (undated) DEVICE — CG INFILTRATION TUBING: Brand: CG INFILTRATION TUBING

## (undated) DEVICE — SLEEVE KENDALL SCD EXPRESS MED

## (undated) DEVICE — SUPER SPONGES,MEDIUM: Brand: KERLIX

## (undated) DEVICE — SPECIMEN TRAP LUKI

## (undated) DEVICE — EXOFIN TISSUE ADHESIVE 1.0ML

## (undated) DEVICE — FENESTRATED BIPOLAR FORCEPS: Brand: ENDOWRIST;DAVINCI SI

## (undated) DEVICE — SYRINGE 5ML LL TIP

## (undated) DEVICE — 60 ML SYRINGE,TOOMEY TYPE: Brand: MONOJECT

## (undated) DEVICE — ASPIRATION TUBING SET, DISPOSABLE: Brand: MICROAIRE®

## (undated) DEVICE — PEN SKIN MARKING REG TIP VIOLT

## (undated) DEVICE — SIZER BREAST IMPLANT FP 450CC

## (undated) DEVICE — SUT MONOCRYL 4-0 PS-2 Y496G

## (undated) DEVICE — SOLUTION ANSEP 70% ISOPRPNL

## (undated) DEVICE — SIZER BREAST IMPLANT FP 485CC

## (undated) DEVICE — DEVICE FAT TISSUE COLL REVOLVE

## (undated) DEVICE — DRESSING BIOPATCH 1X4 CNTR

## (undated) DEVICE — ANCHOR TISSUE RETRIEVAL SYSTEM, BAG SIZE 175 ML, PORT SIZE 10 MM: Brand: ANCHOR TISSUE RETRIEVAL SYSTEM

## (undated) DEVICE — 3M(TM) TEGADERM(TM) TRANSPARENT FILM DRESSING FRAME STYLE 9505W: Brand: 3M™ TEGADERM™

## (undated) DEVICE — 3M™ IOBAN™ 2 ANTIMICROBIAL INCISE DRAPE 6648EZ: Brand: IOBAN™ 2

## (undated) DEVICE — BANDAGE ELASTIC ACE 6\" X-LONG

## (undated) DEVICE — USE ITEM #176901

## (undated) DEVICE — EVACUATOR RELIAVAC 100CC

## (undated) DEVICE — MARKER SKIN PREP RESIST STRL

## (undated) DEVICE — TROCAR: Brand: KII FIOS FIRST ENTRY

## (undated) DEVICE — 3M™ STERI-STRIP™ REINFORCED ADHESIVE SKIN CLOSURES, R1548, 1 IN X 5 IN (25 MM X 125 MM), 4 STRIPS/ENVELOPE: Brand: 3M™ STERI-STRIP™

## (undated) DEVICE — NEEDLE SPINAL 20X3-1/2 YELLOW

## (undated) DEVICE — CLOSURE EXOFIN 1.0ML

## (undated) DEVICE — GLOVE ORTHO ALOETOUCH SZ 8-1/2

## (undated) DEVICE — SUT VICRYL 3-0 SH J416H

## (undated) DEVICE — SUTURE ETHILON 3-0 FS-1

## (undated) DEVICE — SOLUTION SURG DURA PREP HAZMAT

## (undated) DEVICE — TRAY SURESTEP 16 BARDEX UMETR

## (undated) DEVICE — 2, DISPOSABLE SUCTION/IRRIGATOR WITHOUT DISPOSABLE TIP: Brand: STRYKEFLOW

## (undated) DEVICE — STERILE (15.2 X 244CM) POLYETHYLENE TELESCOPICALLY-FOLDED COVER WITH ATTACHED (3CM) NEOGUARD™ TIP: Brand: SURGI-TIP TRANSDUCER COVER

## (undated) NOTE — LETTER
09/13/18        Lara Steinberg  [address]      Dear Sonu Bower,    [facility] records indicate that you have outstanding lab work and or testing that was ordered for you and has not yet been completed:  Orders Placed This Encounter

## (undated) NOTE — MR AVS SNAPSHOT
After Visit Summary   6/14/2017    Angelita Gayle    MRN: [id number]           Diagnoses this Visit     Malignant neoplasm of upper-outer quadrant of right breast in female, estrogen receptor positive ([facility]    -  Primary       Allergies     Morphin [address] ([phone])   [phone]            MyChart     Visit MyChart  You can access your MyChart to more actively manage your health care and view more details from this visit by going to https:/

## (undated) NOTE — LETTER
[facility]  [address]  [address] [phone]  [address]: [phone]  FAX: [phone], MD  [facility] 56  [facility]  Via In Basket    The patient listed below is scheduled for surgery and needs the following pre-op labs and/or clearance prior to surgery. The patient has been instructed to schedule an appointment with you for a pre-op physical.      Patient: Isabella Potter  : 1945    Surgeon:  Dr. Ventura Stevens breast reconstruction with placement of right breast tissue expander with acellular dermal matrix    Surgery Date:  3/14/2022  Location:  [facility]    outpatient    [] Hematocrit/Hemogram [x] EKG     [x] CBC    [] Chest X-Ray    [x] CMP    [] PT/PTT    [] Urinalysis   [] MRSA Nasal Culture    [] Urinalysis with reflex  [x] History and Physical    [] Urine pregnancy  [x] Medical Clearance    [] Qualitative HCG (blood) [] Cardiac Clearance      Please fax to [phone] when completed. Call [phone] with any questions. Thank you for your prompt attention to these requirements.     [address] [facility] includes the [facility]

## (undated) NOTE — LETTER
Patient Name: Mehul Coker CSN: [id number]  -Age / Sex: 1945-A: 78 y  female Medical Records: [id number]    ABNORMAL VALUES  Surgeon(s):  Yuliya Miller MD  Anesthesia Type: General  Procedure Description: Removal of right breast tissue expander with placement of permanent implant and left breast augmentation with mastopexy.  Autologous fat grafting to the right breast  Primary Surgeon:  Yuliya Miller MD  Phone Number: [phone]    PLEASE NOTE THE FOLLOWING ABNORMALITIES:   WBC 3.1  ________________________________________________________

## (undated) NOTE — MR AVS SNAPSHOT
[address]   [address], [address] [address] [phone]               Thank you for choosing us for your health care visit with Vikas Cortes MD.  We are glad to serve you and happy to provide you with th Glucose Blood Strp   Test once a day. Use as directed. Commonly known as:  RONALDO CONTOUR NEXT TEST           Losartan Potassium 50 MG Tabs   Take 1 tablet (50 mg total) by mouth daily.    Commonly known as:  COZAAR           Meclizine HCl 12.5 MG Tabs

## (undated) NOTE — MR AVS SNAPSHOT
After Visit Summary   4/25/2017    Suellyn Epley    MRN: [id number]           Visit Information     Date & Time  4/25/2017  2:27 PM Provider  Gil Pearson RN Department  [facility] Mammography Dept.  Phone  [phone]      Allergies a complete it and provide feedback. We strive to deliver the best patient experience and are looking for ways to make improvements. Your feedback will help us do so. For more information on [employer], please visit [email or web address]

## (undated) NOTE — Clinical Note
2017    Patient: Sabiha Steel  : 1945 Visit date: 2017    Dear  Dr. Liseth Turner MD,    Thank you for referring Sabiha Steel to my practice. Please find my assessment and plan below.        Assessment:          The patient is a 71-year-o Plan:     Yaw Gutiérrez is a candidate for breast conservation with lumpectomy with localization and sentinel lymph node biopsy. She would likely prefer this option to mastectomy.   She wishes to discuss surgery with her  and daughter prior to finalizing h

## (undated) NOTE — LETTER
03/05/21        [address] [facility] [address] [phone]      Dear Jen Goodpasture,    [facility] records indicate that you have outstanding lab work and or testing that was ordered for you and has not yet been completed:  Orders Placed This Encoun

## (undated) NOTE — LETTER
September 16, 2019    Estefaniaon Mckenzie  [place] [address] [phone]    Dear Sheila Burnette: It was a pleasure speaking with you over the phone recently.  To follow up, I wanted to send you some contact information to utilize when you have a questi

## (undated) NOTE — LETTER
Patient Name: Angelique Cabral  YOB: 1945          MRN number:  [id number]  Date:  10/1/2019  Referring Physician:  Mary Cohn         UPPER EXTREMITY EVALUATION:    Referring Physician: Dr. Henry Pratt  Diagnosis: Right Rotator Cuff Tendinit Palpation: Increased pain to palpation along the supraspinatus and biceps tendon. AROM/PROM:   Shoulder    Flexion: R 150; L 160  Abduction: R 145; L 150  ER: R 70; L 80  IR: R 30;  L 45       Accessory motion: The patient presents with moderate restri Frequency / Duration: Patient will be seen for 2 x/week or a total of 12 visits over a 90 day period. Treatment will include: Manual Therapy; Therapeutic Exercises; Neuromuscular Re-education; Therapeutic Activity; Electrical Stim; Ultrasound;  Pt education

## (undated) NOTE — MR AVS SNAPSHOT
Leatha Collins [address] [place] [id number] [phone]               Thank you for choosing us for your health care visit with Lillian Elam MD.  We are glad to serve you and happy to provide you with this Losartan Potassium 50 MG Tabs   Take 1 tablet (50 mg total) by mouth daily. Commonly known as:  COZAAR           Meclizine HCl 12.5 MG Tabs   Take 1 tablet (12.5 mg total) by mouth 3 (three) times daily as needed for Dizziness.    Commonly known as:  ANT Tips for making healthy food choices  -   Enjoy your food, but eat less. Fully enjoy your food when eating. Don’t eat while distracted and slow down. Avoid over sized portions. Don’t eat while when you’re bored.      EAT THESE FOODS MORE OFTEN: EAT T

## (undated) NOTE — LETTER
04/05/19        [address] [facility] [address] [phone]      Dear Gio Sarah,    [facility] records indicate that you have outstanding lab work and or testing that was ordered for you and has not yet been completed:  Orders Placed This Encoun

## (undated) NOTE — ED AVS SNAPSHOT
[address] R[address] Sherman   MRN: [id number]    Department:  THE [facility] Emergency Department in [place]   Date of Visit:  2/2/2018           Disclosure     Insurance plans vary and the physician(s) referred by the ER may not be covered by your plan.  Please contact tell this physician (or your personal doctor if your instructions are to return to your personal doctor) about any new or lasting problems. The primary care or specialist physician will see patients referred from the [facility] Emergency Department.  Cheryle Levins

## (undated) NOTE — IP AVS SNAPSHOT
[facility] [place] [address] [place], [address] ~ [phone]                Discharge Summary   5/22/2017    34 [facility]           Admission Information        Provider Department    5/22/2017 Reynold Trejo MD  Marcelino Joe / Norma Schneider Losartan Potassium 50 MG Tabs   Commonly known as:  COZAAR        Take 1 tablet (50 mg total) by mouth daily.     Sahara Eugene     [    ]    [    ]    [    ]    [    ]       Meclizine HCl 12.5 MG Tabs   Commonly known as:  ANTIVERT        Take 1 table If this appears excessive please contact the office  [phone]    3. The pathology or biopsy results take 2-3 days to process. Please call the office 2 days after surgery for the results and further instructions will be given.  Also at that time make an · Sometimes after surgery; you don't feel like eating normally  · If you don't drink water; you could get dehydrated    Alcoholic beverages should be avoided for:  · 24 hours after Anesthesia/Sedation    Call the doctor for:  · Elevated Temperature  · Blee - If you have concerns related to behavioral health issues or thoughts of harming yourself, contact [address] [place] [address] at [phone].     - If you don’t have insurance, George Christine has partnered with [employer] [place]

## (undated) NOTE — Clinical Note
05/24/2017        Harvey Packer  [address] [facility]      Dear Bethany Oshea,    [phone] Located within [facility] records indicate that you have outstanding lab work and or testing that was ordered for you and has not yet been completed:      Fecal Occult Blood, Immunoa

## (undated) NOTE — MR AVS SNAPSHOT
Ijeoma Collins [address] [place] [id number] [phone]               Thank you for choosing us for your health care visit with Bhavin Alvarez MD.  We are glad to serve you and happy to provide you with this Hypertension, essential    LFT elevation    Mixed hyperlipidemia    Encounter for annual health examination    -  Primary    Visit for screening mammogram        Postmenopausal        Screening for colon cancer        Need for vaccination        Type 2 di 02/17/2017 77     LDL CHOLESTROL (mg/dL)   Date Value   03/25/2014 69     CHOLESTEROL, TOTAL (mg/dL)   Date Value   02/17/2017 180     CHOLESTEROL (mg/dL)   Date Value   03/25/2014 137     TRIGLYCERIDES (mg/dL)   Date Value   02/17/2017 148   03/25/2014 95 Biennial benefit unless patient has history of long-term glucocorticoid use for medical condition    No results found for this or any previous visit. No flowsheet data found.     Recommended for 2 year anniversary or as ordered in After Visit Summary    Micaela Dodson Zoster (Not covered by Medicare Part B) No orders found for this or any previous visit. This may be covered with your pharmacy  prescription benefits     Recommended Websites for Advanced Directives    [email or web address] ? If you have pets, be careful that you don’t trip over them. OUTSIDE SAFETY TIPS  ? Always wear good shoes with proper support and traction. ? Always use hand rails on stairs and escalators. ? Cover porch steps with gritty weather proof paint.   ? Pay These medications were sent to [facility] [address] [address] [place]  [facility] [address], [phone], [address] [facility]     Phone:  [phone]    - Pravastatin Sodium 40 MG Tabs ? Keep furniture in its accustomed place. ? If you have pets, be careful that you don’t trip over them. OUTSIDE SAFETY TIPS  ? Always wear good shoes with proper support and traction. ? Always use hand rails on stairs and escalators.   ? Cover porch s